# Patient Record
Sex: FEMALE | Race: WHITE | NOT HISPANIC OR LATINO | Employment: FULL TIME | URBAN - METROPOLITAN AREA
[De-identification: names, ages, dates, MRNs, and addresses within clinical notes are randomized per-mention and may not be internally consistent; named-entity substitution may affect disease eponyms.]

---

## 2017-01-28 ENCOUNTER — HOSPITAL ENCOUNTER (EMERGENCY)
Facility: MEDICAL CENTER | Age: 27
End: 2017-01-28
Attending: EMERGENCY MEDICINE
Payer: COMMERCIAL

## 2017-01-28 ENCOUNTER — APPOINTMENT (OUTPATIENT)
Dept: RADIOLOGY | Facility: MEDICAL CENTER | Age: 27
End: 2017-01-28
Attending: EMERGENCY MEDICINE
Payer: COMMERCIAL

## 2017-01-28 VITALS
HEIGHT: 58 IN | SYSTOLIC BLOOD PRESSURE: 112 MMHG | BODY MASS INDEX: 29.99 KG/M2 | WEIGHT: 142.86 LBS | TEMPERATURE: 97.4 F | OXYGEN SATURATION: 95 % | RESPIRATION RATE: 18 BRPM | DIASTOLIC BLOOD PRESSURE: 60 MMHG | HEART RATE: 70 BPM

## 2017-01-28 DIAGNOSIS — M25.551 ACUTE HIP PAIN, RIGHT: ICD-10-CM

## 2017-01-28 DIAGNOSIS — R45.2 UNHAPPINESS: ICD-10-CM

## 2017-01-28 LAB
ALBUMIN SERPL BCP-MCNC: 4.3 G/DL (ref 3.2–4.9)
ALBUMIN/GLOB SERPL: 2.2 G/DL
ALP SERPL-CCNC: 49 U/L (ref 30–99)
ALT SERPL-CCNC: 27 U/L (ref 2–50)
ANION GAP SERPL CALC-SCNC: 4 MMOL/L (ref 0–11.9)
AST SERPL-CCNC: 19 U/L (ref 12–45)
BASOPHILS # BLD AUTO: 0.5 % (ref 0–1.8)
BASOPHILS # BLD: 0.04 K/UL (ref 0–0.12)
BILIRUB SERPL-MCNC: 0.4 MG/DL (ref 0.1–1.5)
BUN SERPL-MCNC: 11 MG/DL (ref 8–22)
CALCIUM SERPL-MCNC: 8.6 MG/DL (ref 8.5–10.5)
CHLORIDE SERPL-SCNC: 108 MMOL/L (ref 96–112)
CO2 SERPL-SCNC: 24 MMOL/L (ref 20–33)
CREAT SERPL-MCNC: 0.66 MG/DL (ref 0.5–1.4)
CRP SERPL HS-MCNC: 0.3 MG/L (ref 0–7.5)
EOSINOPHIL # BLD AUTO: 0.25 K/UL (ref 0–0.51)
EOSINOPHIL NFR BLD: 3.2 % (ref 0–6.9)
ERYTHROCYTE [DISTWIDTH] IN BLOOD BY AUTOMATED COUNT: 39.7 FL (ref 35.9–50)
ERYTHROCYTE [SEDIMENTATION RATE] IN BLOOD BY WESTERGREN METHOD: 6 MM/HOUR (ref 0–20)
GFR SERPL CREATININE-BSD FRML MDRD: >60 ML/MIN/1.73 M 2
GLOBULIN SER CALC-MCNC: 2 G/DL (ref 1.9–3.5)
GLUCOSE SERPL-MCNC: 92 MG/DL (ref 65–99)
HCG SERPL QL: NEGATIVE
HCT VFR BLD AUTO: 43.1 % (ref 37–47)
HGB BLD-MCNC: 14.7 G/DL (ref 12–16)
IMM GRANULOCYTES # BLD AUTO: 0.02 K/UL (ref 0–0.11)
IMM GRANULOCYTES NFR BLD AUTO: 0.3 % (ref 0–0.9)
LYMPHOCYTES # BLD AUTO: 2.17 K/UL (ref 1–4.8)
LYMPHOCYTES NFR BLD: 28.1 % (ref 22–41)
MCH RBC QN AUTO: 30.8 PG (ref 27–33)
MCHC RBC AUTO-ENTMCNC: 34.1 G/DL (ref 33.6–35)
MCV RBC AUTO: 90.4 FL (ref 81.4–97.8)
MONOCYTES # BLD AUTO: 0.46 K/UL (ref 0–0.85)
MONOCYTES NFR BLD AUTO: 6 % (ref 0–13.4)
NEUTROPHILS # BLD AUTO: 4.79 K/UL (ref 2–7.15)
NEUTROPHILS NFR BLD: 61.9 % (ref 44–72)
NRBC # BLD AUTO: 0 K/UL
NRBC BLD AUTO-RTO: 0 /100 WBC
PLATELET # BLD AUTO: 185 K/UL (ref 164–446)
PMV BLD AUTO: 9.7 FL (ref 9–12.9)
POTASSIUM SERPL-SCNC: 3.9 MMOL/L (ref 3.6–5.5)
PROT SERPL-MCNC: 6.3 G/DL (ref 6–8.2)
RBC # BLD AUTO: 4.77 M/UL (ref 4.2–5.4)
SODIUM SERPL-SCNC: 136 MMOL/L (ref 135–145)
WBC # BLD AUTO: 7.7 K/UL (ref 4.8–10.8)

## 2017-01-28 PROCEDURE — 700102 HCHG RX REV CODE 250 W/ 637 OVERRIDE(OP): Performed by: EMERGENCY MEDICINE

## 2017-01-28 PROCEDURE — A9270 NON-COVERED ITEM OR SERVICE: HCPCS | Performed by: EMERGENCY MEDICINE

## 2017-01-28 PROCEDURE — 99284 EMERGENCY DEPT VISIT MOD MDM: CPT

## 2017-01-28 PROCEDURE — 73700 CT LOWER EXTREMITY W/O DYE: CPT | Mod: RT

## 2017-01-28 PROCEDURE — 80053 COMPREHEN METABOLIC PANEL: CPT

## 2017-01-28 PROCEDURE — 85652 RBC SED RATE AUTOMATED: CPT

## 2017-01-28 PROCEDURE — 86141 C-REACTIVE PROTEIN HS: CPT

## 2017-01-28 PROCEDURE — 85025 COMPLETE CBC W/AUTO DIFF WBC: CPT

## 2017-01-28 PROCEDURE — 84703 CHORIONIC GONADOTROPIN ASSAY: CPT

## 2017-01-28 RX ORDER — HYDROCODONE BITARTRATE AND ACETAMINOPHEN 10; 325 MG/1; MG/1
1 TABLET ORAL ONCE
Status: COMPLETED | OUTPATIENT
Start: 2017-01-28 | End: 2017-01-28

## 2017-01-28 RX ORDER — HYDROCODONE BITARTRATE AND ACETAMINOPHEN 5; 325 MG/1; MG/1
1-2 TABLET ORAL EVERY 4 HOURS PRN
Qty: 14 TAB | Refills: 0 | Status: SHIPPED | OUTPATIENT
Start: 2017-01-28 | End: 2018-05-30

## 2017-01-28 RX ADMIN — HYDROCODONE BITARTRATE AND ACETAMINOPHEN 1 TABLET: 10; 325 TABLET ORAL at 20:09

## 2017-01-28 ASSESSMENT — PAIN SCALES - GENERAL: PAINLEVEL_OUTOF10: 8

## 2017-01-28 NOTE — ED AVS SNAPSHOT
Motilo Access Code: [email or web address]  Expires: 2/26/2017 10:33 AM    Motilo  A secure, online tool to manage your health information     [facility]’s Motilo® is a secure, online tool that connects you to your personalized health information from the privacy of your home -- day or night - making it very easy for you to manage your healthcare. Once the activation process is completed, you can even access your medical information using the Motilo niles, which is available for free in the Apple Niles store or Google Play store.     Motilo provides the following levels of access (as shown below):   My Chart Features   [facility] Primary Care Doctor [facility]  Specialists [facility]  Urgent  Care Non-[facility]  Primary Care  Doctor   Email your healthcare team securely and privately 24/7 X X X X   Manage appointments: schedule your next appointment; view details of past/upcoming appointments X      Request prescription refills. X      View recent personal medical records, including lab and immunizations X X X X   View health record, including health history, allergies, medications X X X X   Read reports about your outpatient visits, procedures, consult and ER notes X X X X   See your discharge summary, which is a recap of your hospital and/or ER visit that includes your diagnosis, lab results, and care plan. X X       How to register for Motilo:  1. Go to  [email or web address].  2. Click on the Sign Up Now box, which takes you to the New Member Sign Up page. You will need to provide the following information:  a. Enter your Motilo Access Code exactly as it appears at the top of this page. (You will not need to use this code after you’ve completed the sign-up process. If you do not sign up before the expiration date, you must request a new code.)   b. Enter your date of birth.   c. Enter your home email address.   d. Click Submit, and follow the next screen’s instructions.  3. Create a Motilo ID. This will be your Motilo  login ID and cannot be changed, so think of one that is secure and easy to remember.  4. Create a [employer] password. You can change your password at any time.  5. Enter your Password Reset Question and Answer. This can be used at a later time if you forget your password.   6. Enter your e-mail address. This allows you to receive e-mail notifications when new information is available in [employer]ix.  7. Click Sign Up. You can now view your health information.    For assistance activating your [employer] account, call [phone]

## 2017-01-28 NOTE — ED AVS SNAPSHOT
After Visit Summary                                                                                                                Veronica Kirby   MRN: [id number]    Department:  [facility], Emergency Dept   Date of Visit:  1/28/2017            [facility], Emergency Dept    [address]    [place] [address]    Phone:  [phone]      You were seen by     Clayton Negron M.D.      Your Diagnosis Was     Acute hip pain, right     M25.551       These are the medications you received during your hospitalization from 01/28/2017 1803 to 01/28/2017 2245     Date/Time Order Dose Route Action    01/28/2017 2009 hydrocodone/acetaminophen (NORCO)  MG per tablet 1 Tab 1 Tab Oral Given      Follow-up Information     1. Follow up with [facility].    Contact information    [address] [place] [address] [phone]      Medication Information     Review all of your home medications and newly ordered medications with your primary doctor and/or pharmacist as soon as possible. Follow medication instructions as directed by your doctor and/or pharmacist.     Please keep your complete medication list with you and share with your physician. Update the information when medications are discontinued, doses are changed, or new medications (including over-the-counter products) are added; and carry medication information at all times in the event of emergency situations.               Medication List      START taking these medications        Instructions    hydrocodone-acetaminophen 5-325 MG Tabs per tablet   Commonly known as:  NORCO    Take 1-2 Tabs by mouth every four hours as needed.   Dose:  1-2 Tab         ASK your doctor about these medications        Instructions    IRON PO    Take 2 Tabs by mouth every day.   Dose:  2 Tab       naproxen 500 MG Tabs   Commonly known as:  NAPROSYN    Take 500 mg by mouth as needed. Indications: Mild to Moderate Pain   Dose:  500 mg       therapeutic multivitamin-minerals Tabs    Take 1 Tab by mouth every day.   Dose:  1 Tab               Procedures and tests performed during your visit     BETA-HCG QUALITATIVE SERUM    CBC WITH DIFFERENTIAL    CMP    CRP HIGH SENSITIVE (CARDIAC)    CT-HIP W/O PLUS RECONS RIGHT    ESTIMATED GFR    WESTERGREN SED RATE        Discharge Instructions       Contusion  A contusion is a deep bruise. Contusions are the result of an injury that caused bleeding under the skin. The contusion may turn blue, purple, or yellow. Minor injuries will give you a painless contusion, but more severe contusions may stay painful and swollen for a few weeks.   CAUSES   A contusion is usually caused by a blow, trauma, or direct force to an area of the body.  SYMPTOMS   · Swelling and redness of the injured area.  · Bruising of the injured area.  · Tenderness and soreness of the injured area.  · Pain.  DIAGNOSIS   The diagnosis can be made by taking a history and physical exam. An X-ray, CT scan, or MRI may be needed to determine if there were any associated injuries, such as fractures.  TREATMENT   Specific treatment will depend on what area of the body was injured. In general, the best treatment for a contusion is resting, icing, elevating, and applying cold compresses to the injured area. Over-the-counter medicines may also be recommended for pain control. Ask your caregiver what the best treatment is for your contusion.  HOME CARE INSTRUCTIONS   · Put ice on the injured area.  ¨ Put ice in a plastic bag.  ¨ Place a towel between your skin and the bag.  ¨ Leave the ice on for 15-20 minutes, 3-4 times a day, or as directed by your health care provider.  · Only take over-the-counter or prescription medicines for pain, discomfort, or fever as directed by your caregiver. Your caregiver may recommend avoiding anti-inflammatory medicines (aspirin, ibuprofen, and naproxen) for 48 hours because these medicines may increase bruising.  · Rest  the injured area.  · If possible, elevate the injured area to reduce swelling.  SEEK IMMEDIATE MEDICAL CARE IF:   · You have increased bruising or swelling.  · You have pain that is getting worse.  · Your swelling or pain is not relieved with medicines.  MAKE SURE YOU:   · Understand these instructions.  · Will watch your condition.  · Will get help right away if you are not doing well or get worse.     This information is not intended to replace advice given to you by your health care provider. Make sure you discuss any questions you have with your health care provider.     Document Released: 09/27/2006 Document Revised: 12/23/2014 Document Reviewed: 10/22/2012  [employer]now [employer] Interactive Patient Education ©2016 Elsevier [employer].            Patient Information     Patient Information    Following emergency treatment: all patient requiring follow-up care must return either to a private physician or a clinic if your condition worsens before you are able to obtain further medical attention, please return to the emergency room.     Billing Information    At [facility], we work to make the billing process streamlined for our patients.  Our Representatives are here to answer any questions you may have regarding your hospital bill.  If you have insurance coverage and have supplied your insurance information to us, we will submit a claim to your insurer on your behalf.  Should you have any questions regarding your bill, we can be reached online or by phone as follows:  Online: You are able pay your bills online or live chat with our representatives about any billing questions you may have. We are here to help Monday - Friday from 8:00am to 7:30pm and 9:00am - 12:00pm on Saturdays.  Please visit [email or web address]  for more information.   Phone:  [phone] or [phone]    Please note that your emergency physician, surgeon, pathologist, radiologist, anesthesiologist, and other specialists  are not employed by [facility] and will therefore bill separately for their services.  Please contact them directly for any questions concerning their bills at the numbers below:     Emergency Physician Services:  [phone]  [facility]:  [phone]  [facility]:  [phone]  [facility]:  [phone]    1. Your final bill may vary from the amount quoted upon discharge if all procedures are not complete at that time, or if your doctor has additional procedures of which we are not aware. You will receive an additional bill if you return to the Emergency Department at [facility] for suture removal regardless of the facility of which the sutures were placed.     2. Please arrange for settlement of this account at the emergency registration.    3. All self-pay accounts are due in full at the time of treatment.  If you are unable to meet this obligation then payment is expected within 4-5 days.     4. If you have had radiology studies (CT, X-ray, Ultrasound, MRI), you have received a preliminary result during your emergency department visit. Please contact the radiology department [phone] to receive a copy of your final result. Please discuss the Final result with your primary physician or with the follow up physician provided.     Crisis Hotline:  [place] Crisis Hotline:  2-153-VRVSNIQ or 1-654.769.8439  [place] Crisis Hotline:    1-115.103.2174 or 984-917-7177         ED Discharge Follow Up Questions    1. In order to provide you with very good care, we would like to follow up with a phone call in the next few days.  May we have your permission to contact you?     YES /  NO    2. What is the best phone number to call you? (       )_____-__________    3. What is the best time to call you?      Morning  /  Afternoon  /  Evening                   Patient Signature:  ____________________________________________________________    Date:   ____________________________________________________________

## 2017-01-28 NOTE — ED AVS SNAPSHOT
1/28/2017          Veronica Kirby  [address]  [address]  [place] [address]    Dear Veronica:    [facility] wants to ensure your discharge home is safe and you or your loved ones have had all your questions answered regarding your care after you leave the hospital.    You may receive a telephone call within two days of your discharge.  This call is to make certain you understand your discharge instructions as well as ensure we provided you with the best care possible during your stay with us.     The call will only last approximately 3-5 minutes and will be done by a nurse.    Once again, we want to ensure your discharge home is safe and that you have a clear understanding of any next steps in your care.  If you have any questions or concerns, please do not hesitate to contact us, we are here for you.  Thank you for choosing [facility] for your healthcare needs.    Sincerely,    Clayton Castillo    [facility]

## 2017-01-29 NOTE — ED NOTES
Pt c/o R hip pain s/p slipping on ice several times on 3 different occasions. Pt was able to ambulate from lobby to room, gait steady. A/o x4, speaking in full sentences.

## 2017-01-29 NOTE — ED PROVIDER NOTES
"ED Provider Note    Scribed for Clayton Negron M.D. by Angelica aWde. 1/28/2017, 7:51 PM.    Primary care provider: Pcp Pt States None  Means of arrival: Walk-In  History obtained from: Patient  History limited by: None    CHIEF COMPLAINT  Chief Complaint   Patient presents with   • Hip Injury     right hip.    • T-5000 GLF     fell on ice 3 seperate occasions      HPI  Veronica Kirby is a 27 y.o. female who presents to the Emergency Department with persistent worsening non-radiating right-sided hip pain without associated symptoms onset four days ago.  Though the patient denies trauma to her hip, she \"slipped on the ice\" three times within the last week.  Per patient, she \"caught herself\" each time and did not fall or land on her hip.  Four days ago the patient woke up to moderate pain that quickly increased in severity.  The patient has since suffered from constant pain, exacerbated with movement.  She reports finding no relief from over the counter pain medications such as Advil and Aleve.  The patient has also seen little to no relief from ice and heat application.  The patient denies her pain radiating to her lower back.  She does not have associated numbness, tingling, or weakness.  The patient denies suffering from hip pain in the past but notes recurrent knee pain and swelling, for which she does not meet with a specialist.  The patient denies history of gout.  She denies chance of pregnancy.  The patient smokes but denies recreational drug use.  She denies chance of pregnancy.  The patient denies additional symptoms or further pertinent medical history.     REVIEW OF SYSTEMS  See HPI,  Negative for lower back pain, numbness, tingling, weakness   Remainder of ROS negative.     PAST MEDICAL HISTORY   The patient denies pertinent past medical history.     SURGICAL HISTORY  patient denies any surgical history    SOCIAL HISTORY  Social History   Substance Use Topics   • Smoking status: Current Every Day Smoker " "-- 0.50 packs/day   • Smokeless tobacco: None   • Alcohol Use: Yes      Comment: social      History   Drug Use No     FAMILY HISTORY  History reviewed. No pertinent family history.    CURRENT MEDICATIONS  Reviewed.  See Encounter Summary.     ALLERGIES  Allergies   Allergen Reactions   • Pcn [Penicillins] Anaphylaxis   • Sulfa Drugs Anaphylaxis   • Codeine Unspecified     Pt states \"I was a childhood allergie\".     PHYSICAL EXAM  VITAL SIGNS: /68 mmHg  Pulse 102  Temp(Src) 36 °C (96.8 °F)  Resp 18  Ht 1.473 m (4' 10\")  Wt 64.8 kg (142 lb 13.7 oz)  BMI 29.87 kg/m2  SpO2 100%  Constitutional: Awake, alert in no apparent distress.  HENT: Normocephalic, Bilateral external ears normal. Nose normal.   Eyes: Conjunctiva normal, non-icteric, EOMI.    Thorax & Lungs: Easy unlabored respirations, clear to auscultation bilaterally.  Cardiovascular:  Mild tachycardia, brisk capillary refill  Abdomen: Nondistended , soft, nontender  Musculoskeletal:  Tenderness over the lateral rght hip decreased ROm secondary to pain, however the patient is able to range approximately 30° in every direction. There is no obvious sign of trauma over the hip.  Skin: Visualized skin is  Dry, No erythema, No rash.   Extremities:   No cyanosis, clubbing or edema  Neurologic: Alert, Grossly non-focal.   Psychiatric: Rather hostile affect    DIAGNOSTIC STUDIES / PROCEDURES    RADIOLOGY  CT-HIP W/O PLUS RECONS RIGHT   Final Result      1.  No RIGHT hip or pelvic fracture.   2.  Minimal degenerative change of both hips.   3.  Small amount of pelvic fluid, nonspecific.        The radiologist's interpretation of all radiological studies have been reviewed by me.    COURSE & MEDICAL DECISION MAKING  Nursing notes and vital signs were reviewed. Pertinent Labs & Imaging studies reviewed. (See chart for details)    7:51 PM - Patient seen and examined at bedside. Patient will be treated with 5 mg of Norco via tablet. Ordered CT-Hip, CBC, CMP, " Westergren SED Rate, CRP High Sensitive, and Beta-HCG Qualitative Serum to evaluate her symptoms.     10:16 PM- At this time the patient's lab results are available.  The only result still pending is her CT-Hip.  The patient will be rechecked once radiology results are complete, if not sooner.     10:34 PM- The patient's CT-Hip results are now available, see above.  She does not present with a hip or pelvic fracture but does have degenerative changes bilaterally.  Patient and her family will be updated shortly.     10:44 PM - Re-examined; The patient is resting with spouse at bedside. I discussed her above findings and plans for discharge with a prescription for Norco. Patient understands but voices some frustration with the inability to obtain a more significant diagnosis.  The patient was informed that it is likely she incurred a bone bruise that will resolve in time.  The patient was encouraged to continue applying heat and ice.  Rest was also suggested.  She will follow up with her primary care physician and was instructed to return to the ED if her symptoms worsen. The patient will receive further information  to take home.  All questions and concerns were addressed.  Patient understands and agrees.    I reviewed prescription monitoring program for patient's narcotic use before prescribing a scheduled drug.The patient will not drink alcohol nor drive with prescribed medications. The patient was informed of their systolic blood pressure exceeding 120 and I have asked them to follow up with their primary care physician to discuss further monitoring and possible treatment.     Decision Making:  This is a 27 y.o. year old female who presents with reportedly severe right hip pain aftersignificant trauma. She denies falling on the right hip. She states that she is unable to bear weight secondary to pain. For this reason a full workup was conducted to include a CT and basic blood work. There is no evidence of a septic  joint either by laboratory evaluation or clinical examination. CT is also unremarkable, there is no occult hip fracture, there is no significant joint effusion. I suspect this is a hip contusion or arthritis.  I recommend she follow-up with primary care physician for additional management of her hip pain. She is discharged with crutches and a small amount of pain medications.I have looked the patient up in the prescription monitoring database prior to prescribing the scheduled medication. I have also counseled the patient not to drive or operate heavy machinery while taking any medication that may cause sedation.  The patient's blood pressure is elevated today. >120/80. I have referred them to primary care for follow up.       DISPOSITION:  Patient will be discharged home in good condition.    Discharge Medications:  New Prescriptions    HYDROCODONE-ACETAMINOPHEN (NORCO) 5-325 MG TAB PER TABLET    Take 1-2 Tabs by mouth every four hours as needed.     The patient was discharged home (see d/c instructions) and told to return immediately for any signs or symptoms listed, or any worsening at all.  The patient verbally agreed to the discharge precautions and follow-up plan which is documented in EPIC.    FINAL IMPRESSION  1. Acute hip pain, right        Angelica ALTAMIRANO (Scribe), am scribing for, and in the presence of, Clayton Negron M.D..    Electronically signed by: Angelica Wade ([employer]), 1/28/2017    Clayton ALTAMIRANO M.D. personally performed the services described in this documentation, as scribed by Angelica Wade in my presence, and it is both accurate and complete.    The note accurately reflects work and decisions made by me.  Clayton Negron  1/29/2017  12:57 AM

## 2017-01-29 NOTE — ED NOTES
.  Chief Complaint   Patient presents with   • Hip Injury     right hip.    • T-5000 GLF     fell on ice 3 seperate occasions      Ambulated to triage with above c/c. Pt has tried ice, heat and tens unit without relief.

## 2017-03-21 ENCOUNTER — OCCUPATIONAL MEDICINE (OUTPATIENT)
Dept: URGENT CARE | Facility: PHYSICIAN GROUP | Age: 27
End: 2017-03-21
Payer: MEDICARE

## 2017-03-21 ENCOUNTER — APPOINTMENT (OUTPATIENT)
Dept: RADIOLOGY | Facility: IMAGING CENTER | Age: 27
End: 2017-03-21
Attending: NURSE PRACTITIONER
Payer: MEDICARE

## 2017-03-21 VITALS
HEART RATE: 82 BPM | DIASTOLIC BLOOD PRESSURE: 82 MMHG | TEMPERATURE: 98.4 F | BODY MASS INDEX: 28.97 KG/M2 | WEIGHT: 138 LBS | RESPIRATION RATE: 14 BRPM | OXYGEN SATURATION: 99 % | HEIGHT: 58 IN | SYSTOLIC BLOOD PRESSURE: 106 MMHG

## 2017-03-21 DIAGNOSIS — S89.91XA RIGHT KNEE INJURY, INITIAL ENCOUNTER: ICD-10-CM

## 2017-03-21 DIAGNOSIS — S80.01XA CONTUSION OF RIGHT KNEE, INITIAL ENCOUNTER: ICD-10-CM

## 2017-03-21 DIAGNOSIS — Z02.1 PRE-EMPLOYMENT DRUG SCREENING: ICD-10-CM
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
## 2017-03-21 LAB
AMP AMPHETAMINE: NORMAL
BREATH ALCOHOL COMMENT: NORMAL
COC COCAINE: NORMAL
INT CON NEG: NEGATIVE
INT CON POS: POSITIVE
MET METHAMPHETAMINES: NORMAL
OPI OPIATES: NORMAL
PCP PHENCYCLIDINE: NORMAL
POC BREATHALIZER: 0 PERCENT (ref 0–0.01)
POC DRUG COMMENT 753798-OCCUPATIONAL HEALTH: NORMAL
THC: NORMAL

## 2017-03-21 PROCEDURE — 82075 ASSAY OF BREATH ETHANOL: CPT | Mod: 29 | Performed by: NURSE PRACTITIONER

## 2017-03-21 PROCEDURE — 99204 OFFICE O/P NEW MOD 45 MIN: CPT | Mod: 29 | Performed by: NURSE PRACTITIONER

## 2017-03-21 PROCEDURE — L1830 KO IMMOB CANVAS LONG PRE OTS: HCPCS | Mod: 29 | Performed by: NURSE PRACTITIONER

## 2017-03-21 PROCEDURE — 73562 X-RAY EXAM OF KNEE 3: CPT | Mod: 26,RT,29 | Performed by: NURSE PRACTITIONER

## 2017-03-21 PROCEDURE — 80300 POCT 6 PANEL URINE DRUG SCREEN - INSTANT: CPT | Mod: 29 | Performed by: NURSE PRACTITIONER

## 2017-03-21 RX ORDER — KETOROLAC TROMETHAMINE 30 MG/ML
60 INJECTION, SOLUTION INTRAMUSCULAR; INTRAVENOUS ONCE
Status: COMPLETED | OUTPATIENT
Start: 2017-03-21 | End: 2017-03-21

## 2017-03-21 RX ADMIN — KETOROLAC TROMETHAMINE 60 MG: 30 INJECTION, SOLUTION INTRAMUSCULAR; INTRAVENOUS at 11:18

## 2017-03-21 ASSESSMENT — ENCOUNTER SYMPTOMS
NUMBNESS: 0
JOINT SWELLING: 1
ARTHRALGIAS: 1
FALLS: 1
BACK PAIN: 0
NECK PAIN: 0

## 2017-03-21 NOTE — MR AVS SNAPSHOT
"        Veronica Kirby   3/21/2017 10:10 AM   Occupational Medicine   MRN: [id number]    Department:  [facility]   Dept Phone:  [phone]    Description:  Female : 1990   Provider:  LEONARD Baer           Reason for Visit     Knee Injury R. knee Pt. 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       Allergies as of 3/21/2017     Allergen Noted Reactions    Pcn [Penicillins] 2011   Anaphylaxis    Sulfa Drugs 2008   Anaphylaxis    Codeine 2008   Unspecified    Pt states \"I was a childhood allergie\".      You were diagnosed with     Right knee injury, initial encounter   [334502]       Contusion of right knee, initial encounter   [120079]       Work related injury   [898153]       Pre-employment drug screening   [597633]         Vital Signs     Blood Pressure Pulse Temperature Respirations Height Weight    106/82 mmHg 82 36.9 °C (98.4 °F) 14 1.473 m (4' 9.99\") 62.596 kg (138 lb)    Body Mass Index Oxygen Saturation Smoking Status             28.85 kg/m2 99% Current Every Day Smoker         Basic Information     Date Of Birth Sex Race Ethnicity Preferred Language    1990 Female White Non- English      Your appointments     Mar 24, 2017  1:00 PM   Workers Compensation with [facility])    [address] [place] [address]. [address]  [facility] [address]   [phone]              Health Maintenance     Patient has no pending health maintenance at this time      Results     POCT Breath Alcohol Test      Component Value Standard Range & Units    POC Breathalizer 0.000 0.00 - 0.01 Percent    Breath Alcohol Comment                  POCT 6 Panel Urine Drug Screen      Component    AMPHETAMINE    Neg    POC THC    Inconclusive    Comment:     IN clinic testing resulted inconclusive for THC    COCAINE    Neg    OPIATES    Neg    PHENCYCLIDINE    Neg    METHAMPHETAMINES    Neg    POC Urine Drug Screen " Comment    N/A    Internal Control Positive    Positive    Internal Control Negative    Negative                        Current Immunizations     Tdap Vaccine 12/21/2016  8:42 PM    Tuberculin Skin Test 5/6/2014 11:47 AM, 4/29/2014 11:30 AM      Below and/or attached are the medications your provider expects you to take. Review all of your home medications and newly ordered medications with your provider and/or pharmacist. Follow medication instructions as directed by your provider and/or pharmacist. Please keep your medication list with you and share with your provider. Update the information when medications are discontinued, doses are changed, or new medications (including over-the-counter products) are added; and carry medication information at all times in the event of emergency situations     Allergies:  PCN - Anaphylaxis     SULFA DRUGS - Anaphylaxis     CODEINE - Unspecified               Medications  Valid as of: March 21, 2017 - 11:32 AM    Generic Name Brand Name Tablet Size Instructions for use    Hydrocodone-Acetaminophen (Tab) NORCO 5-325 MG Take 1-2 Tabs by mouth every four hours as needed.        Iron   Take 2 Tabs by mouth every day.        Melatonin   Take  by mouth.        Multiple Vitamins-Minerals (Tab) THERAGRAN-M  Take 1 Tab by mouth every day.        Naproxen (Tab) NAPROSYN 500 MG Take 500 mg by mouth as needed. Indications: Mild to Moderate Pain        .                 Medicines prescribed today were sent to:     [id number] - [place] - [address] [place] [address] AT [facility] [address] [address]    Phone: [phone] Fax: [phone]    Open 24 Hours?: No      Medication refill instructions:       If your prescription bottle indicates you have medication refills left, it is not necessary to call your provider’s office. Please contact your pharmacy and they will refill your medication.    If your prescription bottle indicates you do not have any refills left, you  may request refills at any time through one of the following ways: The online Higher One system (except Urgent Care), by calling your provider’s office, or by asking your pharmacy to contact your provider’s office with a refill request. Medication refills are processed only during regular business hours and may not be available until the next business day. Your provider may request additional information or to have a follow-up visit with you prior to refilling your medication.   *Please Note: Medication refills are assigned a new Rx number when refilled electronically. Your pharmacy may indicate that no refills were authorized even though a new prescription for the same medication is available at the pharmacy. Please request the medicine by name with the pharmacy before contacting your provider for a refill.        Your To Do List     Future Labs/Procedures Complete By Expires    DX-KNEE 3 VIEWS RIGHT  As directed 3/21/2018         Higher One Access Code: [email or web address]  Expires: 3/27/2017 11:13 AM    Higher One  A secure, online tool to manage your health information     [employer]’s Higher One® is a secure, online tool that connects you to your personalized health information from the privacy of your home -- day or night - making it very easy for you to manage your healthcare. Once the activation process is completed, you can even access your medical information using the Higher One niles, which is available for free in the Apple Niles store or Google Play store.     Higher One provides the following levels of access (as shown below):   My Chart Features   [facility] Doctor [facility]  Specialists [facility]  Urgent  Care Non-[facility]  Primary Care  Doctor   Email your healthcare team securely and privately 24/7 X X X    Manage appointments: schedule your next appointment; view details of past/upcoming appointments X      Request prescription refills. X      View recent personal medical records, including lab and immunizations X X  X X   View health record, including health history, allergies, medications X X X X   Read reports about your outpatient visits, procedures, consult and ER notes X X X X   See your discharge summary, which is a recap of your hospital and/or ER visit that includes your diagnosis, lab results, and care plan. X X       How to register for [employer]:  1. Go to  [email or web address].  2. Click on the Sign Up Now box, which takes you to the New Member Sign Up page. You will need to provide the following information:  a. Enter your [employer] Access Code exactly as it appears at the top of this page. (You will not need to use this code after you’ve completed the sign-up process. If you do not sign up before the expiration date, you must request a new code.)   b. Enter your date of birth.   c. Enter your home email address.   d. Click Submit, and follow the next screen’s instructions.  3. Create a [employer] ID. This will be your [employer] login ID and cannot be changed, so think of one that is secure and easy to remember.  4. Create a [employer] password. You can change your password at any time.  5. Enter your Password Reset Question and Answer. This can be used at a later time if you forget your password.   6. Enter your e-mail address. This allows you to receive e-mail notifications when new information is available in [employer].  7. Click Sign Up. You can now view your health information.    For assistance activating your [employer] account, call [phone]        Quit Tobacco Information     Do you want to quit using tobacco?    Quitting tobacco decreases risks of cancer, heart and lung disease, increases life expectancy, improves sense of taste and smell, and increases spending money, among other benefits.    If you are thinking about quitting, we can help.  • [facility] Quit Tobacco Program: [phone]  o Program occurs weekly for four weeks and includes pharmacist consultation on products to support quitting smoking or  chewing tobacco. A provider referral is needed for pharmacist consultation.  • Tobacco Users Help Hotline: 8-800QUIT-NOW (603-3252) or https://[place].quitlogix.org/  o Free, confidential telephone and online coaching for [place] residents. Sessions are designed on a schedule that is convenient for you. Eligible clients receive free nicotine replacement therapy.  • Nationally: www.smokefree.gov  o Information and professional assistance to support both immediate and long-term needs as you become, and remain, a non-smoker. Smokefree.gov allows you to choose the help that best fits your needs.

## 2017-03-21 NOTE — PROGRESS NOTES
Subjective:      Veronica Kirby is a 27 y.o. female who presents with Knee Injury      DOI: 3/19/17- She was in the kitchen at work, slipped and fell directly onto her bended knee. She had immediate pain and some swelling. Painful to bear weight when walking. Is wearing a knee brace, has iced her knee and taken Naproxen over the weekend hoping it would improve but it has not.      Knee Injury  This is a new problem. Episode onset: 3/19/17. The problem occurs constantly. The problem has been gradually worsening. Associated symptoms include arthralgias and joint swelling. Pertinent negatives include no neck pain or numbness. Associated symptoms comments: Reports there was a bruise to the front of her knee but it has now resolved. Of note, she reports she fell skiing 14 years ago and has had pain on and off in the right knee ever since. States that she has been seen several times in the ER for this pain, referred to Orthopedics but never went. . The symptoms are aggravated by walking (flexing her knee). She has tried NSAIDs, ice, rest and immobilization for the symptoms. The treatment provided no relief.       Review of Systems   Musculoskeletal: Positive for joint pain, falls, joint swelling and arthralgias. Negative for back pain and neck pain.        Right knee   Neurological: Negative for numbness.   All other systems reviewed and are negative.    PMH:  has no past medical history on file.  MEDS:   Current outpatient prescriptions:   •  MELATONIN PO, Take  by mouth., Disp: , Rfl:   •  therapeutic multivitamin-minerals (THERAGRAN-M) Tab, Take 1 Tab by mouth every day., Disp: , Rfl:   •  IRON PO, Take 2 Tabs by mouth every day., Disp: , Rfl:   •  naproxen (NAPROSYN) 500 MG Tab, Take 500 mg by mouth as needed. Indications: Mild to Moderate Pain, Disp: , Rfl:   •  hydrocodone-acetaminophen (NORCO) 5-325 MG Tab per tablet, Take 1-2 Tabs by mouth every four hours as needed., Disp: 14 Tab, Rfl: 0  ALLERGIES:   Allergies  "  Allergen Reactions   • Pcn [Penicillins] Anaphylaxis   • Sulfa Drugs Anaphylaxis   • Codeine Unspecified     Pt states \"I was a childhood allergie\".     SURGHX: No past surgical history on file.  SOCHX:  reports that she has been smoking.  She does not have any smokeless tobacco history on file. She reports that she drinks alcohol. She reports that she does not use illicit drugs.  FH: In accordance with [employer] Act of 2008, family history is not collected for Occupational Health visits.         Objective:     /82 mmHg  Pulse 82  Temp(Src) 36.9 °C (98.4 °F)  Resp 14  Ht 1.473 m (4' 9.99\")  Wt 62.596 kg (138 lb)  BMI 28.85 kg/m2  SpO2 99%     Physical Exam   Constitutional: She is oriented to person, place, and time. Vital signs are normal. She appears well-developed and well-nourished.   HENT:   Head: Normocephalic.   Eyes: EOM are normal. Pupils are equal, round, and reactive to light.   Neck: Normal range of motion.   Cardiovascular: Normal rate and regular rhythm.    Pulmonary/Chest: Effort normal.   Musculoskeletal:        Right knee: She exhibits decreased range of motion. She exhibits no swelling, no ecchymosis, no deformity, no erythema, no LCL laxity, normal patellar mobility and no MCL laxity. Tenderness found.   On exam, she is severely TTP over all aspects of the knee  Lachman negative  Varus and Valgus stress test negative  No locking or crepitus appreciated  Pain with flexion and weight bearing   Neurological: She is alert and oriented to person, place, and time.   Skin: Skin is warm and dry.   Psychiatric: She has a normal mood and affect. Her speech is normal and behavior is normal. Thought content normal.   Vitals reviewed.         Xray: no fracture or dislocation by my read. Radiology review pending.'  3/21/2017 10:53 AM    HISTORY/REASON FOR EXAM:  Pain/Deformity Following Trauma  Anterior pain    TECHNIQUE/EXAM DESCRIPTION AND NUMBER OF VIEWS:  3 views of the RIGHT knee.    COMPARISON: " None    FINDINGS:  No fracture or dislocation is seen. Alignment is maintained. No joint effusion is identified. Mineralization is maintained.         Impression        No evidence of acute fracture or dislocation.          Assessment/Plan:     1. Right knee injury, initial encounter  - DX-KNEE 3 VIEWS RIGHT; Future  - ketorolac (TORADOL) injection 60 mg; 2 mL by Intramuscular route Once.    2. Contusion of right knee, initial encounter  - DX-KNEE 3 VIEWS RIGHT; Future  - ketorolac (TORADOL) injection 60 mg; 2 mL by Intramuscular route Once.    3. Work related injury    4. Pre-employment drug screening  - POCT Breath Alcohol Test  - POCT 6 Panel Urine Drug Screen      As the provider, it appears the pain in her knee is out of proportion to the mechanism of injury.    Work restrictions  RICE and knee immobilizer  Tylenol and Ibuprofen PRN pain  Follow up in 3 days

## 2017-03-21 NOTE — Clinical Note
"EMPLOYEE’S CLAIM FOR COMPENSATION/ REPORT OF INITIAL TREATMENT  FORM C-4    EMPLOYEE’S CLAIM - PROVIDE ALL INFORMATION REQUESTED   First Name  Veronica Last Name   Birthdate                    1990                Sex  female Claim Number   Home Address  [address]  [address] Age  27 y.o. 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 (if applicable)  walking    How did this injury or occupational disease occur? (Be specific an answer in detail. Use additional sheet if necessary)  Walking with prime rib turned and fell on knee.   If you believe that you have an occupational disease, when did you first have knowledge of the disability and it relationship to your employment?  n/a Witnesses to the Accident  Alex Badillo      Nature of Injury or Occupational Disease  Contusion  Part(s) of Body Injured or Affected  Knee (R), ,     I certify that the above is true and correct to the best of my knowledge and that I have provided this information in order to obtain the benefits of [place]’s " [place]  I hereby authorize any physician, chiropractor, surgeon, practitioner, or other person, any hospital, including [facility] or [facility], any medical service organization, any insurance company, or other institution or organization to release to each other, any medical or other information, including benefits paid or payable, pertinent to this injury or disease, except information relative to diagnosis, treatment and/or counseling for AIDS, psychological conditions, alcohol or controlled substances, for which I must give specific authorization.  A Photostat of this authorization shall be as valid as the original.     Date   Place   Employee’s Signature   THIS REPORT MUST BE COMPLETED AND MAILED WITHIN 3 WORKING DAYS OF TREATMENT   Place  [facility]  Name of Facility  [place]   Date  3/21/2017 Diagnosis  (S89.91XA) Right knee injury, initial encounter  (S80.01XA) Contusion of right knee, initial encounter  (Y99.0) Work related injury  (Z02.1) Pre-employment drug screening Is there evidence the injured employee was under the influence of alcohol and/or another controlled substance at the time of accident?   Hour  10:09 AM Description of Injury or Disease  Diagnoses of Right knee injury, initial encounter, Contusion of right knee, initial encounter, Work related injury, and Pre-employment drug screening were pertinent to this visit. No   Treatment  Work restrictions  RICE and knee immobilizer  Tylenol and Ibuprofen PRN pain  Follow up in 3 days  Have you advised the patient to remain off work five days or more? No   X-Ray Findings  Negative  Comments:No evidence of acute fracture or dislocation.   If Yes   From Date  To Date      From information given by the employee, together with medical evidence, can you directly connect this injury or occupational disease as job  "incurred?  Yes If No Full Duty  No Modified Duty  Yes   Is additional medical care by a physician indicated?  Yes If Modified Duty, Specify any Limitations / Restrictions  No kneeling or squatting     Do you know of any previous injury or disease contributing to this condition or occupational disease?                            Yes  Comments:She reports she fell skiing 14 years ago and has had pain on and off in the right knee ever since. She also states that she has been seen several times in the ER for this pain, referred to Orthopedics but never went   Date  3/21/2017 Print Doctor’s Name LEONARD Baer I certify the employer’s copy of  this form was mailed on:   Address  [address] [place] [address]. [address] Insurer’s Use Only     [facility] Zip  [address]    Provider’s Tax ID Number  [id number]  Telephone  Dept: [phone]        e-SignWHJENA MURPHY   e-Signature: Dr. Rojelio Whiting, Medical Director Degree  APRN        ORIGINAL-TREATING PHYSICIAN OR CHIROPRACTOR    PAGE 2-INSURER/TPA    PAGE 3-EMPLOYER    PAGE 4-EMPLOYEE             Form C-4 (rev10/07)              BRIEF DESCRIPTION OF RIGHTS AND BENEFITS  (Pursuant to [place]RS 616C.050)    Notice of Injury or Occupational Disease (Incident Report Form C-1): If an injury or occupational disease (OD) arises out of and in the  course of employment, you must provide written notice to your employer as soon as practicable, but no later than 7 days after the accident or  OD. Your employer shall maintain a sufficient supply of the required forms.    Claim for Compensation (Form C-4): If medical treatment is sought, the form C-4 is available at the place of initial treatment. A completed  \"Claim for Compensation\" (Form C-4) must be filed within 90 days after an accident or OD. The treating physician or chiropractor must,  within 3 working days after treatment, complete and mail to the employer, the employer's insurer and third-party " , the Claim for  Compensation.    Medical Treatment: If you require medical treatment for your on-the-job injury or OD, you may be required to select a physician or  chiropractor from a list provided by your workers’ compensation insurer, if it has contracted with an Organization for Managed Care (MCO) or  Preferred Provider Organization (PPO) or providers of health care. If your employer has not entered into a contract with an MCO or PPO, you  may select a physician or chiropractor from the Panel of Physicians and Chiropractors. Any medical costs related to your industrial injury or  OD will be paid by your insurer.    Temporary Total Disability (TTD): If your doctor has certified that you are unable to work for a period of at least 5 consecutive days, or 5  cumulative days in a 20-day period, or places restrictions on you that your employer does not accommodate, you may be entitled to TTD  compensation.    Temporary Partial Disability (TPD): If the wage you receive upon reemployment is less than the compensation for TTD to which you are  entitled, the insurer may be required to pay you TPD compensation to make up the difference. TPD can only be paid for a maximum of 24  months.    Permanent Partial Disability (PPD): When your medical condition is stable and there is an indication of a PPD as a result of your injury or  OD, within 30 days, your insurer must arrange for an evaluation by a rating physician or chiropractor to determine the degree of your PPD. The  amount of your PPD award depends on the date of injury, the results of the PPD evaluation and your age and wage.    Permanent Total Disability (PTD): If you are medically certified by a treating physician or chiropractor as permanently and totally disabled  and have been granted a PTD status by your insurer, you are entitled to receive monthly benefits not to exceed 66 2/3% of your average  monthly wage. The amount of your PTD payments is  subject to reduction if you previously received a PPD award.    Vocational Rehabilitation Services: You may be eligible for vocational rehabilitation services if you are unable to return to the job due to a  permanent physical impairment or permanent restrictions as a result of your injury or occupational disease.    Transportation and Per Marni Reimbursement: You may be eligible for travel expenses and per marni associated with medical treatment.    Reopening: You may be able to reopen your claim if your condition worsens after claim closure.    Appeal Process: If you disagree with a written determination issued by the insurer or the insurer does not respond to your request, you may  appeal to the Department of Administration, , by following the instructions contained in your determination letter. You must  appeal the determination within 70 days from the date of the determination letter at [address], [address], [place], [place]  [address], or [address], [address], [place] [address]. If you disagree with the  decision, you may appeal to the  Department of Administration, . You must file your appeal within 30 days from the date of the  decision  letter at [address], [address], [place], [place] [address], or [address], [address], [place], [place] [address]. If you  disagree with a decision of an , you may file a petition for judicial review with the District Court. You must do so within 30  days of the Appeal Officer’s decision. You may be represented by an  at your own expense or you may contact the [facility] for possible  representation.    [place]  for Injured Workers (NAIW): If you disagree with a  decision, you may request that NAIW represent you  without charge at an  Hearing. For information regarding denial of benefits, you may contact the [facility] at:  [address], [address], [place] [address], [phone], or [address], [address], [place] [address], [phone]    To File a Complaint with the Division: If you wish to file a complaint with the  of the Division of Industrial Relations (DIR),  please contact the Workers’ Compensation Section, [address], [address], [place], [place] [address], telephone [phone]) [phone], or  [address], [address], [place], [place] [address], telephone [phone]) [phone].    For assistance with Workers’ Compensation Issues: you may contact the Office of the Governor Consumer Health Assistance, [address] [place] [address], [address], [place], [place] [address], Toll Free 1-775.280.1620, Web site: http://govcha.[facility].nv., E-mail  [email or web address]                                                                                                                                                                                                                                   __________________________________________________________________                                                                   _________________                Employee Name / Signature                                                                                                                                                       Date                                                                                                                                                                                                     D-2 (rev. 10/07)

## 2017-03-21 NOTE — Clinical Note
[facility]   [address] [place] [address]. [address] - [place] [address]  Phone: [phone] - Fax: [phone]        [facility] Progress Report and Disability Certification  Date of Service: 3/21/2017   No Show:  No  Date / Time of Next Visit: 3/24/2017 @1:00PM   Claim Information   Patient Name: Veronica Kirby  Claim Number:     Employer: [employer]  Date of Injury: 3/19/2017     Insurer / TPA: [employer] Workers [employer]  ID / SSN:     Occupation: Deli Assistant  Diagnosis: Diagnoses of Right knee injury, initial encounter, Contusion of right knee, initial encounter, Work related injury, and Pre-employment drug screening were pertinent to this visit.    Medical Information   Related to Industrial Injury? Yes    Subjective Complaints:  DOI: 3/19/17- She was in the kitchen at work, slipped and fell directly onto her bended knee. She had immediate pain and some swelling. Painful to bear weight when walking. Is wearing a knee brace, has iced her knee and taken Naproxen over the weekend hoping it would improve but it has not.    Objective Findings: Right knee: She exhibits decreased range of motion. She exhibits no swelling, no ecchymosis, no deformity, no erythema, no LCL laxity, normal patellar mobility and no MCL laxity. Tenderness found. Medial joint line and lateral joint line tenderness noted.   On exam, she is severely TTP over all aspects of the knee  Lachman negative  Varus and Valgus stress test negative  No locking or crepitus appreciated  Pain with flexion and weight bearing   CMS and neuro intact   Pre-Existing Condition(s): She reports she fell skiing 14 years ago and has had pain on and off in the right knee ever since. She also states that she has been seen several times in the ER for this pain, referred to Orthopedics but never went.   Assessment:   Initial Visit    Status: Additional Care Required  Permanent Disability:No    Plan:      Diagnostics:  X-ray  Comments:No evidence of acute fracture or dislocation.    Comments:  Work restrictions  RICE and knee immobilizer  Tylenol and Ibuprofen PRN pain  Follow up in 3 days    Disability Information   Status: Released to Restricted Duty    From:  3/21/2017  Through: 3/24/2017 Restrictions are: Temporary   Physical Restrictions   Sitting:    Standing:  < or = to 2 hrs/day Stooping:    Bending:      Squattin hrs/day Walking:  < or = to 1 hr/day Climbing:    Pushing:      Pulling:    Other:    Reaching Above Shoulder (L):   Reaching Above Shoulder (R):       Reaching Below Shoulder (L):    Reaching Below Shoulder (R):      Not to exceed Weight Limits   Carrying(hrs):   Weight Limit(lb):   Lifting(hrs):   Weight  Limit(lb):     Comments: Advised her that she should only stand for approximately 2 hours at a time    Repetitive Actions   Hands: i.e. Fine Manipulations from Grasping:     Feet: i.e. Operating Foot Controls:     Driving / Operate Machinery:     Physician Name: LEONARD Baer Physician Signature: JENA Sr e-Signature: Dr. Rojelio Whiting, Medical Director   Clinic Name / Location: [address]  [place] [address] Clinic Phone Number: Dept: [phone]   Appointment Time: 10:10 Am Visit Start Time: 10:09 AM   Check-In Time:  10:09 Am Visit Discharge Time:  11:31am   Original-Treating Physician or Chiropractor    Page 2-Insurer/TPA    Page 3-Employer    Page 4-Employee

## 2017-03-24 ENCOUNTER — OCCUPATIONAL MEDICINE (OUTPATIENT)
Dept: URGENT CARE | Facility: PHYSICIAN GROUP | Age: 27
End: 2017-03-24
Payer: MEDICARE

## 2017-03-24 VITALS
RESPIRATION RATE: 14 BRPM | TEMPERATURE: 98.4 F | WEIGHT: 138 LBS | OXYGEN SATURATION: 97 % | HEIGHT: 57 IN | BODY MASS INDEX: 29.77 KG/M2 | HEART RATE: 90 BPM | DIASTOLIC BLOOD PRESSURE: 74 MMHG | SYSTOLIC BLOOD PRESSURE: 112 MMHG

## 2017-03-24 DIAGNOSIS — S83.91XD SPRAIN OF RIGHT KNEE, UNSPECIFIED LIGAMENT, SUBSEQUENT ENCOUNTER: Primary | ICD-10-CM

## 2017-03-24 DIAGNOSIS — S80.01XD CONTUSION OF RIGHT KNEE, SUBSEQUENT ENCOUNTER: ICD-10-CM

## 2017-03-24 PROCEDURE — 99214 OFFICE O/P EST MOD 30 MIN: CPT | Mod: 29 | Performed by: PHYSICIAN ASSISTANT

## 2017-03-24 RX ORDER — KETOROLAC TROMETHAMINE 30 MG/ML
30 INJECTION, SOLUTION INTRAMUSCULAR; INTRAVENOUS ONCE
Status: COMPLETED | OUTPATIENT
Start: 2017-03-24 | End: 2017-03-24

## 2017-03-24 RX ADMIN — KETOROLAC TROMETHAMINE 30 MG: 30 INJECTION, SOLUTION INTRAMUSCULAR; INTRAVENOUS at 13:52

## 2017-03-24 ASSESSMENT — ENCOUNTER SYMPTOMS
TINGLING: 0
CARDIOVASCULAR NEGATIVE: 1
CONSTITUTIONAL NEGATIVE: 1
EYES NEGATIVE: 1
FALLS: 1
RESPIRATORY NEGATIVE: 1
PSYCHIATRIC NEGATIVE: 1
GASTROINTESTINAL NEGATIVE: 1

## 2017-03-24 NOTE — MR AVS SNAPSHOT
"        Veronica Kirby   3/24/2017 12:30 PM   Occupational Medicine   MRN: [id number]    Department:  [facility]   Dept Phone:  [phone]    Description:  Female : 1990   Provider:  Gianni Turcios PA-C           Reason for Visit     Follow-Up  follow up R. knee injury DOI 228576. Pt. states that the knee is starting to get better but walking a lot can increase the pain still.       Allergies as of 3/24/2017     Allergen Noted Reactions    Pcn [Penicillins] 2011   Anaphylaxis    Sulfa Drugs 2008   Anaphylaxis    Codeine 2008   Unspecified    Pt states \"I was a childhood allergie\".      You were diagnosed with     Sprain of right knee, unspecified ligament, subsequent encounter   [1008677]  -  Primary     Contusion of right knee, subsequent encounter   [200656]         Vital Signs     Blood Pressure Pulse Temperature Respirations Height Weight    112/74 mmHg 90 36.9 °C (98.4 °F) 14 1.448 m (4' 9.01\") 62.596 kg (138 lb)    Body Mass Index Oxygen Saturation Smoking Status             29.85 kg/m2 97% Current Every Day Smoker         Basic Information     Date Of Birth Sex Race Ethnicity Preferred Language    1990 Female White Non- English      Your appointments     Mar 29, 2017 11:00 AM   Workers Compensation with [facility])    [address] [place] [address]. [address]  [facility] [id number] [phone]              Health Maintenance        Date Due Completion Dates    PAP SMEAR 2010 ---    IMM INFLUENZA (1) 2016 ---    IMM DTaP/Tdap/Td Vaccine (2 - Td) 2026            Current Immunizations     Tdap Vaccine 2016  8:42 PM    Tuberculin Skin Test 2014 11:47 AM, 2014 11:30 AM      Below and/or attached are the medications your provider expects you to take. Review all of your home medications and newly ordered medications with your provider and/or pharmacist. Follow medication " instructions as directed by your provider and/or pharmacist. Please keep your medication list with you and share with your provider. Update the information when medications are discontinued, doses are changed, or new medications (including over-the-counter products) are added; and carry medication information at all times in the event of emergency situations     Allergies:  PCN - Anaphylaxis     SULFA DRUGS - Anaphylaxis     CODEINE - Unspecified               Medications  Valid as of: March 24, 2017 -  2:17 PM    Generic Name Brand Name Tablet Size Instructions for use    Hydrocodone-Acetaminophen (Tab) NORCO 5-325 MG Take 1-2 Tabs by mouth every four hours as needed.        Iron   Take 2 Tabs by mouth every day.        Melatonin   Take  by mouth.        Multiple Vitamins-Minerals (Tab) THERAGRAN-M  Take 1 Tab by mouth every day.        Naproxen (Tab) NAPROSYN 500 MG Take 500 mg by mouth as needed. Indications: Mild to Moderate Pain        .                 Medicines prescribed today were sent to:     [facility] #[id number] - [place], [place] - [address] [place] [address] AT [address] [address]    Phone: [phone] Fax: [phone]    Open 24 Hours?: No      Medication refill instructions:       If your prescription bottle indicates you have medication refills left, it is not necessary to call your provider’s office. Please contact your pharmacy and they will refill your medication.    If your prescription bottle indicates you do not have any refills left, you may request refills at any time through one of the following ways: The online Eventials system (except Urgent Care), by calling your provider’s office, or by asking your pharmacy to contact your provider’s office with a refill request. Medication refills are processed only during regular business hours and may not be available until the next business day. Your provider may request additional information or to have a follow-up visit with you  prior to refilling your medication.   *Please Note: Medication refills are assigned a new Rx number when refilled electronically. Your pharmacy may indicate that no refills were authorized even though a new prescription for the same medication is available at the pharmacy. Please request the medicine by name with the pharmacy before contacting your provider for a refill.        Instructions    Continue with RICE protocol.    Toradol again today.    Recommend continuing with work restrictions          Fieldbook Access Code: [email or web address]  Expires: 3/27/2017 11:13 AM    Fieldbook  A secure, online tool to manage your health information     [employer]’s Fieldbook® is a secure, online tool that connects you to your personalized health information from the privacy of your home -- day or night - making it very easy for you to manage your healthcare. Once the activation process is completed, you can even access your medical information using the Fieldbook niles, which is available for free in the Apple Niles store or Google Play store.     Fieldbook provides the following levels of access (as shown below):   My Chart Features   [facility] Doctor [facility]  Specialists [facility]  Urgent  Care Non-[facility]  Primary Care  Doctor   Email your healthcare team securely and privately 24/7 X X X    Manage appointments: schedule your next appointment; view details of past/upcoming appointments X      Request prescription refills. X      View recent personal medical records, including lab and immunizations X X X X   View health record, including health history, allergies, medications X X X X   Read reports about your outpatient visits, procedures, consult and ER notes X X X X   See your discharge summary, which is a recap of your hospital and/or ER visit that includes your diagnosis, lab results, and care plan. X X       How to register for Fieldbook:  1. Go to  [email or web address].  2. Click on the Sign Up Now box, which takes you  to the New Member Sign Up page. You will need to provide the following information:  a. Enter your VideoPros Access Code exactly as it appears at the top of this page. (You will not need to use this code after you’ve completed the sign-up process. If you do not sign up before the expiration date, you must request a new code.)   b. Enter your date of birth.   c. Enter your home email address.   d. Click Submit, and follow the next screen’s instructions.  3. Create a VideoPros ID. This will be your VideoPros login ID and cannot be changed, so think of one that is secure and easy to remember.  4. Create a VideoPros password. You can change your password at any time.  5. Enter your Password Reset Question and Answer. This can be used at a later time if you forget your password.   6. Enter your e-mail address. This allows you to receive e-mail notifications when new information is available in VideoPros.  7. Click Sign Up. You can now view your health information.    For assistance activating your VideoPros account, call [phone]        Quit Tobacco Information     Do you want to quit using tobacco?    Quitting tobacco decreases risks of cancer, heart and lung disease, increases life expectancy, improves sense of taste and smell, and increases spending money, among other benefits.    If you are thinking about quitting, we can help.  • [facility] Quit Tobacco Program: 784.475.7771  o Program occurs weekly for four weeks and includes pharmacist consultation on products to support quitting smoking or chewing tobacco. A provider referral is needed for pharmacist consultation.  • Tobacco Users Help Hotline: 3-800-QUIT-NOW (964-5954) or https://[place].quitlogix.org/  o Free, confidential telephone and online coaching for [place] residents. Sessions are designed on a schedule that is convenient for you. Eligible clients receive free nicotine replacement therapy.  • Nationally: www.smokefree.gov  o Information and professional assistance  to support both immediate and long-term needs as you become, and remain, a non-smoker. Smokefree.gov allows you to choose the help that best fits your needs.

## 2017-03-24 NOTE — PATIENT INSTRUCTIONS
Continue with RICE protocol.    Toradol again today.    Recommend continuing with work restrictions

## 2017-03-24 NOTE — Clinical Note
[facility]   [address] [place] [address]. [address] - [place] [address]  Phone: [phone] - Fax: [phone]        [facility] Progress Report and Disability Certification  Date of Service: 3/24/2017   No Show:  No  Date / Time of Next Visit: 3/29/2017   Claim Information   Patient Name: Veronica Kirby  Claim Number:     Employer: [employer]  Date of Injury: 3/19/2017     Insurer / TPA: [employer] Insurance  ID / SSN:     Occupation: Deli Assistant  Diagnosis: There were no encounter diagnoses.    Medical Information   Related to Industrial Injury? Yes    Subjective Complaints:  DOI: 3/19/17- She was in the kitchen at work, slipped and fell directly onto her bended right knee. She had immediate pain and some swelling. Painful to bear weight when walking. Is wearing a knee brace, has iced her knee and taken Naproxen over the weekend hoping it would improve but it has not. Has been wearing a knee brace.    Toradol injection in clinic at last visit with mild improvement.  Pain was 8/10.  5/10.  Has been doing ice/aleve.  Work restrictions in place: sitting on a stool.      No second job.   Objective Findings: Right Knee:  Mild prepatellar swelling.  Ttp to the MCL and medial patella border.  Knee with laxity to varus and valgus stress.      ROM: Extension 0, Flexion 90.   Patella tracking without crepitus.   No calf tenderness or clinical evidence of a DVT.      Motor Examination: Quad/hamstring 5/5 without atrophy.     Special Tests: Negative straight leg raise.  Negative McMurrays.      Sensation equal bilaterally to light touch for L4, L5, and S1.      NVS intact, DP 2+.  Capillary refill <2 seconds.      Gait and station antalgic, favoring right knee.   Pre-Existing Condition(s): none   Assessment:   Condition Improved    Status: Additional Care Required  Permanent Disability:No    Plan:   Comments:Continue with RICE protocol.  Toradol again today.   Recommend continuing with work restrictions.    Diagnostics:      Comments:       Disability Information   Status: Released to Restricted Duty    From:  3/24/2017  Through: 3/29/2017 Restrictions are: Temporary   Physical Restrictions   Sitting:    Standing:  < or = to 1 hr/day Stoopin hrs/day [place] hrs/day   Squattin hrs/day Walking:  < or = to 1 hr/day Climbin hrs/day Pushing:      Pulling:    Other:    Reaching Above Shoulder (L):   Reaching Above Shoulder (R):       Reaching Below Shoulder (L):    Reaching Below Shoulder (R):      Not to exceed Weight Limits   Carrying(hrs):   Weight Limit(lb):   Lifting(hrs):   Weight  Limit(lb):     Comments: Recommend no squatting or kneeling.  Recommend sitting and elevating knee.    Repetitive Actions   Hands: i.e. Fine Manipulations from Grasping:     Feet: i.e. Operating Foot Controls:     Driving / Operate Machinery:     Physician Name: iGanni Turcios PA-C Physician Signature: GIANNI Gallegos PA-C e-Signature: Dr. Rojelio Whiting, Medical Director   Clinic Name / Location: [address]  [place] [address] Clinic Phone Number: Dept: [phone]   Appointment Time: 12:30 Pm Visit Start Time: 12:56 PM   Check-In Time:  12:35 Pm Visit Discharge Time:  2:16pm   Original-Treating Physician or Chiropractor    Page 2-Insurer/TPA    Page 3-Employer    Page 4-Employee

## 2017-03-25 NOTE — PROGRESS NOTES
"Subjective:      Veronica Kirby is a 27 y.o. female who presents with Follow-Up      DOI: 3/19/17- She was in the kitchen at work, slipped and fell directly onto her bended right knee. She had immediate pain and some swelling. Painful to bear weight when walking. Is wearing a knee brace, has iced her knee and taken Naproxen over the weekend hoping it would improve but it has not. Has been wearing a knee brace.    Toradol injection in clinic at last visit with mild improvement.  Pain was 8/10.  5/10.  Has been doing ice/aleve.  Work restrictions in place: sitting on a stool.      No second job.     HPI    Review of Systems   Constitutional: Negative.    HENT: Negative.    Eyes: Negative.    Respiratory: Negative.    Cardiovascular: Negative.    Gastrointestinal: Negative.    Genitourinary: Negative.    Musculoskeletal: Positive for joint pain and falls.   Skin: Negative.    Neurological: Negative for tingling.   Endo/Heme/Allergies: Negative.    Psychiatric/Behavioral: Negative.           Objective:     /74 mmHg  Pulse 90  Temp(Src) 36.9 °C (98.4 °F)  Resp 14  Ht 1.448 m (4' 9.01\")  Wt 62.596 kg (138 lb)  BMI 29.85 kg/m2  SpO2 97%     Physical Exam    Constitutional:  Appropriately groomed, pleasant affect, well nourished, and in no acute distress.    HEENT:  Head: Atraumatic, normocephalic.    Ears:  Hearing grossly intact to voice.    Eyes:  Conjunctivae clear, sclera white, and medial canthus without exudate bilaterally.      Lungs:  Lungs with normal respiratory excursion and effort.      Right Knee:  Mild prepatellar swelling.  Ttp to the MCL and medial patella border.  Knee with laxity to varus and valgus stress.      ROM: Extension 0, Flexion 90.   Patella tracking without crepitus.   No calf tenderness or clinical evidence of a DVT.      Motor Examination: Quad/hamstring 5/5 without atrophy.     Special Tests: Negative straight leg raise.  Negative McMurrays.      Sensation equal bilaterally to light " touch for L4, L5, and S1.      NVS intact, DP 2+.  Capillary refill <2 seconds.      Gait and station antalgic, favoring right knee.     Derm:  No rashes or lesions with good turgor pressure.     Psychiatric:  Normal judgement, mood and affect.    Assessment/Plan:     1. Sprain of right knee, unspecified ligament, subsequent encounter    - ketorolac (TORADOL) injection 30 mg; 1 mL by Intramuscular route Once.    2. Contusion of right knee, subsequent encounter    PAtient presents with right knee pain improving since last appt. Continue with Rice and NSAIDs and job duty modifications.  Suspect discharge next apt.      Patient was in agreement with this treatment plan and seemed to understand without barriers. All questions were encouraged and answered.  Reviewed signs and symptoms of when to seek emergency medical care.     Please note that this dictation was created using voice recognition software.  I have made every reasonable attempt to correct obvious errors, but I expect there are errors of david and possibly content that I did not discover before finalizing the note.

## 2017-03-29 ENCOUNTER — OCCUPATIONAL MEDICINE (OUTPATIENT)
Dept: URGENT CARE | Facility: PHYSICIAN GROUP | Age: 27
End: 2017-03-29
Payer: MEDICARE

## 2017-03-29 VITALS
SYSTOLIC BLOOD PRESSURE: 126 MMHG | WEIGHT: 138 LBS | HEART RATE: 91 BPM | BODY MASS INDEX: 29.77 KG/M2 | TEMPERATURE: 98.4 F | DIASTOLIC BLOOD PRESSURE: 72 MMHG | OXYGEN SATURATION: 99 % | RESPIRATION RATE: 14 BRPM | HEIGHT: 57 IN

## 2017-03-29 DIAGNOSIS — S80.01XD CONTUSION OF RIGHT KNEE, SUBSEQUENT ENCOUNTER: ICD-10-CM

## 2017-03-29 PROCEDURE — 99214 OFFICE O/P EST MOD 30 MIN: CPT | Performed by: FAMILY MEDICINE

## 2017-03-29 NOTE — Clinical Note
[facility]   [address] [place] [address]. [address] - [place] [address]  Phone: [phone] - Fax: [phone]        [facility] Progress Report and Disability Certification  Date of Service: 3/29/2017   No Show:  No  Date / Time of Next Visit: 3/31/2017@1:00PM   Claim Information   Patient Name: Veronica Kirby  Claim Number:     Employer: [employer]  Date of Injury: 3/19/2017     Insurer / TPA: [employer]  ID / SSN:     Occupation: Deli Assistant  Diagnosis: The encounter diagnosis was Contusion of right knee, subsequent encounter.    Medical Information   Related to Industrial Injury? Yes    Subjective Complaints:  DOI: 3/19/17 patient comes to follow up on her right knee contusion injury. She was in the kitchen at work, slipped and fell directly onto her bended right knee. She had immediate pain and some swelling. She had been doing better with ice and Naprosyn and brace use this is her third visit to the urgent care. Sunday she was standing for 8 hours continuously due to being short staffed although her limitations recommended standing for less than or equal to one hour throughout her shift. She states by the end of the evening her knee was very painful there was more swelling present. Today it feels worse overall. She has a 8 hour shift coming up this evening. No fevers or chills. No numbness tingling or weakness distally.   Objective Findings: Physical Exam   Constitutional: She is oriented to person, place, and time. She appears well-developed and well-nourished. No distress.   HENT:   Mouth/Throat: Oropharynx is clear and moist.   Cardiovascular: Normal rate.    Pulmonary/Chest: Effort normal.   Musculoskeletal: Normal range of motion. She exhibits edema and tenderness.        Right knee: She exhibits swelling. She exhibits normal range of motion, no erythema, no LCL laxity and no MCL laxity. Tenderness found. Medial joint line tenderness  noted.   Right knee stable to exam, tender along medial aspect   Neurological: She is alert and oriented to person, place, and time. Coordination normal.   Skin: Skin is warm and dry. She is not diaphoretic. No erythema.   Psychiatric: She has a normal mood and affect. Her behavior is normal.      Pre-Existing Condition(s):     Assessment:   Condition Worsened    Status: Additional Care Required  Permanent Disability:No    Plan:   Comments:otc naproxen    Diagnostics:      Comments:       Disability Information   Status: Released to Restricted Duty    From:  3/29/2017  Through: 3/31/2017 Restrictions are: Temporary   Physical Restrictions   Sitting:    Standing:  < or = to 2 hrs/day Stooping:    Bending:  < or = to 2 hrs/day   Squattin hrs/day Walking:    Climbing:    Pushing:      Pulling:    Other:    Reaching Above Shoulder (L):   Reaching Above Shoulder (R):       Reaching Below Shoulder (L):    Reaching Below Shoulder (R):      Not to exceed Weight Limits   Carrying(hrs):   Weight Limit(lb): < or = to 25 pounds Lifting(hrs):   Weight  Limit(lb): < or = to 25 pounds   Comments: Patient should work half of her normal shift hours for the next two days    Repetitive Actions   Hands: i.e. Fine Manipulations from Grasping:     Feet: i.e. Operating Foot Controls:     Driving / Operate Machinery:     Physician Name: Cody Gomez D.O. Physician Signature: CODY Yan D.O. e-Signature: Dr. Rojelio Whiting, Medical Director   Clinic Name / Location: [address]  [place] [address] Clinic Phone Number: Dept: [phone]   Appointment Time: 11:00 Am Visit Start Time: 10:43 AM   Check-In Time:  10:38 Am Visit Discharge Time:  11:25AM   Original-Treating Physician or Chiropractor    Page 2-Insurer/TPA    Page 3-Employer    Page 4-Employee

## 2017-03-29 NOTE — MR AVS SNAPSHOT
"        Veronica Kirby   3/29/2017 11:00 AM   Occupational Medicine   MRN: [id number]    Department:  [facility]   Dept Phone:  [phone]    Description:  Female : 1990   Provider:  Caro Gomez D.O.           Reason for Visit     Follow-Up WC R. knee injury Pt. states that she is still having pain in her knee. Pt. also states that being standing for work all day  made it worse.       Allergies as of 3/29/2017     Allergen Noted Reactions    Pcn [Penicillins] 2011   Anaphylaxis    Sulfa Drugs 2008   Anaphylaxis    Codeine 2008   Unspecified    Pt states \"I was a childhood allergie\".      You were diagnosed with     Contusion of right knee, subsequent encounter   [014536]         Vital Signs     Blood Pressure Pulse Temperature Respirations Height Weight    126/72 mmHg 91 36.9 °C (98.4 °F) 14 1.448 m (4' 9\") 62.596 kg (138 lb)    Body Mass Index Oxygen Saturation Smoking Status             29.85 kg/m2 99% Current Every Day Smoker         Basic Information     Date Of Birth Sex Race Ethnicity Preferred Language    1990 Female White Non- English      Your appointments     Mar 31, 2017  1:00 PM   Workers Compensation with [facility] [facility]    [address] [place] [address]. [address]  [facility] [address] [phone]              Health Maintenance        Date Due Completion Dates    PAP SMEAR 2010 ---    IMM INFLUENZA (1) 2016 ---    IMM DTaP/Tdap/Td Vaccine (2 - Td) 2026            Current Immunizations     Tdap Vaccine 2016  8:42 PM    Tuberculin Skin Test 2014 11:47 AM, 2014 11:30 AM      Below and/or attached are the medications your provider expects you to take. Review all of your home medications and newly ordered medications with your provider and/or pharmacist. Follow medication instructions as directed by your provider and/or pharmacist. Please keep your medication list with " you and share with your provider. Update the information when medications are discontinued, doses are changed, or new medications (including over-the-counter products) are added; and carry medication information at all times in the event of emergency situations     Allergies:  PCN - Anaphylaxis     SULFA DRUGS - Anaphylaxis     CODEINE - Unspecified               Medications  Valid as of: March 29, 2017 - 11:26 AM    Generic Name Brand Name Tablet Size Instructions for use    Hydrocodone-Acetaminophen (Tab) NORCO 5-325 MG Take 1-2 Tabs by mouth every four hours as needed.        Iron   Take 2 Tabs by mouth every day.        Melatonin   Take  by mouth.        Multiple Vitamins-Minerals (Tab) THERAGRAN-M  Take 1 Tab by mouth every day.        Naproxen (Tab) NAPROSYN 500 MG Take 500 mg by mouth as needed. Indications: Mild to Moderate Pain        .                 Medicines prescribed today were sent to:     [facility][id number] - [place], [place] - [address] [address] [facility] [address]    Phone: [phone] Fax: [phone]    Open 24 Hours?: No      Medication refill instructions:       If your prescription bottle indicates you have medication refills left, it is not necessary to call your provider’s office. Please contact your pharmacy and they will refill your medication.    If your prescription bottle indicates you do not have any refills left, you may request refills at any time through one of the following ways: The online Koalify system (except Urgent Care), by calling your provider’s office, or by asking your pharmacy to contact your provider’s office with a refill request. Medication refills are processed only during regular business hours and may not be available until the next business day. Your provider may request additional information or to have a follow-up visit with you prior to refilling your medication.   *Please Note: Medication refills are assigned a new Rx number  when refilled electronically. Your pharmacy may indicate that no refills were authorized even though a new prescription for the same medication is available at the pharmacy. Please request the medicine by name with the pharmacy before contacting your provider for a refill.           Affinity Circles Access Code: [email or web address]  Expires: 4/25/2017 11:05 AM    Affinity Circles  A secure, online tool to manage your health information     [employer]’s Affinity Circles® is a secure, online tool that connects you to your personalized health information from the privacy of your home -- day or night - making it very easy for you to manage your healthcare. Once the activation process is completed, you can even access your medical information using the Affinity Circles niles, which is available for free in the Apple Niles store or Google Play store.     Affinity Circles provides the following levels of access (as shown below):   My Chart Features   [facility] Doctor [facility]  Specialists [facility]  Urgent  Care [facility]  Primary Care  Doctor   Email your healthcare team securely and privately 24/7 X X X    Manage appointments: schedule your next appointment; view details of past/upcoming appointments X      Request prescription refills. X      View recent personal medical records, including lab and immunizations X X X X   View health record, including health history, allergies, medications X X X X   Read reports about your outpatient visits, procedures, consult and ER notes X X X X   See your discharge summary, which is a recap of your hospital and/or ER visit that includes your diagnosis, lab results, and care plan. X X       How to register for Affinity Circles:  1. Go to  [email or web address].  2. Click on the Sign Up Now box, which takes you to the New Member Sign Up page. You will need to provide the following information:  a. Enter your Affinity Circles Access Code exactly as it appears at the top of this page. (You will not need to use this code after you’ve  completed the sign-up process. If you do not sign up before the expiration date, you must request a new code.)   b. Enter your date of birth.   c. Enter your home email address.   d. Click Submit, and follow the next screen’s instructions.  3. Create a Whiphandt ID. This will be your PostalGuard login ID and cannot be changed, so think of one that is secure and easy to remember.  4. Create a W[employer]handt password. You can change your password at any time.  5. Enter your Password Reset Question and Answer. This can be used at a later time if you forget your password.   6. Enter your e-mail address. This allows you to receive e-mail notifications when new information is available in PostalGuard.  7. Click Sign Up. You can now view your health information.    For assistance activating your PostalGuard account, call [phone]        Quit Tobacco Information     Do you want to quit using tobacco?    Quitting tobacco decreases risks of cancer, heart and lung disease, increases life expectancy, improves sense of taste and smell, and increases spending money, among other benefits.    If you are thinking about quitting, we can help.  • [facility] Quit [facility] Program: 640.911.6939  o Program occurs weekly for four weeks and includes pharmacist consultation on products to support quitting smoking or chewing tobacco. A provider referral is needed for pharmacist consultation.  • Tobacco Users Help Hotline: 8-800QUIT-NOW (039-7644) or https://[place].quitlogix.org/  o Free, confidential telephone and online coaching for [place] residents. Sessions are designed on a schedule that is convenient for you. Eligible clients receive free nicotine replacement therapy.  • Nationally: www.smokefree.gov  o Information and professional assistance to support both immediate and long-term needs as you become, and remain, a non-smoker. Smokefree.gov allows you to choose the help that best fits your needs.

## 2017-03-29 NOTE — PROGRESS NOTES
"Subjective:      Veronica Kirby is a 27 y.o. female who presents with Follow-Up      DOI: 3/19/17 patient comes to follow up on her right knee contusion injury. She was in the kitchen at work, slipped and fell directly onto her bended right knee. She had immediate pain and some swelling. She had been doing better with ice and Naprosyn and brace use this is her third visit to the urgent care. Sunday she was standing for 8 hours continuously due to being short staffed although her limitations recommended standing for less than or equal to one hour throughout her shift. She states by the end of the evening her knee was very painful there was more swelling present. Today it feels worse overall. She has a 8 hour shift coming up this evening. No fevers or chills. No numbness tingling or weakness distally.     HPI  ROS  PMH:  has no past medical history on file.  MEDS:   Current outpatient prescriptions:   •  MELATONIN PO, Take  by mouth., Disp: , Rfl:   •  therapeutic multivitamin-minerals (THERAGRAN-M) Tab, Take 1 Tab by mouth every day., Disp: , Rfl:   •  IRON PO, Take 2 Tabs by mouth every day., Disp: , Rfl:   •  naproxen (NAPROSYN) 500 MG Tab, Take 500 mg by mouth as needed. Indications: Mild to Moderate Pain, Disp: , Rfl:   •  hydrocodone-acetaminophen (NORCO) 5-325 MG Tab per tablet, Take 1-2 Tabs by mouth every four hours as needed., Disp: 14 Tab, Rfl: 0  ALLERGIES:   Allergies   Allergen Reactions   • Pcn [Penicillins] Anaphylaxis   • Sulfa Drugs Anaphylaxis   • Codeine Unspecified     Pt states \"I was a childhood allergie\".   SURGHX: No past surgical history on file.  SOCHX:  reports that she has been smoking.  She does not have any smokeless tobacco history on file. She reports that she drinks alcohol. She reports that she does not use illicit drugs.  FH: Family history was reviewed, no pertinent findings to report     Objective:     /72 mmHg  Pulse 91  Temp(Src) 36.9 °C (98.4 °F)  Resp 14  Ht 1.448 m (4' " "9\")  Wt 62.596 kg (138 lb)  BMI 29.85 kg/m2  SpO2 99%     Physical Exam   Constitutional: She is oriented to person, place, and time. She appears well-developed and well-nourished. No distress.   HENT:   Mouth/Throat: Oropharynx is clear and moist.   Cardiovascular: Normal rate.    Pulmonary/Chest: Effort normal.   Musculoskeletal: Normal range of motion. She exhibits edema and tenderness.        Right knee: She exhibits swelling. She exhibits normal range of motion, no erythema, no LCL laxity and no MCL laxity. Tenderness found. Medial joint line tenderness noted.   Right knee stable to exam, tender along medial aspect   Neurological: She is alert and oriented to person, place, and time. Coordination normal.   Skin: Skin is warm and dry. She is not diaphoretic. No erythema.   Psychiatric: She has a normal mood and affect. Her behavior is normal.            Assessment/Plan:     1. Contusion of right knee, subsequent encounter    Modified restrictions as setback in healing likely due to standing continuously for 8 hrs at work on Sunday  re-eval on Friday if require further intervention refer to [facility]  con't to ice and use naproxen and brace        "

## 2017-03-31 ENCOUNTER — OCCUPATIONAL MEDICINE (OUTPATIENT)
Dept: URGENT CARE | Facility: PHYSICIAN GROUP | Age: 27
End: 2017-03-31
Payer: MEDICARE

## 2017-03-31 VITALS
BODY MASS INDEX: 29.77 KG/M2 | RESPIRATION RATE: 16 BRPM | WEIGHT: 138 LBS | HEIGHT: 57 IN | HEART RATE: 96 BPM | OXYGEN SATURATION: 100 % | SYSTOLIC BLOOD PRESSURE: 116 MMHG | DIASTOLIC BLOOD PRESSURE: 74 MMHG | TEMPERATURE: 97.9 F

## 2017-03-31 DIAGNOSIS — S80.01XD CONTUSION OF RIGHT KNEE, SUBSEQUENT ENCOUNTER: ICD-10-CM

## 2017-03-31 PROCEDURE — 99213 OFFICE O/P EST LOW 20 MIN: CPT | Performed by: EMERGENCY MEDICINE

## 2017-03-31 RX ORDER — ASCORBIC ACID 500 MG
500 TABLET ORAL DAILY
COMMUNITY

## 2017-03-31 ASSESSMENT — PAIN SCALES - GENERAL: PAINLEVEL: 2=MINIMAL-SLIGHT

## 2017-03-31 ASSESSMENT — ENCOUNTER SYMPTOMS
MYALGIAS: 0
ABDOMINAL PAIN: 0
NERVOUS/ANXIOUS: 0
FOCAL WEAKNESS: 0
FALLS: 1
SPEECH CHANGE: 0
NAUSEA: 0
CHILLS: 0
FEVER: 0
VOMITING: 0
JOINT SWELLING: 1
COUGH: 0

## 2017-03-31 NOTE — Clinical Note
[facility]   [address] - [place] [address]  Phone: [phone] - Fax: [phone]        [facility] Progress Report and Disability Certification  Date of Service: 3/31/2017   No Show:  No  Date / Time of Next Visit: 4/4/2017@10:00AM   Claim Information   Patient Name: Veronica Kirby  Claim Number:     Employer: [employer]  Date of Injury: 3/19/2017     Insurer / TPA: [employer]  ID / SSN:     Occupation: Deli Assistant Diagnosis: There were no encounter diagnoses.    Medical Information   Related to Industrial Injury? Yes   Subjective Complaints:  Date of injury 3/19/17. Patient returns for reevaluation following right knee contusion with negative x-rays. Patient initially was working at [employer] when she slipped landing on her bending right knee which has improved from a 6/10 2 days ago to a 2/10 today she says she has minimal pain but still has a clicking in her knee that she cannot reproduce at this visit. As the only person on duty at the [employer] when she is working.   Objective Findings: Blood pressure is 116/74, temperature is 36.6, heart rate is 96 and regular, respiratory rate 16 with a pulse oximetry of 100% on room air. Patient has tenderness on the medial aspect of her right knee there is no swelling redness or warmth. Her quadriceps mechanism is intact. She has joint line tenderness on the right she also has medial tenderness with abduction but no tenderness with abduction on either side. Her anterior drawer sign is intact. Ambulating she cannot reproduce a click that she says occurs approximately every 15th step area   Pre-Existing Condition(s):     Assessment:   Condition Improved    Status: Additional Care Required  Permanent Disability:No    Plan:   Comments:patient will continue over-the-counter NSAIDs for pain relief. Will advance her work duties.    Diagnostics:   Comments:previous x-rays  negative.    Comments:       Disability Information   Status: Released to Restricted Duty    From:  3/31/2017  Through: 2017 Restrictions are: Temporary   Physical Restrictions   Sitting:    Standing:  < or = to 4 hrs/day Stooping:    Bending:  < or = to 2 hrs/day   Squattin hrs/day Walking:    Climbing:    Pushing:      Pulling:    Other:    Reaching Above Shoulder (L):   Reaching Above Shoulder (R):       Reaching Below Shoulder (L):    Reaching Below Shoulder (R):      Not to exceed Weight Limits   Carrying(hrs):   Weight Limit(lb): < or = to 25 pounds Lifting(hrs):   Weight  Limit(lb): < or = to 25 pounds   Comments: Patient worked full shifts., Please allow her to sit between 4 hour of standing    Repetitive Actions   Hands: i.e. Fine Manipulations from Grasping:     Feet: i.e. Operating Foot Controls:     Driving / Operate Machinery:     Physician Name: BUDDY Rick M.D. Physician Signature: BUDDY Joyner M.D. e-Signature: Dr. Rojelio Whiting, Medical Director   Clinic Name / Location: [address]  [place] [address] Clinic Phone Number: Dept: [phone]   Appointment Time: 1:00 Pm Visit Start Time: 12:40 PM   Check-In Time:  12:30 Pm Visit Discharge Time: 1:25 PM   Original-Treating Physician or Chiropractor    Page 2-Insurer/TPA    Page 3-Employer    Page 4-Employee

## 2017-03-31 NOTE — PROGRESS NOTES
"Subjective:      Veronica Kirby is a 27 y.o. female who presents with Knee Injury      Date of injury 3/19/17. Patient returns for reevaluation following right knee contusion with negative x-rays. Patient initially was working at [employer] when she slipped landing on her bending right knee which has improved from a 6/10 2 days ago to a 2/10 today she says she has minimal pain but still has a clicking in her knee that she cannot reproduce at this visit. As the only person on duty at the [employer] when she is working.     Knee Injury  This is a new (this is the fourth visit for a very pleasant 27-year-old female injured her right knee at work on 3/19/17.) problem. The problem occurs constantly. The problem has been gradually improving. Associated symptoms include joint swelling. Pertinent negatives include no abdominal pain, chest pain, chills, congestion, coughing, fever, myalgias, nausea, rash or vomiting. Treatments tried: resting her right knee as well as using NSAIDs./Acetaminophen.     Allergies   Allergen Reactions   • Pcn [Penicillins] Anaphylaxis   • Sulfa Drugs Anaphylaxis   • Codeine Unspecified     Pt states \"I was a childhood allergie\".     Social History     Social History   • Marital Status: Single     Spouse Name: N/A   • Number of Children: N/A   • Years of Education: N/A     Occupational History   • Not on file.     Social History Main Topics   • Smoking status: Current Every Day Smoker -- 0.50 packs/day   • Smokeless tobacco: Not on file   • Alcohol Use: 0.0 oz/week     0 Standard drinks or equivalent per week      Comment: social   • Drug Use: No   • Sexual Activity: Not on file     Other Topics Concern   • Not on file     Social History Narrative   No past medical history on file.   Current Outpatient Prescriptions on File Prior to Visit   Medication Sig Dispense Refill   • MELATONIN PO Take  by mouth.     • hydrocodone-acetaminophen (NORCO) 5-325 MG Tab per tablet Take 1-2 Tabs by mouth every " "four hours as needed. 14 Tab 0   • therapeutic multivitamin-minerals (THERAGRAN-M) Tab Take 1 Tab by mouth every day.     • IRON PO Take 2 Tabs by mouth every day.     • naproxen (NAPROSYN) 500 MG Tab Take 500 mg by mouth as needed. Indications: Mild to Moderate Pain       No current facility-administered medications on file prior to visit.   No family history on file.    Review of Systems   Constitutional: Negative for fever and chills.   HENT: Negative for congestion.    Respiratory: Negative for cough.    Cardiovascular: Negative for chest pain.   Gastrointestinal: Negative for nausea, vomiting and abdominal pain.   Musculoskeletal: Positive for joint pain, falls and joint swelling. Negative for myalgias.   Skin: Negative for rash.        No redness or evidence of cellulitis over the patient's right knee.   Neurological: Negative for speech change and focal weakness.   Psychiatric/Behavioral: The patient is not nervous/anxious.           Objective:     /74 mmHg  Pulse 96  Temp(Src) 36.6 °C (97.9 °F)  Resp 16  Ht 1.448 m (4' 9\")  Wt 62.596 kg (138 lb)  BMI 29.85 kg/m2  SpO2 100%  Breastfeeding? No     Physical Exam   Constitutional: She appears well-developed and well-nourished. No distress.   HENT:   Head: Normocephalic and atraumatic.   Eyes: Conjunctivae are normal. Right eye exhibits no discharge. Left eye exhibits no discharge.   Neck: Normal range of motion.   Cardiovascular: Normal rate.    Pulmonary/Chest: Effort normal.   Musculoskeletal: Normal range of motion. She exhibits tenderness. She exhibits no edema.   Medial joint line with some abduction pain on the medial aspects. She has no abduction pain on the joint line however. It is tender to the outpatient. She has a negative porcine. Quadriceps mechanism is intact.    I ambulated the patient and she cannot reproduce the clicking sound she says it only happens occasionally.   Neurological: She is alert.   Skin: Skin is warm. No rash noted. " She is not diaphoretic. No erythema.   Psychiatric: She has a normal mood and affect. Her behavior is normal.   Vitals reviewed.              Assessment/Plan:     Diagnosis acute contusion to right knee/improved.    Please refer to the D-39 form patient will return for reevaluation.

## 2017-03-31 NOTE — MR AVS SNAPSHOT
"        Veronica Kirby   3/31/2017 1:00 PM   Occupational Medicine   MRN: [id number]    Department:  [facility]   Dept Phone:  [phone]    Description:  Female : 1990   Provider:  BUDDY Rick M.D.           Reason for Visit     Knee Injury WC FU 17 Rt Knee Injury. Pt states that knee is doing much better but some pain still lingers when walking.      Allergies as of 3/31/2017     Allergen Noted Reactions    Pcn [Penicillins] 2011   Anaphylaxis    Sulfa Drugs 2008   Anaphylaxis    Codeine 2008   Unspecified    Pt states \"I was a childhood allergie\".      Vital Signs     Blood Pressure Pulse Temperature Respirations Height Weight    116/74 mmHg 96 36.6 °C (97.9 °F) 16 1.448 m (4' 9\") 62.596 kg (138 lb)    Body Mass Index Oxygen Saturation Breastfeeding? Smoking Status          29.85 kg/m2 100% No Current Every Day Smoker        Basic Information     Date Of Birth Sex Race Ethnicity Preferred Language    1990 Female White Non- English      Health Maintenance        Date Due Completion Dates    PAP SMEAR 2010 ---    IMM INFLUENZA (1) 2016 ---    IMM DTaP/Tdap/Td Vaccine (2 - Td) 2026            Current Immunizations     Tdap Vaccine 2016  8:42 PM    Tuberculin Skin Test 2014 11:47 AM, 2014 11:30 AM      Below and/or attached are the medications your provider expects you to take. Review all of your home medications and newly ordered medications with your provider and/or pharmacist. Follow medication instructions as directed by your provider and/or pharmacist. Please keep your medication list with you and share with your provider. Update the information when medications are discontinued, doses are changed, or new medications (including over-the-counter products) are added; and carry medication information at all times in the event of emergency situations     Allergies:  PCN - Anaphylaxis     SULFA DRUGS - Anaphylaxis     " CODEINE - Unspecified               Medications  Valid as of: March 31, 2017 -  1:25 PM    Generic Name Brand Name Tablet Size Instructions for use    Ascorbic Acid (Tab) ascorbic acid 500 MG Take 500 mg by mouth every day.        Hydrocodone-Acetaminophen (Tab) NORCO 5-325 MG Take 1-2 Tabs by mouth every four hours as needed.        Iron   Take 2 Tabs by mouth every day.        Melatonin   Take  by mouth.        Multiple Vitamins-Minerals (Tab) THERAGRAN-M  Take 1 Tab by mouth every day.        Naproxen (Tab) NAPROSYN 500 MG Take 500 mg by mouth as needed. Indications: Mild to Moderate Pain        .                 Medicines prescribed today were sent to:     [facility]Y #[id number] - [place], [place] - [address]    [address] [place] [address]    Phone: [phone] Fax: [phone]    Open 24 Hours?: No      Medication refill instructions:       If your prescription bottle indicates you have medication refills left, it is not necessary to call your provider’s office. Please contact your pharmacy and they will refill your medication.    If your prescription bottle indicates you do not have any refills left, you may request refills at any time through one of the following ways: The online [employer] system (except Urgent Care), by calling your provider’s office, or by asking your pharmacy to contact your provider’s office with a refill request. Medication refills are processed only during regular business hours and may not be available until the next business day. Your provider may request additional information or to have a follow-up visit with you prior to refilling your medication.   *Please Note: Medication refills are assigned a new Rx number when refilled electronically. Your pharmacy may indicate that no refills were authorized even though a new prescription for the same medication is available at the pharmacy. Please request the medicine by name with the pharmacy before contacting your  provider for a refill.           [employer] Access Code: [email or web address]  Expires: 4/25/2017 11:05 AM    [employer]  A secure, online tool to manage your health information     [employer]’s [employer]® is a secure, online tool that connects you to your personalized health information from the privacy of your home -- day or night - making it very easy for you to manage your healthcare. Once the activation process is completed, you can even access your medical information using the [employer] niles, which is available for free in the Apple Niles store or Google Play store.     [employer] provides the following levels of access (as shown below):   My Chart Features   [facility] Primary Care Doctor [facility]  Specialists [facility]  Urgent  Care Non-[facility]  Primary Care  Doctor   Email your healthcare team securely and privately 24/7 X X X    Manage appointments: schedule your next appointment; view details of past/upcoming appointments X      Request prescription refills. X      View recent personal medical records, including lab and immunizations X X X X   View health record, including health history, allergies, medications X X X X   Read reports about your outpatient visits, procedures, consult and ER notes X X X X   See your discharge summary, which is a recap of your hospital and/or ER visit that includes your diagnosis, lab results, and care plan. X X       How to register for [employer]:  1. Go to  [email or web address].  2. Click on the Sign Up Now box, which takes you to the New Member Sign Up page. You will need to provide the following information:  a. Enter your [employer] Access Code exactly as it appears at the top of this page. (You will not need to use this code after you’ve completed the sign-up process. If you do not sign up before the expiration date, you must request a new code.)   b. Enter your date of birth.   c. Enter your home email address.   d. Click Submit, and follow the next screen’s instructions.  3. Create a  [employer] ID. This will be your [employer] login ID and cannot be changed, so think of one that is secure and easy to remember.  4. Create a [employer] password. You can change your password at any time.  5. Enter your Password Reset Question and Answer. This can be used at a later time if you forget your password.   6. Enter your e-mail address. This allows you to receive e-mail notifications when new information is available in [employer].  7. Click Sign Up. You can now view your health information.    For assistance activating your [employer] account, call [phone]        Quit Tobacco Information     Do you want to quit using tobacco?    Quitting tobacco decreases risks of cancer, heart and lung disease, increases life expectancy, improves sense of taste and smell, and increases spending money, among other benefits.    If you are thinking about quitting, we can help.  • [facility] Quit Tobacco Program: [phone]  o Program occurs weekly for four weeks and includes pharmacist consultation on products to support quitting smoking or chewing tobacco. A provider referral is needed for pharmacist consultation.  • Tobacco Users Help Hotline: 0-279-QUIT-NOW (115-3720) or https://[place].quitlogix.org/  o Free, confidential telephone and online coaching for [place] residents. Sessions are designed on a schedule that is convenient for you. Eligible clients receive free nicotine replacement therapy.  • Nationally: www.smokefree.gov  o Information and professional assistance to support both immediate and long-term needs as you become, and remain, a non-smoker. Smokefree.gov allows you to choose the help that best fits your needs.

## 2017-04-04 ENCOUNTER — OCCUPATIONAL MEDICINE (OUTPATIENT)
Dept: URGENT CARE | Facility: PHYSICIAN GROUP | Age: 27
End: 2017-04-04
Payer: MEDICARE

## 2017-04-04 VITALS
OXYGEN SATURATION: 100 % | WEIGHT: 142 LBS | DIASTOLIC BLOOD PRESSURE: 68 MMHG | TEMPERATURE: 97.9 F | HEIGHT: 57 IN | BODY MASS INDEX: 30.63 KG/M2 | SYSTOLIC BLOOD PRESSURE: 124 MMHG | RESPIRATION RATE: 12 BRPM | HEART RATE: 95 BPM

## 2017-04-04 DIAGNOSIS — S86.911D KNEE STRAIN, RIGHT, SUBSEQUENT ENCOUNTER: ICD-10-CM

## 2017-04-04 DIAGNOSIS — S80.01XD CONTUSION OF RIGHT KNEE, SUBSEQUENT ENCOUNTER: ICD-10-CM

## 2017-04-04 PROCEDURE — 99213 OFFICE O/P EST LOW 20 MIN: CPT | Mod: 29 | Performed by: NURSE PRACTITIONER

## 2017-04-04 ASSESSMENT — ENCOUNTER SYMPTOMS
CHILLS: 0
FEVER: 0
BRUISES/BLEEDS EASILY: 0
FALLS: 1
SENSORY CHANGE: 0
MYALGIAS: 1
WEAKNESS: 0
TINGLING: 0

## 2017-04-04 NOTE — PROGRESS NOTES
"Subjective:      Veronica Kirby is a 27 y.o. female who presents with Follow-Up      DOI 3/19/17. 5th encounter. Slipped and fell on right bent knee. Negative knee xray on initial visit. States pain is still constant at 2-3 pain level with walking, states no improvement. Taking Ibuprofen. Ice application when she gets home. No topical analgesic. States still \"hears clicking noise\" when walking. States pain increases after \"3 hours\" when walking. States using the RICE method but no ace wrap at work used, states \"might make worse not moving it\".     HPI  See above    PMH:  has no past medical history on file.  MEDS:   Current outpatient prescriptions:   •  ascorbic acid (ASCORBIC ACID) 500 MG Tab, Take 500 mg by mouth every day., Disp: , Rfl:   •  MELATONIN PO, Take  by mouth., Disp: , Rfl:   •  hydrocodone-acetaminophen (NORCO) 5-325 MG Tab per tablet, Take 1-2 Tabs by mouth every four hours as needed., Disp: 14 Tab, Rfl: 0  •  therapeutic multivitamin-minerals (THERAGRAN-M) Tab, Take 1 Tab by mouth every day., Disp: , Rfl:   •  IRON PO, Take 2 Tabs by mouth every day., Disp: , Rfl:   •  naproxen (NAPROSYN) 500 MG Tab, Take 500 mg by mouth as needed. Indications: Mild to Moderate Pain, Disp: , Rfl:   ALLERGIES:   Allergies   Allergen Reactions   • Pcn [Penicillins] Anaphylaxis   • Sulfa Drugs Anaphylaxis   • Codeine Unspecified     Pt states \"I was a childhood allergie\".     SURGHX: No past surgical history on file.  SOCHX:  reports that she has been smoking.  She does not have any smokeless tobacco history on file. She reports that she drinks alcohol. She reports that she does not use illicit drugs.  FH: Family history was reviewed, no pertinent findings to report    Review of Systems   Constitutional: Negative for fever, chills and malaise/fatigue.   Musculoskeletal: Positive for myalgias, joint pain and falls.   Neurological: Negative for tingling, sensory change and weakness.   Endo/Heme/Allergies: Does not " "bruise/bleed easily.   All other systems reviewed and are negative.         Objective:     /68 mmHg  Pulse 95  Temp(Src) 36.6 °C (97.9 °F)  Resp 12  Ht 1.448 m (4' 9.01\")  Wt 64.411 kg (142 lb)  BMI 30.72 kg/m2  SpO2 100%     Physical Exam   Constitutional: She is oriented to person, place, and time. She appears well-developed and well-nourished. No distress.   HENT:   Head: Normocephalic.   Eyes: Conjunctivae and EOM are normal. Pupils are equal, round, and reactive to light.   Neck: Normal range of motion. Neck supple.   Cardiovascular: Normal rate.    Pulmonary/Chest: Effort normal.   Musculoskeletal:        Right knee: She exhibits decreased range of motion and swelling. She exhibits no effusion, no ecchymosis, no deformity, no laceration, no erythema, normal alignment, no LCL laxity, normal patellar mobility, no bony tenderness, normal meniscus and no MCL laxity. Tenderness found. Medial joint line tenderness noted.        Legs:  Neurological: She is alert and oriented to person, place, and time.   Skin: Skin is warm and dry. She is not diaphoretic.   Vitals reviewed.      A/O x 4. TTP to medial aspect and anterior quadricep muscle. No problems with flexion of right knee joint. Full extension of right knee causes \"pulling feeling\"/discomfort. Minimal swelling of right knee joint. No deformity or crepitus felt. No clicking heard with ambulation. Will favor right leg with walking.       Assessment/Plan:     1. Contusion of right knee, subsequent encounter    - REFERRAL TO OCCUPATIONAL MEDICINE    2. Knee strain, right, subsequent encounter    - REFERRAL TO OCCUPATIONAL MEDICINE    May use cool compresses for swelling prn  May utilize RICE method prn  Avoid excessive weight bearing, use ace wrap/knee brace with ambulation  May apply topical analgesics prn  Perform proper body mechanics with lifting, twisting, bending and reaching. Ask for assistance with heavy objects > 25#  Monitor for deformity, " numbness/tingling in toes, decreased ROM- need re-evaluation  MMI/transfer of care- Follow up with [facility] on 4/7/17

## 2017-04-04 NOTE — Clinical Note
"   [facility]   [address] [place] [address]. [address] - [place] [address]  Phone: [phone] - Fax: [phone]        [facility] Progress Report and Disability Certification  Date of Service: 4/4/2017   No Show:  No  Date / Time of Next Visit: 4/7/2017 Transfer to [facility] @ 1:20 PM   Claim Information   Patient Name: Veronica Kirby  Claim Number:     Employer: [employer]  Date of Injury: 3/19/2017     Insurer / TPA: [employer]  ID / SSN:     Occupation: Deli Assistant  Diagnosis: Diagnoses of Contusion of right knee, subsequent encounter and Knee strain, right, subsequent encounter were pertinent to this visit.    Medical Information   Related to Industrial Injury? Yes    Subjective Complaints:  DOI 3/19/17. 5th encounter. Slipped and fell on right bent knee. Negative knee xray on initial visit. States pain is still constant at 2-3 pain level with walking, states no improvement. Taking Ibuprofen. Ice application when she gets home. No topical analgesic. States still \"hears clicking noise\" when walking. States pain increases after \"3 hours\" when walking. States using the RICE method but no ace wrap at work used, states \"might make worse not moving it\".   Objective Findings: A/O x 4. TTP to medial aspect and anterior quadricep muscle. No problems with flexion of right knee joint. Full extension of right knee causes \"pulling feeling\"/discomfort. Minimal swelling of right knee joint. No deformity or crepitus felt. No clicking heard with ambulation. Will favor right leg with walking.   Pre-Existing Condition(s):     Assessment:   Condition Same    Status: Discharged / Care Transfer  Permanent Disability:No    Plan:   Comments:Ibuprofen as needed for pain    Diagnostics:      Comments:       Disability Information   Status: Released to Restricted Duty    From:  4/4/2017  Through: 4/7/2017 Restrictions are: Temporary   Physical Restrictions "   Sitting:    Standing:    Stooping:    Bending:  < or = to 4 hrs/day   Squatting:  < or = to 4 hrs/day Walking:  < or = to 4 hrs/day Climbing:    Pushing:      Pulling:    Other:    Reaching Above Shoulder (L):   Reaching Above Shoulder (R):       Reaching Below Shoulder (L):    Reaching Below Shoulder (R):      Not to exceed Weight Limits   Carrying(hrs):   Weight Limit(lb): < or = to 25 pounds Lifting(hrs):   Weight  Limit(lb): < or = to 25 pounds   Comments: May continue Ibuprofen as needed for pain, may use RICE method, follow up with [facility] due to no improvement at this time    Repetitive Actions   Hands: i.e. Fine Manipulations from Grasping:     Feet: i.e. Operating Foot Controls:     Driving / Operate Machinery:     Physician Name: CARYN Grissom Physician Signature: CASI Pearl e-Signature: Dr. Rojelio Whiting, Medical Director   Clinic Name / Location: [address]  [place] [address] Clinic Phone Number: Dept: [phone]   Appointment Time: 10:00 Am Visit Start Time: 9:41 AM   Check-In Time:  9:35 Am Visit Discharge Time: 10:26 AM   Original-Treating Physician or Chiropractor    Page 2-Insurer/TPA    Page 3-Employer    Page 4-Employee

## 2017-04-04 NOTE — MR AVS SNAPSHOT
"        Veronica Kirby   2017 10:00 AM   Occupational Medicine   MRN: [id number]    Department:  [facility]   Dept Phone:  [phone]    Description:  Female : 1990   Provider:  CARYN Grissom           Reason for Visit     Follow-Up Rt knee injury that occured 2 wks ago from a fall       Allergies as of 2017     Allergen Noted Reactions    Pcn [Penicillins] 2011   Anaphylaxis    Sulfa Drugs 2008   Anaphylaxis    Codeine 2008   Unspecified    Pt states \"I was a childhood allergie\".      You were diagnosed with     Contusion of right knee, subsequent encounter   [496276]       Knee strain, right, subsequent encounter   [[id number]]         Vital Signs     Blood Pressure Pulse Temperature Respirations Height Weight    124/68 mmHg 95 36.6 °C (97.9 °F) 12 1.448 m (4' 9.01\") 64.411 kg (142 lb)    Body Mass Index Oxygen Saturation Smoking Status             30.72 kg/m2 100% Current Every Day Smoker         Basic Information     Date Of Birth Sex Race Ethnicity Preferred Language    1990 Female White Non- English      Your appointments     2017  1:20 PM   Workers Compensation (Long) with Mik Mendez D.O.   [address] [address] [phone]              Health Maintenance        Date Due Completion Dates    PAP SMEAR 2010 ---    IMM DTaP/Tdap/Td Vaccine (2 - Td) 2026            Current Immunizations     Tdap Vaccine 2016  8:42 PM    Tuberculin Skin Test 2014 11:47 AM, 2014 11:30 AM      Below and/or attached are the medications your provider expects you to take. Review all of your home medications and newly ordered medications with your provider and/or pharmacist. Follow medication instructions as directed by your provider and/or pharmacist. Please keep your medication list with you and share with your provider. Update the information when " medications are discontinued, doses are changed, or new medications (including over-the-counter products) are added; and carry medication information at all times in the event of emergency situations     Allergies:  PCN - Anaphylaxis     SULFA DRUGS - Anaphylaxis     CODEINE - Unspecified               Medications  Valid as of: April 04, 2017 - 10:41 AM    Generic Name Brand Name Tablet Size Instructions for use    Ascorbic Acid (Tab) ascorbic acid 500 MG Take 500 mg by mouth every day.        Hydrocodone-Acetaminophen (Tab) NORCO 5-325 MG Take 1-2 Tabs by mouth every four hours as needed.        Iron   Take 2 Tabs by mouth every day.        Melatonin   Take  by mouth.        Multiple Vitamins-Minerals (Tab) THERAGRAN-M  Take 1 Tab by mouth every day.        Naproxen (Tab) NAPROSYN 500 MG Take 500 mg by mouth as needed. Indications: Mild to Moderate Pain        .                 Medicines prescribed today were sent to:     [facility]/PHARMACY #[id number] - [place], [place] - [address] [place] [address] [address]    [address] [place] [address]    Phone: [phone] Fax: [phone]    Open 24 Hours?: No      Medication refill instructions:       If your prescription bottle indicates you have medication refills left, it is not necessary to call your provider’s office. Please contact your pharmacy and they will refill your medication.    If your prescription bottle indicates you do not have any refills left, you may request refills at any time through one of the following ways: The online DocSpera system (except Urgent Care), by calling your provider’s office, or by asking your pharmacy to contact your provider’s office with a refill request. Medication refills are processed only during regular business hours and may not be available until the next business day. Your provider may request additional information or to have a follow-up visit with you prior to refilling your medication.   *Please Note: Medication refills are  assigned a new Rx number when refilled electronically. Your pharmacy may indicate that no refills were authorized even though a new prescription for the same medication is available at the pharmacy. Please request the medicine by name with the pharmacy before contacting your provider for a refill.           Redtree People Access Code: [email or web address]  Expires: 4/25/2017 11:05 AM    Redtree People  A secure, online tool to manage your health information     [employer]’s Redtree People® is a secure, online tool that connects you to your personalized health information from the privacy of your home -- day or night - making it very easy for you to manage your healthcare. Once the activation process is completed, you can even access your medical information using the Redtree People niles, which is available for free in the Apple Niles store or Google Play store.     Redtree People provides the following levels of access (as shown below):   My Chart Features   [facility] Primary Care Doctor [facility]  Specialists [facility]  Urgent  Care [facility]  Primary Care  Doctor   Email your healthcare team securely and privately 24/7 X X X    Manage appointments: schedule your next appointment; view details of past/upcoming appointments X      Request prescription refills. X      View recent personal medical records, including lab and immunizations X X X X   View health record, including health history, allergies, medications X X X X   Read reports about your outpatient visits, procedures, consult and ER notes X X X X   See your discharge summary, which is a recap of your hospital and/or ER visit that includes your diagnosis, lab results, and care plan. X X       How to register for Redtree People:  1. Go to  [email or web address].  2. Click on the Sign Up Now box, which takes you to the New Member Sign Up page. You will need to provide the following information:  a. Enter your Redtree People Access Code exactly as it appears at the top of this page. (You will not need to use this  code after you’ve completed the sign-up process. If you do not sign up before the expiration date, you must request a new code.)   b. Enter your date of birth.   c. Enter your home email address.   d. Click Submit, and follow the next screen’s instructions.  3. Create a Crowdsourced Testing co.t ID. This will be your Crowdsourced Testing co.t login ID and cannot be changed, so think of one that is secure and easy to remember.  4. Create a Crowdsourced Testing co.t password. You can change your password at any time.  5. Enter your Password Reset Question and Answer. This can be used at a later time if you forget your password.   6. Enter your e-mail address. This allows you to receive e-mail notifications when new information is available in SellanApp.  7. Click Sign Up. You can now view your health information.    For assistance activating your SellanApp account, call [phone]        Quit Tobacco Information     Do you want to quit using tobacco?    Quitting tobacco decreases risks of cancer, heart and lung disease, increases life expectancy, improves sense of taste and smell, and increases spending money, among other benefits.    If you are thinking about quitting, we can help.  • [facility] Quit Tobacco Program: [phone]  o Program occurs weekly for four weeks and includes pharmacist consultation on products to support quitting smoking or chewing tobacco. A provider referral is needed for pharmacist consultation.  • Tobacco Users Help Hotline: 1-554-QUIT-NOW (257-2248) or https://[place].quitlogix.org/  o Free, confidential telephone and online coaching for [place] residents. Sessions are designed on a schedule that is convenient for you. Eligible clients receive free nicotine replacement therapy.  • Nationally: www.smokefree.gov  o Information and professional assistance to support both immediate and long-term needs as you become, and remain, a non-smoker. Smokefree.gov allows you to choose the help that best fits your needs.

## 2017-04-07 ENCOUNTER — OCCUPATIONAL MEDICINE (OUTPATIENT)
Dept: OCCUPATIONAL MEDICINE | Facility: CLINIC | Age: 27
End: 2017-04-07
Payer: MEDICARE

## 2017-04-07 VITALS
SYSTOLIC BLOOD PRESSURE: 118 MMHG | HEIGHT: 57 IN | DIASTOLIC BLOOD PRESSURE: 62 MMHG | WEIGHT: 142 LBS | BODY MASS INDEX: 30.63 KG/M2 | HEART RATE: 76 BPM | OXYGEN SATURATION: 98 % | RESPIRATION RATE: 16 BRPM | TEMPERATURE: 98.1 F

## 2017-04-07 DIAGNOSIS — S80.01XD CONTUSION OF RIGHT KNEE, SUBSEQUENT ENCOUNTER: ICD-10-CM

## 2017-04-07 PROCEDURE — 99202 OFFICE O/P NEW SF 15 MIN: CPT | Performed by: PREVENTIVE MEDICINE

## 2017-04-07 ASSESSMENT — PAIN SCALES - GENERAL: PAINLEVEL: 7=MODERATE-SEVERE PAIN

## 2017-04-07 ASSESSMENT — ENCOUNTER SYMPTOMS
TINGLING: 0
FOCAL WEAKNESS: 0
SENSORY CHANGE: 0

## 2017-04-07 NOTE — MR AVS SNAPSHOT
"        Veronica Kirby   2017 1:20 PM   Occupational Medicine   MRN: [id number]    Department:  [facility]   Dept Phone:  [phone]    Description:  Female : 1990   Provider:  Mik Mendez D.O.           Reason for Visit     Work-Related Injury DOI 3/19/17 R knee worse rm 1      Allergies as of 2017     Allergen Noted Reactions    Pcn [Penicillins] 2011   Anaphylaxis    Sulfa Drugs 2008   Anaphylaxis    Codeine 2008   Unspecified    Pt states \"I was a childhood allergie\".      You were diagnosed with     Contusion of right knee, subsequent encounter   [[id number]]         Vital Signs     Blood Pressure Pulse Temperature Respirations Height Weight    118/62 mmHg 76 36.7 °C (98.1 °F) 16 1.448 m (4' 9\") 64.411 kg (142 lb)    Body Mass Index Oxygen Saturation Smoking Status             30.72 kg/m2 98% Current Every Day Smoker         Basic Information     Date Of Birth Sex Race Ethnicity Preferred Language    1990 Female White Non- English      Your appointments     2017  8:40 AM   Workers Compensation with Mik Mendez D.O.   [address] [address] [phone]              Health Maintenance        Date Due Completion Dates    PAP SMEAR 2010 ---    IMM DTaP/Tdap/Td Vaccine (2 - Td) 2026            Current Immunizations     Tdap Vaccine 2016  8:42 PM    Tuberculin Skin Test 2014 11:47 AM, 2014 11:30 AM      Below and/or attached are the medications your provider expects you to take. Review all of your home medications and newly ordered medications with your provider and/or pharmacist. Follow medication instructions as directed by your provider and/or pharmacist. Please keep your medication list with you and share with your provider. Update the information when medications are discontinued, doses are changed, or new medications (including over-the-counter " products) are added; and carry medication information at all times in the event of emergency situations     Allergies:  PCN - Anaphylaxis     SULFA DRUGS - Anaphylaxis     CODEINE - Unspecified               Medications  Valid as of: April 07, 2017 -  4:44 PM    Generic Name Brand Name Tablet Size Instructions for use    Ascorbic Acid (Tab) ascorbic acid 500 MG Take 500 mg by mouth every day.        Hydrocodone-Acetaminophen (Tab) NORCO 5-325 MG Take 1-2 Tabs by mouth every four hours as needed.        Iron   Take 2 Tabs by mouth every day.        Melatonin   Take  by mouth.        Multiple Vitamins-Minerals (Tab) THERAGRAN-M  Take 1 Tab by mouth every day.        Naproxen (Tab) NAPROSYN 500 MG Take 500 mg by mouth as needed. Indications: Mild to Moderate Pain        .                 Medicines prescribed today were sent to:     [facility]/PHARMACY #[id number] - [place], [place] - [address] [place] [address] [address]    [address] [place] [address]    Phone: [phone] Fax: [phone]    Open 24 Hours?: No      Medication refill instructions:       If your prescription bottle indicates you have medication refills left, it is not necessary to call your provider’s office. Please contact your pharmacy and they will refill your medication.    If your prescription bottle indicates you do not have any refills left, you may request refills at any time through one of the following ways: The online BubbleGab system (except Urgent Care), by calling your provider’s office, or by asking your pharmacy to contact your provider’s office with a refill request. Medication refills are processed only during regular business hours and may not be available until the next business day. Your provider may request additional information or to have a follow-up visit with you prior to refilling your medication.   *Please Note: Medication refills are assigned a new Rx number when refilled electronically. Your pharmacy may indicate that no refills were  authorized even though a new prescription for the same medication is available at the pharmacy. Please request the medicine by name with the pharmacy before contacting your provider for a refill.        Referral     A referral request has been sent to our patient care coordination department. Please allow 3-5 business days for us to process this request and contact you either by phone or mail. If you do not hear from us by the 5th business day, please call us at [phone].           iMICROQ Access Code: [email or web address]  Expires: 4/25/2017 11:05 AM    iMICROQ  A secure, online tool to manage your health information     [employer]’s iMICROQ® is a secure, online tool that connects you to your personalized health information from the privacy of your home -- day or night - making it very easy for you to manage your healthcare. Once the activation process is completed, you can even access your medical information using the iMICROQ niles, which is available for free in the Apple Niles store or Google Play store.     iMICROQ provides the following levels of access (as shown below):   My Chart Features   [facility] Primary Care Doctor [facility]  Specialists [facility]  Urgent  Care Non-[facility]  Primary Care  Doctor   Email your healthcare team securely and privately 24/7 X X X    Manage appointments: schedule your next appointment; view details of past/upcoming appointments X      Request prescription refills. X      View recent personal medical records, including lab and immunizations X X X X   View health record, including health history, allergies, medications X X X X   Read reports about your outpatient visits, procedures, consult and ER notes X X X X   See your discharge summary, which is a recap of your hospital and/or ER visit that includes your diagnosis, lab results, and care plan. X X       How to register for iMICROQ:  1. Go to  [email or web address].  2. Click on the Sign Up Now box, which takes you to the New  Member Sign Up page. You will need to provide the following information:  a. Enter your Surround App Access Code exactly as it appears at the top of this page. (You will not need to use this code after you’ve completed the sign-up process. If you do not sign up before the expiration date, you must request a new code.)   b. Enter your date of birth.   c. Enter your home email address.   d. Click Submit, and follow the next screen’s instructions.  3. Create a Biexdiao.comt ID. This will be your Surround App login ID and cannot be changed, so think of one that is secure and easy to remember.  4. Create a Surround App password. You can change your password at any time.  5. Enter your Password Reset Question and Answer. This can be used at a later time if you forget your password.   6. Enter your e-mail address. This allows you to receive e-mail notifications when new information is available in Surround App.  7. Click Sign Up. You can now view your health information.    For assistance activating your Surround App account, call [phone]        Quit Tobacco Information     Do you want to quit using tobacco?    Quitting tobacco decreases risks of cancer, heart and lung disease, increases life expectancy, improves sense of taste and smell, and increases spending money, among other benefits.    If you are thinking about quitting, we can help.  • [facility] Quit Tobacco Program: [phone]  o Program occurs weekly for four weeks and includes pharmacist consultation on products to support quitting smoking or chewing tobacco. A provider referral is needed for pharmacist consultation.  • Tobacco Users Help Hotline: 9-366-QUIT-NOW (231-5021) or https://[place].quitlogix.org/  o Free, confidential telephone and online coaching for [place] residents. Sessions are designed on a schedule that is convenient for you. Eligible clients receive free nicotine replacement therapy.  • Nationally: www.smokefree.gov  o Information and professional assistance to support  both immediate and long-term needs as you become, and remain, a non-smoker. Smokefree.gov allows you to choose the help that best fits your needs.

## 2017-04-07 NOTE — Clinical Note
[address],   [address] [place] [address]  Phone: [phone] - Fax: [phone]        [facility] Progress Report and Disability Certification  Date of Service: 4/7/2017   No Show:  No  Date / Time of Next Visit: 4/17/2017@8:40AM    Claim Information   Patient Name: Veronica Kirby  Claim Number:     Employer: [employer]  Date of Injury: 3/19/2017     Insurer / TPA: [employer] Workers [employer]  ID / SSN:     Occupation: Deli Assistant  Diagnosis: The encounter diagnosis was Contusion of right knee, subsequent encounter.    Medical Information   Related to Industrial Injury? Yes    Subjective Complaints:  DOI 3/19/2017: Slipped and fell on right bent knee. Negative knee xray on initial visit. Patient states that initially after about a week the pain improved. And was back to work last visit however after the physical exam last visit she noted increased pain. She states that the pain is more on the medial aspect and anterior aspect. Pain is moderate and worse with extended standing or walking. She takes Aleve once a day for relief. She's been using Ace wrap and knee brace. Denies any numbness or tingling.Occasional clicking or colon sensation   Objective Findings: Right Knee: Mild swelling prepatellar area. Tenderness to palpation prepatellar region, medial joint line and patella. Slightly decreased flexion. Anterior/posterior drawer test negative. The laxative first about stress. Ford's indeterminate.   Pre-Existing Condition(s):     Assessment:   Condition Same    Status: Additional Care Required  Permanent Disability:No    Plan:      Diagnostics:      Comments:  Continue Aleve, up to 2 tabs twice daily  Continue knee brace and Ace wrap, may use ice as needed  Referral physical therapy  Continue restricted duty  Follow-up 10 days    Disability Information   Status: Released to Restricted Duty    From:  4/7/2017  Through:  2017 Restrictions are:     Physical Restrictions   Sitting:    Standing:  < or = to 4 hrs/day Stooping:    Bending:      Squattin hrs/day Walking:  < or = to 4 hrs/day Climbin hrs/day Pushing:      Pulling:    Other:    Reaching Above Shoulder (L):   Reaching Above Shoulder (R):       Reaching Below Shoulder (L):    Reaching Below Shoulder (R):      Not to exceed Weight Limits   Carrying(hrs):   Weight Limit(lb):   Lifting(hrs):   Weight  Limit(lb):     Comments: Seated work 50% of time.    Repetitive Actions   Hands: i.e. Fine Manipulations from Grasping:     Feet: i.e. Operating Foot Controls:     Driving / Operate Machinery:     Physician Name: Mik Mendez D.O. Physician Signature: MIK Penaloza D.O. e-Signature: Dr. Rojelio Whiting, Medical Director   Clinic Name / Location: [address],   [address] [place] [address] Clinic Phone Number: Dept: [phone]   Appointment Time: 1:20 Pm Visit Start Time: 1:13 PM   Check-In Time:  1:06 Pm Visit Discharge Time:  @2:03PM    Original-Treating Physician or Chiropractor    Page 2-Insurer/TPA    Page 3-Employer    Page 4-Employee

## 2017-04-07 NOTE — PROGRESS NOTES
"Subjective:      Veronica Kirby is a 27 y.o. female who presents with Work-Related Injury      DOI 3/19/2017: Slipped and fell on right bent knee. Negative knee xray on initial visit. Patient states that initially after about a week the pain improved. And was back to work last visit however after the physical exam last visit she noted increased pain. She states that the pain is more on the medial aspect and anterior aspect. Pain is moderate and worse with extended standing or walking. She takes Aleve once a day for relief. She's been using Ace wrap and knee brace. Denies any numbness or tingling.Occasional clicking or colon sensation     HPI    Review of Systems   Skin: Negative for itching and rash.   Neurological: Negative for tingling, sensory change and focal weakness.     PMH:  has no past medical history on file.  MEDS:   Current outpatient prescriptions:   •  ascorbic acid (ASCORBIC ACID) 500 MG Tab, Take 500 mg by mouth every day., Disp: , Rfl:   •  MELATONIN PO, Take  by mouth., Disp: , Rfl:   •  hydrocodone-acetaminophen (NORCO) 5-325 MG Tab per tablet, Take 1-2 Tabs by mouth every four hours as needed., Disp: 14 Tab, Rfl: 0  •  therapeutic multivitamin-minerals (THERAGRAN-M) Tab, Take 1 Tab by mouth every day., Disp: , Rfl:   •  IRON PO, Take 2 Tabs by mouth every day., Disp: , Rfl:   •  naproxen (NAPROSYN) 500 MG Tab, Take 500 mg by mouth as needed. Indications: Mild to Moderate Pain, Disp: , Rfl:   ALLERGIES:   Allergies   Allergen Reactions   • Pcn [Penicillins] Anaphylaxis   • Sulfa Drugs Anaphylaxis   • Codeine Unspecified     Pt states \"I was a childhood allergie\".     SURGHX: No past surgical history on file.  SOCHX:  reports that she has been smoking.  She does not have any smokeless tobacco history on file. She reports that she drinks alcohol. She reports that she does not use illicit drugs.  .         Objective:     /62 mmHg  Pulse 76  Temp(Src) 36.7 °C (98.1 °F)  Resp 16  Ht 1.448 m (4' " "9\")  Wt 64.411 kg (142 lb)  BMI 30.72 kg/m2  SpO2 98%     Physical Exam   Constitutional: She is oriented to person, place, and time. She appears well-developed and well-nourished.   Cardiovascular: Normal rate.    Pulmonary/Chest: Effort normal. No respiratory distress.   Neurological: She is alert and oriented to person, place, and time.   Skin: Skin is warm and dry.   Psychiatric: She has a normal mood and affect. Her behavior is normal.   Vitals reviewed.      Right Knee: Mild swelling prepatellar area. Tenderness to palpation prepatellar region, medial joint line and patella. Slightly decreased flexion. Anterior/posterior drawer test negative. The laxative first about stress. Ford's indeterminate.       Assessment/Plan:     1. Contusion of right knee, subsequent encounter  - REFERRAL TO PHYSICAL THERAPY Reason for Therapy: Eval/Treat/Report    Continue Aleve, up to 2 tabs twice daily  Continue knee brace and Ace wrap, may use ice as needed  Referral physical therapy  Continue restricted duty  Follow-up 10 days    "

## 2017-04-14 ENCOUNTER — OCCUPATIONAL MEDICINE (OUTPATIENT)
Dept: OCCUPATIONAL MEDICINE | Facility: CLINIC | Age: 27
End: 2017-04-14
Payer: MEDICARE

## 2017-04-14 VITALS
WEIGHT: 142 LBS | HEART RATE: 82 BPM | DIASTOLIC BLOOD PRESSURE: 60 MMHG | TEMPERATURE: 97.3 F | SYSTOLIC BLOOD PRESSURE: 98 MMHG | RESPIRATION RATE: 12 BRPM | OXYGEN SATURATION: 100 % | BODY MASS INDEX: 30.63 KG/M2 | HEIGHT: 57 IN

## 2017-04-14 DIAGNOSIS — S80.01XD CONTUSION OF RIGHT KNEE, SUBSEQUENT ENCOUNTER: ICD-10-CM

## 2017-04-14 PROCEDURE — 99213 OFFICE O/P EST LOW 20 MIN: CPT | Performed by: PREVENTIVE MEDICINE

## 2017-04-14 ASSESSMENT — ENCOUNTER SYMPTOMS
TINGLING: 0
SENSORY CHANGE: 0
FOCAL WEAKNESS: 0

## 2017-04-14 ASSESSMENT — PAIN SCALES - GENERAL: PAINLEVEL: 4=SLIGHT-MODERATE PAIN

## 2017-04-14 NOTE — MR AVS SNAPSHOT
"        Veronica Kirby   2017 8:40 AM   Occupational Medicine   MRN: [id number]    Department:  [facility]   Dept Phone:  [phone]    Description:  Female : 1990   Provider:  Mik Mendez D.O.           Reason for Visit     Follow-Up DOI 3/19/17- WC [facility] R Knee- slowly getting better      Allergies as of 2017     Allergen Noted Reactions    Pcn [Penicillins] 2011   Anaphylaxis    Sulfa Drugs 2008   Anaphylaxis    Codeine 2008   Unspecified    Pt states \"I was a childhood allergie\".      You were diagnosed with     Contusion of right knee, subsequent encounter   [007126]         Vital Signs     Blood Pressure Pulse Temperature Respirations Height Weight    98/60 mmHg 82 36.3 °C (97.3 °F) 12 1.448 m (4' 9.01\") 64.411 kg (142 lb)    Body Mass Index Oxygen Saturation Breastfeeding? Smoking Status          30.72 kg/m2 100% No Current Every Day Smoker        Basic Information     Date Of Birth Sex Race Ethnicity Preferred Language    1990 Female White Non- English      Your appointments     2017  3:20 PM   Workers Compensation with Mik Mendez D.O.   [address] [id number] [phone]              Health Maintenance        Date Due Completion Dates    PAP SMEAR 2010 ---    IMM DTaP/Tdap/Td Vaccine (2 - Td) 2026            Current Immunizations     Tdap Vaccine 2016  8:42 PM    Tuberculin Skin Test 2014 11:47 AM, 2014 11:30 AM      Below and/or attached are the medications your provider expects you to take. Review all of your home medications and newly ordered medications with your provider and/or pharmacist. Follow medication instructions as directed by your provider and/or pharmacist. Please keep your medication list with you and share with your provider. Update the information when medications are discontinued, doses are changed, or new medications " (including over-the-counter products) are added; and carry medication information at all times in the event of emergency situations     Allergies:  PCN - Anaphylaxis     SULFA DRUGS - Anaphylaxis     CODEINE - Unspecified               Medications  Valid as of: April 14, 2017 - 11:37 AM    Generic Name Brand Name Tablet Size Instructions for use    Ascorbic Acid (Tab) ascorbic acid 500 MG Take 500 mg by mouth every day.        Hydrocodone-Acetaminophen (Tab) NORCO 5-325 MG Take 1-2 Tabs by mouth every four hours as needed.        Iron   Take 2 Tabs by mouth every day.        Melatonin   Take  by mouth.        Multiple Vitamins-Minerals (Tab) THERAGRAN-M  Take 1 Tab by mouth every day.        Naproxen (Tab) NAPROSYN 500 MG Take 500 mg by mouth as needed. Indications: Mild to Moderate Pain        .                 Medicines prescribed today were sent to:     [id number] - [place], [place] - [address] [address]    [address] [place] [address]    Phone: [phone] Fax: [phone]    Open 24 Hours?: No      Medication refill instructions:       If your prescription bottle indicates you have medication refills left, it is not necessary to call your provider’s office. Please contact your pharmacy and they will refill your medication.    If your prescription bottle indicates you do not have any refills left, you may request refills at any time through one of the following ways: The online [employer] system (except Urgent Care), by calling your provider’s office, or by asking your pharmacy to contact your provider’s office with a refill request. Medication refills are processed only during regular business hours and may not be available until the next business day. Your provider may request additional information or to have a follow-up visit with you prior to refilling your medication.   *Please Note: Medication refills are assigned a new Rx number when refilled electronically. Your pharmacy may  indicate that no refills were authorized even though a new prescription for the same medication is available at the pharmacy. Please request the medicine by name with the pharmacy before contacting your provider for a refill.           [email or web address].com Access Code: [email or web address]  Expires: 4/25/2017 11:05 AM    Traddr.com  A secure, online tool to manage your health information     [employer]’s Traddr.com® is a secure, online tool that connects you to your personalized health information from the privacy of your home -- day or night - making it very easy for you to manage your healthcare. Once the activation process is completed, you can even access your medical information using the Traddr.com niles, which is available for free in the Apple Niles store or Google Play store.     Traddr.com provides the following levels of access (as shown below):   My Chart Features   [facility]  Specialists [facility]  Urgent  Care [facility]  Primary Care  Doctor   Email your healthcare team securely and privately 24/7 X X X    Manage appointments: schedule your next appointment; view details of past/upcoming appointments X      Request prescription refills. X      View recent personal medical records, including lab and immunizations X X X X   View health record, including health history, allergies, medications X X X X   Read reports about your outpatient visits, procedures, consult and ER notes X X X X   See your discharge summary, which is a recap of your hospital and/or ER visit that includes your diagnosis, lab results, and care plan. X X       How to register for [email or web address].com:  1. Go to  [email or web address].  2. Click on the Sign Up Now box, which takes you to the New Member Sign Up page. You will need to provide the following information:  a. Enter your Traddr.com Access Code exactly as it appears at the top of this page. (You will not need to use this code after you’ve completed the sign-up process. If you do not sign up  before the expiration date, you must request a new code.)   b. Enter your date of birth.   c. Enter your home email address.   d. Click Submit, and follow the next screen’s instructions.  3. Create a RawFlow ID. This will be your RawFlow login ID and cannot be changed, so think of one that is secure and easy to remember.  4. Create a FusionOpst password. You can change your password at any time.  5. Enter your Password Reset Question and Answer. This can be used at a later time if you forget your password.   6. Enter your e-mail address. This allows you to receive e-mail notifications when new information is available in RawFlow.  7. Click Sign Up. You can now view your health information.    For assistance activating your RawFlow account, call [phone]        Quit Tobacco Information     Do you want to quit using tobacco?    Quitting tobacco decreases risks of cancer, heart and lung disease, increases life expectancy, improves sense of taste and smell, and increases spending money, among other benefits.    If you are thinking about quitting, we can help.  • [facility] Quit Tobacco Program: 810.962.1284  o Program occurs weekly for four weeks and includes pharmacist consultation on products to support quitting smoking or chewing tobacco. A provider referral is needed for pharmacist consultation.  • Tobacco Users Help Hotline: 1-136-QUIT-NOW (201-7606) or https://[place].quitlogix.org/  o Free, confidential telephone and online coaching for [place] residents. Sessions are designed on a schedule that is convenient for you. Eligible clients receive free nicotine replacement therapy.  • Nationally: www.smokefree.gov  o Information and professional assistance to support both immediate and long-term needs as you become, and remain, a non-smoker. Smokefree.gov allows you to choose the help that best fits your needs.

## 2017-04-14 NOTE — Clinical Note
[address],   [address] [address]  Phone: [phone] - Fax: [phone]        [facility] Progress Report and Disability Certification  Date of Service: 4/14/2017   No Show:  No  Date / Time of Next Visit: 4/27/2017 @ 3:20 PM   Claim Information   Patient Name: Veronica Kirby  Claim Number:     Employer: [employer]  Date of Injury: 3/19/2017     Insurer / TPA: [employer]  ID / SSN:     Occupation: Deli Assistant  Diagnosis: The encounter diagnosis was Contusion of right knee, subsequent encounter.    Medical Information   Related to Industrial Injury? Yes    Subjective Complaints:  DOI 3/19/2017: Slipped and fell on right bent knee. Negative knee xray on initial visit. Patient states that the right knee pain is not improved. She states the pain continues to be more on the medial and anterior aspect of the knee. Pain is sharp and throbbing nonradiating. Denies any numbness or tingling. Pain worse with prolonged standing and walking. She states that she went back to work for 4 hour shifts which is exacerbated her pain somewhat but has been tolerable. Pain relieved somewhat with naproxen. Physical therapy has yet to be approved.   Objective Findings: Right Knee: No swelling. Tenderness to palpation prepatellar region, medial joint line and patella. Slightly decreased flexion. Anterior/posterior drawer test negative. No laxity with varus or valgus stress. Ford's indeterminate. Antalgic gait   Pre-Existing Condition(s):     Assessment:   Condition Same    Status: Additional Care Required  Permanent Disability:No    Plan:      Diagnostics:      Comments:  Begin PT when approved  Continue Naproxen as needed. Continue knee brace as needed  Continue restricted duty  Follow up 2 weeks    Disability Information   Status: Released to Restricted Duty    From:  4/14/2017  Through: 4/27/2017 Restrictions are: Temporary    Physical Restrictions   Sitting:    Standing:  < or = to 4 hrs/day Stooping:    Bending:      Squattin hrs/day Walking:  < or = to 4 hrs/day Climbin hrs/day Pushing:      Pulling:    Other:    Reaching Above Shoulder (L):   Reaching Above Shoulder (R):       Reaching Below Shoulder (L):    Reaching Below Shoulder (R):      Not to exceed Weight Limits   Carrying(hrs):   Weight Limit(lb):   Lifting(hrs):   Weight  Limit(lb):     Comments:      Repetitive Actions   Hands: i.e. Fine Manipulations from Grasping:     Feet: i.e. Operating Foot Controls:     Driving / Operate Machinery:     Physician Name: Mik Mendez D.O. Physician Signature: MIK Penaloza D.O. e-Signature: Dr. Rojelio Whiting, Medical Director   Clinic Name / Location: [address],   [address] [place] [address] Clinic Phone Number: Dept: [phone]   Appointment Time: 8:40 Am Visit Start Time: 8:22 AM   Check-In Time:  8:17 Am Visit Discharge Time: 9:15 AM   Original-Treating Physician or Chiropractor    Page 2-Insurer/TPA    Page 3-Employer    Page 4-Employee

## 2017-04-14 NOTE — PROGRESS NOTES
"Subjective:      Veronica Kirby is a 27 y.o. female who presents with Follow-Up      DOI 3/19/2017: Slipped and fell on right bent knee. Negative knee xray on initial visit. Patient states that the right knee pain is not improved. She states the pain continues to be more on the medial and anterior aspect of the knee. Pain is sharp and throbbing nonradiating. Denies any numbness or tingling. Pain worse with prolonged standing and walking. She states that she went back to work for 4 hour shifts which is exacerbated her pain somewhat but has been tolerable. Pain relieved somewhat with naproxen. Physical therapy has yet to be approved.     HPI    Review of Systems   Musculoskeletal: Positive for joint pain.   Skin: Negative for rash.   Neurological: Negative for tingling, sensory change and focal weakness.     PMH:  has no past medical history on file.  MEDS:   Current outpatient prescriptions:   •  ascorbic acid (ASCORBIC ACID) 500 MG Tab, Take 500 mg by mouth every day., Disp: , Rfl:   •  MELATONIN PO, Take  by mouth., Disp: , Rfl:   •  hydrocodone-acetaminophen (NORCO) 5-325 MG Tab per tablet, Take 1-2 Tabs by mouth every four hours as needed., Disp: 14 Tab, Rfl: 0  •  therapeutic multivitamin-minerals (THERAGRAN-M) Tab, Take 1 Tab by mouth every day., Disp: , Rfl:   •  IRON PO, Take 2 Tabs by mouth every day., Disp: , Rfl:   •  naproxen (NAPROSYN) 500 MG Tab, Take 500 mg by mouth as needed. Indications: Mild to Moderate Pain, Disp: , Rfl:   ALLERGIES:   Allergies   Allergen Reactions   • Pcn [Penicillins] Anaphylaxis   • Sulfa Drugs Anaphylaxis   • Codeine Unspecified     Pt states \"I was a childhood allergie\".     SURGHX: No past surgical history on file.  SOCHX:  reports that she has been smoking.  She does not have any smokeless tobacco history on file. She reports that she drinks alcohol. She reports that she does not use illicit drugs.       Objective:     BP 98/60 mmHg  Pulse 82  Temp(Src) 36.3 °C (97.3 °F)  " "Resp 12  Ht 1.448 m (4' 9.01\")  Wt 64.411 kg (142 lb)  BMI 30.72 kg/m2  SpO2 100%  Breastfeeding? No     Physical Exam   Constitutional: She is oriented to person, place, and time. She appears well-developed and well-nourished.   Neurological: She is alert and oriented to person, place, and time.   Skin: Skin is warm and dry.       Right Knee: No swelling. Tenderness to palpation prepatellar region, medial joint line and patella. Slightly decreased flexion. Anterior/posterior drawer test negative. No laxity with varus or valgus stress. Ford's indeterminate. Antalgic gait       Assessment/Plan:     1. Contusion of right knee, subsequent encounter  Begin PT when approved   Continue Naproxen as needed. Continue knee brace as needed   Continue restricted duty   Follow up 2 weeks          "

## 2017-04-26 ENCOUNTER — TELEPHONE (OUTPATIENT)
Dept: OCCUPATIONAL MEDICINE | Facility: CLINIC | Age: 27
End: 2017-04-26

## 2017-04-26 NOTE — TELEPHONE ENCOUNTER
Patients WC referral has been available since 04-18-17. I contacted [address] [place] [address] PT to see if patient had scheduled and she has not, nor have they called to schedule her as they are working on referral from March. I contacted patient and provided her with the contact information so she can call and set up her PT, she took the number and should be getting that scheduled.

## 2017-04-27 ENCOUNTER — OCCUPATIONAL MEDICINE (OUTPATIENT)
Dept: OCCUPATIONAL MEDICINE | Facility: CLINIC | Age: 27
End: 2017-04-27
Payer: MEDICARE

## 2017-04-27 VITALS
DIASTOLIC BLOOD PRESSURE: 70 MMHG | HEIGHT: 58 IN | SYSTOLIC BLOOD PRESSURE: 126 MMHG | TEMPERATURE: 97.9 F | HEART RATE: 88 BPM | RESPIRATION RATE: 16 BRPM | WEIGHT: 143 LBS | BODY MASS INDEX: 30.02 KG/M2 | OXYGEN SATURATION: 99 %

## 2017-04-27 DIAGNOSIS — S80.01XD CONTUSION OF RIGHT KNEE, SUBSEQUENT ENCOUNTER: ICD-10-CM

## 2017-04-27 PROCEDURE — 99213 OFFICE O/P EST LOW 20 MIN: CPT | Performed by: PREVENTIVE MEDICINE

## 2017-04-27 ASSESSMENT — ENCOUNTER SYMPTOMS
SENSORY CHANGE: 0
TINGLING: 0

## 2017-04-27 NOTE — Clinical Note
[address],   [address] [address]  Phone: [phone] - Fax: [phone]        [facility] Progress Report and Disability Certification  Date of Service: 2017   No Show:  No  Date / Time of Next Visit: 2017   Claim Information   Patient Name: Veronica Kirby  Claim Number:     Employer: [employer]  Date of Injury: 3/19/2017     Insurer / TPA: [employer]  ID / SSN:     Occupation: Deli Assistant  Diagnosis: The encounter diagnosis was Contusion of right knee, subsequent encounter.    Medical Information   Related to Industrial Injury?   Comments:indeterminate    Subjective Complaints:  DOI 3/19/2017: Slipped and fell on right bent knee. Negative knee xray on initial visit. Patient states that the right knee pain is not improved. Pain with walking, standing, squatting. Pain is constant, throbbing and sharp. At times knee feels unstable. Takes naproxen occasionally for relief. Denies numbness, tingling.   Objective Findings: Right Knee: No swelling. Diffuse tenderness to palpation prepatellar region, medial joint line and patella. Anterior/posterior drawer test negative. No laxity with varus or valgus stress. Ford's indeterminate. Antalgic gait   Pre-Existing Condition(s):     Assessment:   Condition Same    Status: Additional Care Required  Permanent Disability:No    Plan:      Diagnostics:      Comments:  MRI right knee ordered  Begin PT  Naproxen as needed  Follow up 2.5 weeks    Disability Information   Status: Released to Restricted Duty    From:  2017  Through: 2017 Restrictions are: Temporary   Physical Restrictions   Sitting:    Standing:  < or = to 4 hrs/day Stooping:    Bending:      Squattin hrs/day Walking:  < or = to 4 hrs/day Climbin hrs/day Pushing:      Pulling:    Other:    Reaching Above Shoulder (L):   Reaching Above Shoulder (R):       Reaching Below Shoulder  (L):    Reaching Below Shoulder (R):      Not to exceed Weight Limits   Carrying(hrs):   Weight Limit(lb):   Lifting(hrs):   Weight  Limit(lb):     Comments:      Repetitive Actions   Hands: i.e. Fine Manipulations from Grasping:     Feet: i.e. Operating Foot Controls:     Driving / Operate Machinery:     Physician Name: Mik Mendez D.O. Physician Signature: MIK Penaloza D.O. e-Signature: Dr. Rojelio Whiting, Medical Director   Clinic Name / Location: [address],   [address] [place] [id number] Clinic Phone Number: Dept: [phone]   Appointment Time: 3:20 Pm Visit Start Time: 3:20 PM   Check-In Time:  3:14 Pm Visit Discharge Time:  3:37 PM   Original-Treating Physician or Chiropractor    Page 2-Insurer/TPA    Page 3-Employer    Page 4-Employee

## 2017-04-27 NOTE — MR AVS SNAPSHOT
"        Veronica Kirby   2017 3:20 PM   Occupational Medicine   MRN: [id number]    Department:  [facility]   Dept Phone:  [phone]    Description:  Female : 1990   Provider:  Mik Mendez D.O.           Reason for Visit     Other WC [facility] DOI 3/19/17 (R) knee, same, room 3      Allergies as of 2017     Allergen Noted Reactions    Pcn [Penicillins] 2011   Anaphylaxis    Sulfa Drugs 2008   Anaphylaxis    Codeine 2008   Unspecified    Pt states \"I was a childhood allergie\".      You were diagnosed with     Contusion of right knee, subsequent encounter   [292235]         Vital Signs     Blood Pressure Pulse Temperature Respirations Height Weight    126/70 mmHg 88 36.6 °C (97.9 °F) 16 1.473 m (4' 10\") 64.864 kg (143 lb)    Body Mass Index Oxygen Saturation Smoking Status             29.89 kg/m2 99% Current Every Day Smoker         Basic Information     Date Of Birth Sex Race Ethnicity Preferred Language    1990 Female White Non- English      Your appointments     2017 12:30 PM   PT New Evaluation 60 Minutes with MICKY Manzo Physical Therapy [facility] ([address])    [address] [place] [id number] [phone]           Please bring Photo ID, Insurance Cards, list of all Medication and copies of any legal documents (such as Living Will, Power of ) If speaking a language besides English please bring an adult . Please arrive 30 minutes prior for check in and registration.            May 02, 2017  1:15 PM   PT Follow Up 30 Minutes with MICKY Manzo Physical Therapy [facility] ([address])    [address] [id number] [phone]            May 05, 2017 11:45 AM   PT Follow Up 30 Minutes with MICKY Manzo Physical Therapy [facility] ([address] [place] [address])    [address] [address]   [phone]            May 09, 2017  " 1:15 PM   [facility] ([address])    [address] [facility]  [address]  [place] [address]   [phone]            May 12, 2017  1:15 PM   [facility] ([address] [place] [address])    [address] [facility]  [address]  [place] [address]   [phone]            May 16, 2017 10:40 AM   Workers Compensation with Mik Mendez D.O.   [facility])    [address] [place] [address]  [address]  [place] [address]   [phone]            May 16, 2017  1:15 PM   PT Follow Up [facility] ([address] [place] [address])    [address] [facility]  [address]  [place] [address]   [phone]              Health Maintenance        Date Due Completion Dates    PAP SMEAR 1/11/2010 ---    IMM DTaP/Tdap/Td Vaccine (2 - Td) 12/21/2026 12/21/2016            Current Immunizations     Tdap Vaccine 12/21/2016  8:42 PM    Tuberculin Skin Test 5/6/2014 11:47 AM, 4/29/2014 11:30 AM      Below and/or attached are the medications your provider expects you to take. Review all of your home medications and newly ordered medications with your provider and/or pharmacist. Follow medication instructions as directed by your provider and/or pharmacist. Please keep your medication list with you and share with your provider. Update the information when medications are discontinued, doses are changed, or new medications (including over-the-counter products) are added; and carry medication information at all times in the event of emergency situations     Allergies:  PCN - Anaphylaxis     SULFA DRUGS - Anaphylaxis     CODEINE - Unspecified               Medications  Valid as of: April 27, 2017 -  4:35 PM    Generic Name Brand Name Tablet Size Instructions for use    Ascorbic Acid (Tab) ascorbic acid 500 MG Take 500 mg by mouth every day.         Hydrocodone-Acetaminophen (Tab) NORCO 5-325 MG Take 1-2 Tabs by mouth every four hours as needed.        Iron   Take 2 Tabs by mouth every day.        Melatonin   Take  by mouth.        Multiple Vitamins-Minerals (Tab) THERAGRAN-M  Take 1 Tab by mouth every day.        Naproxen (Tab) NAPROSYN 500 MG Take 500 mg by mouth as needed. Indications: Mild to Moderate Pain        .                 Medicines prescribed today were sent to:     [facility]/PHARMACY #[id number] - [place], [place] - [address] [facility] [address]    [address] [facility] [place] [address]    Phone: [phone] Fax: [phone]    Open 24 Hours?: No      Medication refill instructions:       If your prescription bottle indicates you have medication refills left, it is not necessary to call your provider’s office. Please contact your pharmacy and they will refill your medication.    If your prescription bottle indicates you do not have any refills left, you may request refills at any time through one of the following ways: The online [employer] system (except Urgent Care), by calling your provider’s office, or by asking your pharmacy to contact your provider’s office with a refill request. Medication refills are processed only during regular business hours and may not be available until the next business day. Your provider may request additional information or to have a follow-up visit with you prior to refilling your medication.   *Please Note: Medication refills are assigned a new Rx number when refilled electronically. Your pharmacy may indicate that no refills were authorized even though a new prescription for the same medication is available at the pharmacy. Please request the medicine by name with the pharmacy before contacting your provider for a refill.        Your To Do List     Future Labs/Procedures Complete By Expires    MR-KNEE-W/O RIGHT  As directed 4/27/2018      Referral     A referral request has been sent to our patient care coordination  department. Please allow 3-5 business days for us to process this request and contact you either by phone or mail. If you do not hear from us by the 5th business day, please call us at [phone].           Poppin Access Code: [email or web address]  Expires: 5/24/2017  1:30 PM    Poppin  A secure, online tool to manage your health information     [employer]’s Poppin® is a secure, online tool that connects you to your personalized health information from the privacy of your home -- day or night - making it very easy for you to manage your healthcare. Once the activation process is completed, you can even access your medical information using the Poppin niles, which is available for free in the Apple Niles store or Google Play store.     Poppin provides the following levels of access (as shown below):   My Chart Features   [facility] Doctor [facility] [place] [facility]  Specialists [facility] [place] [facility]  Urgent  Care [facility]  Primary Care  Doctor   Email your healthcare team securely and privately 24/7 X X X    Manage appointments: schedule your next appointment; view details of past/upcoming appointments X      Request prescription refills. X      View recent personal medical records, including lab and immunizations X X X X   View health record, including health history, allergies, medications X X X X   Read reports about your outpatient visits, procedures, consult and ER notes X X X X   See your discharge summary, which is a recap of your hospital and/or ER visit that includes your diagnosis, lab results, and care plan. X X       How to register for Poppin:  1. Go to  [email or web address].  2. Click on the Sign Up Now box, which takes you to the New Member Sign Up page. You will need to provide the following information:  a. Enter your Poppin Access Code exactly as it appears at the top of this page. (You will not need to use this code after you’ve completed the sign-up process. If you do not sign up before the expiration date,  you must request a new code.)   b. Enter your date of birth.   c. Enter your home email address.   d. Click Submit, and follow the next screen’s instructions.  3. Create a Cinario ID. This will be your Cinario login ID and cannot be changed, so think of one that is secure and easy to remember.  4. Create a Cars[employer] password. You can change your password at any time.  5. Enter your Password Reset Question and Answer. This can be used at a later time if you forget your password.   6. Enter your e-mail address. This allows you to receive e-mail notifications when new information is available in Cinario.  7. Click Sign Up. You can now view your health information.    For assistance activating your Cinario account, call [phone]        Quit Tobacco Information     Do you want to quit using tobacco?    Quitting tobacco decreases risks of cancer, heart and lung disease, increases life expectancy, improves sense of taste and smell, and increases spending money, among other benefits.    If you are thinking about quitting, we can help.  • [facility] Quit Tobacco Program: 412.578.6613  o Program occurs weekly for four weeks and includes pharmacist consultation on products to support quitting smoking or chewing tobacco. A provider referral is needed for pharmacist consultation.  • Tobacco Users Help Hotline: 4-087-QUITNOW (099-1719) or https://[place].quitlogix.org/  o Free, confidential telephone and online coaching for [place] residents. Sessions are designed on a schedule that is convenient for you. Eligible clients receive free nicotine replacement therapy.  • Nationally: www.smokefree.gov  o Information and professional assistance to support both immediate and long-term needs as you become, and remain, a non-smoker. Smokefree.gov allows you to choose the help that best fits your needs.

## 2017-04-27 NOTE — PROGRESS NOTES
"Subjective:      Veronica Kirby is a 27 y.o. female who presents with Other      DOI 3/19/2017: Slipped and fell on right bent knee. Negative knee xray on initial visit. Patient states that the right knee pain is not improved. Pain with walking, standing, squatting. Pain is constant, throbbing and sharp. At times knee feels unstable. Takes naproxen occasionally for relief. Denies numbness, tingling.     Other  Pertinent negatives include no rash.       Review of Systems   Skin: Negative for rash.   Neurological: Negative for tingling and sensory change.     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         Objective:     /70 mmHg  Pulse 88  Temp(Src) 36.6 °C (97.9 °F)  Resp 16  Ht 1.473 m (4' 10\")  Wt 64.864 kg (143 lb)  BMI 29.89 kg/m2  SpO2 99%     Physical Exam   Constitutional: She is oriented to person, place, and time. She appears well-developed and well-nourished.   Neck: Normal range of motion. Neck supple.   Cardiovascular: Normal rate.    Pulmonary/Chest: Effort normal. No respiratory distress.   Neurological: She is alert and oriented to person, place, and time.   Skin: Skin is warm and dry.   Psychiatric: She has a normal mood and affect. Judgment normal.   Vitals reviewed.      Right Knee: No swelling. Diffuse tenderness to palpation prepatellar region, medial joint line and patella. Anterior/posterior drawer test negative. No laxity with varus or valgus stress. Ford's indeterminate. Antalgic gait       Assessment/Plan:     1. Contusion of right knee, subsequent encounter  - REFERRAL TO RADIOLOGY  - MR-KNEE-W/O RIGHT; Future    MRI right knee ordered   Begin PT   Naproxen as needed   Follow up 2.5 weeks      "

## 2017-04-28 ENCOUNTER — HOSPITAL ENCOUNTER (OUTPATIENT)
Dept: PHYSICAL THERAPY | Facility: REHABILITATION | Age: 27
End: 2017-04-28
Attending: PREVENTIVE MEDICINE
Payer: MEDICARE

## 2017-04-28 PROCEDURE — 97161 PT EVAL LOW COMPLEX 20 MIN: CPT

## 2017-04-28 PROCEDURE — 97014 ELECTRIC STIMULATION THERAPY: CPT

## 2017-05-02 ENCOUNTER — HOSPITAL ENCOUNTER (OUTPATIENT)
Dept: PHYSICAL THERAPY | Facility: REHABILITATION | Age: 27
End: 2017-05-02
Attending: PREVENTIVE MEDICINE
Payer: MEDICARE

## 2017-05-02 PROCEDURE — 97014 ELECTRIC STIMULATION THERAPY: CPT

## 2017-05-02 PROCEDURE — 97110 THERAPEUTIC EXERCISES: CPT

## 2017-05-05 ENCOUNTER — HOSPITAL ENCOUNTER (OUTPATIENT)
Dept: PHYSICAL THERAPY | Facility: REHABILITATION | Age: 27
End: 2017-05-05
Attending: PREVENTIVE MEDICINE
Payer: MEDICARE

## 2017-05-05 PROCEDURE — 97110 THERAPEUTIC EXERCISES: CPT

## 2017-05-05 PROCEDURE — 97014 ELECTRIC STIMULATION THERAPY: CPT

## 2017-05-10 ENCOUNTER — HOSPITAL ENCOUNTER (OUTPATIENT)
Dept: PHYSICAL THERAPY | Facility: REHABILITATION | Age: 27
End: 2017-05-10
Attending: PREVENTIVE MEDICINE
Payer: MEDICARE

## 2017-05-10 PROCEDURE — 97014 ELECTRIC STIMULATION THERAPY: CPT

## 2017-05-10 PROCEDURE — 97110 THERAPEUTIC EXERCISES: CPT

## 2017-05-12 ENCOUNTER — HOSPITAL ENCOUNTER (OUTPATIENT)
Dept: PHYSICAL THERAPY | Facility: REHABILITATION | Age: 27
End: 2017-05-12
Attending: PREVENTIVE MEDICINE
Payer: MEDICARE

## 2017-05-12 PROCEDURE — 97014 ELECTRIC STIMULATION THERAPY: CPT

## 2017-05-12 PROCEDURE — 97110 THERAPEUTIC EXERCISES: CPT

## 2017-05-16 ENCOUNTER — HOSPITAL ENCOUNTER (OUTPATIENT)
Dept: PHYSICAL THERAPY | Facility: REHABILITATION | Age: 27
End: 2017-05-16
Attending: PREVENTIVE MEDICINE
Payer: MEDICARE

## 2017-05-16 PROCEDURE — 97110 THERAPEUTIC EXERCISES: CPT

## 2017-05-16 PROCEDURE — 97014 ELECTRIC STIMULATION THERAPY: CPT

## 2017-05-18 ENCOUNTER — HOSPITAL ENCOUNTER (OUTPATIENT)
Dept: RADIOLOGY | Facility: MEDICAL CENTER | Age: 27
End: 2017-05-18
Attending: PREVENTIVE MEDICINE
Payer: MEDICARE

## 2017-05-18 DIAGNOSIS — S80.01XD CONTUSION OF RIGHT KNEE, SUBSEQUENT ENCOUNTER: ICD-10-CM

## 2017-05-18 PROCEDURE — 73721 MRI JNT OF LWR EXTRE W/O DYE: CPT | Mod: RT

## 2017-05-19 ENCOUNTER — OCCUPATIONAL MEDICINE (OUTPATIENT)
Dept: OCCUPATIONAL MEDICINE | Facility: CLINIC | Age: 27
End: 2017-05-19
Payer: MEDICARE

## 2017-05-19 VITALS
TEMPERATURE: 98.7 F | HEIGHT: 58 IN | HEART RATE: 84 BPM | SYSTOLIC BLOOD PRESSURE: 108 MMHG | BODY MASS INDEX: 30.02 KG/M2 | DIASTOLIC BLOOD PRESSURE: 62 MMHG | RESPIRATION RATE: 14 BRPM | WEIGHT: 143 LBS | OXYGEN SATURATION: 96 %

## 2017-05-19 DIAGNOSIS — S80.01XD CONTUSION OF RIGHT KNEE, SUBSEQUENT ENCOUNTER: ICD-10-CM

## 2017-05-19 PROCEDURE — 99213 OFFICE O/P EST LOW 20 MIN: CPT | Performed by: PREVENTIVE MEDICINE

## 2017-05-19 RX ORDER — PREDNISONE 20 MG/1
40 TABLET ORAL DAILY
Qty: 14 TAB | Refills: 0 | Status: SHIPPED | OUTPATIENT
Start: 2017-05-19 | End: 2017-05-26

## 2017-05-19 ASSESSMENT — ENCOUNTER SYMPTOMS
FOCAL WEAKNESS: 0
TINGLING: 0
SENSORY CHANGE: 0

## 2017-05-19 ASSESSMENT — PAIN SCALES - GENERAL: PAINLEVEL: 2=MINIMAL-SLIGHT

## 2017-05-19 NOTE — MR AVS SNAPSHOT
"        Veronica Kirby   2017 1:20 PM   Occupational Medicine   MRN: [id number]    Department:  [facility]   Dept Phone:  [phone]    Description:  Female : 1990   Provider:  Mik Mendez D.O.           Reason for Visit     Follow-Up WC [facility]- DOI 17- Right Knee getting a little- MRI in chart      Allergies as of 2017     Allergen Noted Reactions    Pcn [Penicillins] 2011   Anaphylaxis    Sulfa Drugs 2008   Anaphylaxis    Codeine 2008   Unspecified    Pt states \"I was a childhood allergie\".      You were diagnosed with     Contusion of right knee, subsequent encounter   [910567]         Vital Signs     Blood Pressure Pulse Temperature Respirations Height Weight    108/62 mmHg 84 37.1 °C (98.7 °F) 14 1.473 m (4' 10\") 64.864 kg (143 lb)    Body Mass Index Oxygen Saturation Breastfeeding? Smoking Status          29.89 kg/m2 96% No Current Every Day Smoker        Basic Information     Date Of Birth Sex Race Ethnicity Preferred Language    1990 Female White Non- English      Your appointments     2017  8:30 AM   Workers Compensation with Mik Mendez D.O.   [address] [id number] [phone]              Health Maintenance        Date Due Completion Dates    PAP SMEAR 2010 ---    IMM DTaP/Tdap/Td Vaccine (2 - Td) 2026            Current Immunizations     Tdap Vaccine 2016  8:42 PM    Tuberculin Skin Test 2014 11:47 AM, 2014 11:30 AM      Below and/or attached are the medications your provider expects you to take. Review all of your home medications and newly ordered medications with your provider and/or pharmacist. Follow medication instructions as directed by your provider and/or pharmacist. Please keep your medication list with you and share with your provider. Update the information when medications are discontinued, doses are changed, or new " medications (including over-the-counter products) are added; and carry medication information at all times in the event of emergency situations     Allergies:  PCN - Anaphylaxis     SULFA DRUGS - Anaphylaxis     CODEINE - Unspecified               Medications  Valid as of: May 19, 2017 -  2:16 PM    Generic Name Brand Name Tablet Size Instructions for use    Ascorbic Acid (Tab) ascorbic acid 500 MG Take 500 mg by mouth every day.        Hydrocodone-Acetaminophen (Tab) NORCO 5-325 MG Take 1-2 Tabs by mouth every four hours as needed.        Iron   Take 2 Tabs by mouth every day.        Melatonin   Take  by mouth.        Multiple Vitamins-Minerals (Tab) THERAGRAN-M  Take 1 Tab by mouth every day.        Naproxen (Tab) NAPROSYN 500 MG Take 500 mg by mouth as needed. Indications: Mild to Moderate Pain        PredniSONE (Tab) DELTASONE 20 MG Take 2 Tabs by mouth every day for 7 days.        .                 Medicines prescribed today were sent to:     [facility]/PHARMACY #[id number] - [place], [place] - [address] [place] [address] [address] [address]    Phone: [phone] Fax: [phone]    Open 24 Hours?: No      Medication refill instructions:       If your prescription bottle indicates you have medication refills left, it is not necessary to call your provider’s office. Please contact your pharmacy and they will refill your medication.    If your prescription bottle indicates you do not have any refills left, you may request refills at any time through one of the following ways: The online [employer] system (except Urgent Care), by calling your provider’s office, or by asking your pharmacy to contact your provider’s office with a refill request. Medication refills are processed only during regular business hours and may not be available until the next business day. Your provider may request additional information or to have a follow-up visit with you prior to refilling your medication.   *Please Note:  Medication refills are assigned a new Rx number when refilled electronically. Your pharmacy may indicate that no refills were authorized even though a new prescription for the same medication is available at the pharmacy. Please request the medicine by name with the pharmacy before contacting your provider for a refill.           [facility] Access Code: [email or web address]  Expires: 5/24/2017  1:30 PM    [facility]  A secure, online tool to manage your health information     [employer]’s [facility] Radiology Network® is a secure, online tool that connects you to your personalized health information from the privacy of your home -- day or night - making it very easy for you to manage your healthcare. Once the activation process is completed, you can even access your medical information using the [facility] niles, which is available for free in the Apple Niles store or Google Play store.     [facility]ounds Radiology Network provides the following levels of access (as shown below):   My Chart Features   [facility] Primary Care Doctor [facility]  Specialists [facility]  Urgent  Care [facility]  Primary Care  Doctor   Email your healthcare team securely and privately 24/7 X X X    Manage appointments: schedule your next appointment; view details of past/upcoming appointments X      Request prescription refills. X      View recent personal medical records, including lab and immunizations X X X X   View health record, including health history, allergies, medications X X X X   Read reports about your outpatient visits, procedures, consult and ER notes X X X X   See your discharge summary, which is a recap of your hospital and/or ER visit that includes your diagnosis, lab results, and care plan. X X       How to register for [facility] Network:  1. Go to  [email or web address].  2. Click on the Sign Up Now box, which takes you to the New Member Sign Up page. You will need to provide the following information:  a. Enter your [facility]iology Network Access Code exactly as it appears at the top of this page. (You will  not need to use this code after you’ve completed the sign-up process. If you do not sign up before the expiration date, you must request a new code.)   b. Enter your date of birth.   c. Enter your home email address.   d. Click Submit, and follow the next screen’s instructions.  3. Create a [employer] ID. This will be your [employer] login ID and cannot be changed, so think of one that is secure and easy to remember.  4. Create a [employer] password. You can change your password at any time.  5. Enter your Password Reset Question and Answer. This can be used at a later time if you forget your password.   6. Enter your e-mail address. This allows you to receive e-mail notifications when new information is available in [employer].  7. Click Sign Up. You can now view your health information.    For assistance activating your [employer] account, call [phone]        Quit Tobacco Information     Do you want to quit using tobacco?    Quitting tobacco decreases risks of cancer, heart and lung disease, increases life expectancy, improves sense of taste and smell, and increases spending money, among other benefits.    If you are thinking about quitting, we can help.  • [facility] Quit Tobacco Program: 342.391.9026  o Program occurs weekly for four weeks and includes pharmacist consultation on products to support quitting smoking or chewing tobacco. A provider referral is needed for pharmacist consultation.  • Tobacco Users Help Hotline: 2-169-QUIT-NOW (505-6770) or https://[place].quitlogix.org/  o Free, confidential telephone and online coaching for [place] residents. Sessions are designed on a schedule that is convenient for you. Eligible clients receive free nicotine replacement therapy.  • Nationally: www.smokefree.gov  o Information and professional assistance to support both immediate and long-term needs as you become, and remain, a non-smoker. Smokefree.gov allows you to choose the help that best fits your needs.

## 2017-05-19 NOTE — PROGRESS NOTES
"Subjective:      Veronica Kirby is a 27 y.o. female who presents with Follow-Up      DOI 3/19/2017: Slipped and fell on right bent knee. Negative knee xray on initial visit. Patient states that knee is a little better but continues to have pain with prolonged walking/standing and squatting. Pain is achy and occasionally sharp. She notes that her knee will pop and have increased pain afterwards. Patient completed PT with only minimal results. Takes naproxen for pain. She has been working 4 hour shifts. MRI was completed.     HPI    Review of Systems   Skin: Negative for rash.   Neurological: Negative for tingling, sensory change and focal weakness.     SOCHX: Works in the [employer] at [employer]  FH: No pertinent family history to this problem.         Objective:     /62 mmHg  Pulse 84  Temp(Src) 37.1 °C (98.7 °F)  Resp 14  Ht 1.473 m (4' 10\")  Wt 64.864 kg (143 lb)  BMI 29.89 kg/m2  SpO2 96%  Breastfeeding? No     Physical Exam   Constitutional: She is oriented to person, place, and time. She appears well-developed and well-nourished.   Cardiovascular: Normal rate.    Pulmonary/Chest: Effort normal.   Neurological: She is alert and oriented to person, place, and time.   Skin: Skin is warm and dry.   Psychiatric: She has a normal mood and affect. Judgment normal.       Right Knee: No swelling. Diffuse tenderness to palpation prepatellar region, patella and medial knee. Anterior/posterior drawer test negative. No laxity with varus or valgus stress. Able to squat with mild pain and difficulty. Antalgic gait       Assessment/Plan:     1. Contusion of right knee, subsequent encounter  - predniSONE (DELTASONE) 20 MG Tab; Take 2 Tabs by mouth every day for 7 days.  Dispense: 14 Tab; Refill: 0    Prescribed prednisone 40mg x 7 days  Okay for trial of full duty  Follow up 2 weeks    "

## 2017-05-19 NOTE — Clinical Note
[address],   [address] [place] [address]  Phone: [phone] - Fax: [phone]        [facility] Progress Report and Disability Certification  Date of Service: 5/19/2017   No Show:  No  Date / Time of Next Visit: 6/2/2017 @ 8:30 AM   Claim Information   Patient Name: Veronica Kibry  Claim Number:     Employer: [employer]  Date of Injury: 3/19/2017     Insurer / TPA: [employer]  ID / SSN:     Occupation: Deli Assistant  Diagnosis: The encounter diagnosis was Contusion of right knee, subsequent encounter.    Medical Information   Related to Industrial Injury?     Subjective Complaints:  DOI 3/19/2017: Slipped and fell on right bent knee. Negative knee xray on initial visit. Patient states that knee is a little better but continues to have pain with prolonged walking/standing and squatting. Pain is achy and occasionally sharp. She notes that her knee will pop and have increased pain afterwards. Patient completed PT with only minimal results. Takes naproxen for pain. She has been working 4 hour shifts. MRI was completed.   Objective Findings: Right Knee: No swelling. Diffuse tenderness to palpation prepatellar region, patella and medial knee. Anterior/posterior drawer test negative. No laxity with varus or valgus stress. Able to squat with mild pain and difficulty. Antalgic gait   Pre-Existing Condition(s):     Assessment:   Condition Improved    Status: Additional Care Required  Permanent Disability:No    Plan:      Diagnostics:      Comments:  Prescribed prednisone 40mg x 7 days  Okay for trial of full duty  Follow up 2 weeks    Disability Information   Status: Released to Full Duty    From:  5/19/2017  Through: 6/2/2017 Restrictions are:     Physical Restrictions   Sitting:    Standing:   Stooping:    Bending:      Squatting:   Walking:   Climbing:   Pushing:      Pulling:    Other:    Reaching Above Shoulder (L):    Reaching Above Shoulder (R):       Reaching Below Shoulder (L):    Reaching Below Shoulder (R):      Not to exceed Weight Limits   Carrying(hrs):   Weight Limit(lb):   Lifting(hrs):   Weight  Limit(lb):     Comments:      Repetitive Actions   Hands: i.e. Fine Manipulations from Grasping:     Feet: i.e. Operating Foot Controls:     Driving / Operate Machinery:     Physician Name: Mik Mendez D.O. Physician Signature: carterSignTAMIK MEYER D.O. e-Signature: Dr. Rojelio Whiting, Medical Director   Clinic Name / Location: [address],   [address]  [place] [address] Clinic Phone Number: Dept: [phone]   Appointment Time: 1:20 Pm Visit Start Time: 1:17 PM   Check-In Time:  1:13 Pm Visit Discharge Time: 1:42PM   Original-Treating Physician or Chiropractor    Page 2-Insurer/TPA    Page 3-Employer    Page 4-Employee

## 2017-06-02 ENCOUNTER — OCCUPATIONAL MEDICINE (OUTPATIENT)
Dept: OCCUPATIONAL MEDICINE | Facility: CLINIC | Age: 27
End: 2017-06-02
Payer: MEDICARE

## 2017-06-02 VITALS
SYSTOLIC BLOOD PRESSURE: 110 MMHG | HEIGHT: 58 IN | HEART RATE: 95 BPM | WEIGHT: 143 LBS | RESPIRATION RATE: 16 BRPM | TEMPERATURE: 97.4 F | BODY MASS INDEX: 30.02 KG/M2 | OXYGEN SATURATION: 98 % | DIASTOLIC BLOOD PRESSURE: 72 MMHG

## 2017-06-02 DIAGNOSIS — S80.01XD CONTUSION OF RIGHT KNEE, SUBSEQUENT ENCOUNTER: ICD-10-CM

## 2017-06-02 PROCEDURE — 99212 OFFICE O/P EST SF 10 MIN: CPT | Performed by: PREVENTIVE MEDICINE

## 2017-06-02 NOTE — Clinical Note
[address],   [address] [place] [address]  Phone: [phone] - Fax: [phone]        [facility] Progress Report and Disability Certification  Date of Service: 6/2/2017   No Show:  No  Date / Time of Next Visit:  MMI   Claim Information   Patient Name: Veronica Kirby  Claim Number:     Employer: [employer]  Date of Injury: 3/19/2017     Insurer / TPA: [employer]  ID / SSN:     Occupation: Deli Assistant  Diagnosis: The encounter diagnosis was Contusion of right knee, subsequent encounter.    Medical Information   Related to Industrial Injury?   Comments:indeterminate    Subjective Complaints:  DOI 3/19/2017: Slipped and fell on right bent knee. Negative knee xray on initial visit. MRI normal. Patient states the pain overall feels somewhat better but continues to have significant pain especially by the end of her work shift. She states that after standing or walking for any significant amount of time her pain increases. She does state that she has been able to tolerate her full duty job. She states at this point should like to close her claim and continue full duty. She took prednisone with minimal benefit.   Objective Findings: Right Knee: No swelling. Diffuse tenderness to palpation prepatellar region, patella and medial knee. Anterior/posterior drawer test negative. No laxity with varus or valgus stress. Able to squat with mild pain and difficulty.   Pre-Existing Condition(s):     Assessment:   Condition Improved    Status: Discharged /  MMI  Permanent Disability:No    Plan:      Diagnostics:      Comments:  Recommend OTC ointments, ibuprofen or Tylenol as needed for pain  Given normal MRI lack of improvement with physical therapy OTC remedies most appropriate choice  Released from care, MMI no ratable injury  Full duty    Disability Information   Status: Released to Full Duty    From:  6/2/2017  Through:    Restrictions are:     Physical Restrictions   Sitting:    Standing:    Stooping:    Bending:      Squatting:    Walking:    Climbing:    Pushing:      Pulling:    Other:    Reaching Above Shoulder (L):   Reaching Above Shoulder (R):       Reaching Below Shoulder (L):    Reaching Below Shoulder (R):      Not to exceed Weight Limits   Carrying(hrs):   Weight Limit(lb):   Lifting(hrs):   Weight  Limit(lb):     Comments:      Repetitive Actions   Hands: i.e. Fine Manipulations from Grasping:     Feet: i.e. Operating Foot Controls:     Driving / Operate Machinery:     Physician Name: Mik Mendez D.O. Physician Signature: carterSignTAMIK MEYER D.O. e-Signature: Dr. Rojelio Whiting, Medical Director   Clinic Name / Location: [address],   [address] [address] Clinic Phone Number: Dept: [phone]   Appointment Time: 8:30 Am Visit Start Time: 8:23 AM   Check-In Time:  8:15 Am Visit Discharge Time: 8:37 AM   Original-Treating Physician or Chiropractor    Page 2-Insurer/TPA    Page 3-Employer    Page 4-Employee

## 2017-06-02 NOTE — PROGRESS NOTES
"Subjective:      Veronica Kirby is a 27 y.o. female who presents with Follow-Up      DOI 3/19/2017: Slipped and fell on right bent knee. Negative knee xray on initial visit. MRI normal. Patient states the pain overall feels somewhat better but continues to have significant pain especially by the end of her work shift. She states that after standing or walking for any significant amount of time her pain increases. She does state that she has been able to tolerate her full duty job. She states at this point should like to close her claim and continue full duty. She took prednisone with minimal benefit.     HPI    ROS       Objective:     /72 mmHg  Pulse 95  Temp(Src) 36.3 °C (97.4 °F)  Resp 16  Ht 1.473 m (4' 9.99\")  Wt 64.864 kg (143 lb)  BMI 29.89 kg/m2  SpO2 98%     Physical Exam    Right Knee: No swelling. Diffuse tenderness to palpation prepatellar region, patella and medial knee. Anterior/posterior drawer test negative. No laxity with varus or valgus stress. Able to squat with mild pain and difficulty.       Assessment/Plan:     1. Contusion of right knee, subsequent encounter  Recommend OTC ointments, ibuprofen or Tylenol as needed for pain  Given normal MRI lack of improvement with physical therapy OTC remedies most appropriate choice  Released from care  Full duty        "

## 2017-06-02 NOTE — MR AVS SNAPSHOT
"        Veronica Kirby   2017 8:30 AM   Occupational Medicine   MRN: [id number]    [place]  [facility]   Dept Phone:  [phone]    Description:  Female : 1990   Provider:  Mik Mendez D.O.           Reason for Visit     Follow-Up WC DOI 3/19/17 Rt knee feeling better      Allergies as of 2017     Allergen Noted Reactions    Pcn [Penicillins] 2011   Anaphylaxis    Sulfa Drugs 2008   Anaphylaxis    Codeine 2008   Unspecified    Pt states \"I was a childhood allergie\".      You were diagnosed with     Contusion of right knee, subsequent encounter   [083646]         Vital Signs     Blood Pressure Pulse Temperature Respirations Height Weight    110/72 mmHg 95 36.3 °C (97.4 °F) 16 1.473 m (4' 9.99\") 64.864 kg (143 lb)    Body Mass Index Oxygen Saturation Smoking Status             29.89 kg/m2 98% Current Every Day Smoker         Basic Information     Date Of Birth Sex Race Ethnicity Preferred Language    1990 Female White Non- English      Health Maintenance        Date Due Completion Dates    PAP SMEAR 2010 ---    IMM DTaP/Tdap/Td Vaccine (2 - Td) 2026            Current Immunizations     Tdap Vaccine 2016  8:42 PM    Tuberculin Skin Test 2014 11:47 AM, 2014 11:30 AM      Below and/or attached are the medications your provider expects you to take. Review all of your home medications and newly ordered medications with your provider and/or pharmacist. Follow medication instructions as directed by your provider and/or pharmacist. Please keep your medication list with you and share with your provider. Update the information when medications are discontinued, doses are changed, or new medications (including over-the-counter products) are added; and carry medication information at all times in the event of emergency situations     Allergies:  PCN - Anaphylaxis     SULFA DRUGS - Anaphylaxis     CODEINE - Unspecified               "   Medications  Valid as of: June 02, 2017 -  8:39 AM    Generic Name Brand Name Tablet Size Instructions for use    Ascorbic Acid (Tab) ascorbic acid 500 MG Take 500 mg by mouth every day.        Hydrocodone-Acetaminophen (Tab) NORCO 5-325 MG Take 1-2 Tabs by mouth every four hours as needed.        Iron   Take 2 Tabs by mouth every day.        Melatonin   Take  by mouth.        Multiple Vitamins-Minerals (Tab) THERAGRAN-M  Take 1 Tab by mouth every day.        Naproxen (Tab) NAPROSYN 500 MG Take 500 mg by mouth as needed. Indications: Mild to Moderate Pain        .                 Medicines prescribed today were sent to:     [facility][id number] - [place], [place] - [address]    [address] [place] [address]    Phone: [phone] Fax: [phone]    Open 24 Hours?: No      Medication refill instructions:       If your prescription bottle indicates you have medication refills left, it is not necessary to call your provider’s office. Please contact your pharmacy and they will refill your medication.    If your prescription bottle indicates you do not have any refills left, you may request refills at any time through one of the following ways: The online [employer] system (except Urgent Care), by calling your provider’s office, or by asking your pharmacy to contact your provider’s office with a refill request. Medication refills are processed only during regular business hours and may not be available until the next business day. Your provider may request additional information or to have a follow-up visit with you prior to refilling your medication.   *Please Note: Medication refills are assigned a new Rx number when refilled electronically. Your pharmacy may indicate that no refills were authorized even though a new prescription for the same medication is available at the pharmacy. Please request the medicine by name with the pharmacy before contacting your provider for a refill.            [email or web address] Access Code: [email or web address]  Expires: 6/22/2017  4:14 AM    [email or web address]  A secure, online tool to manage your health information     [employer]’s [email or web address]® is a secure, online tool that connects you to your personalized health information from the privacy of your home -- day or night - making it very easy for you to manage your healthcare. Once the activation process is completed, you can even access your medical information using the [email or web address] niles, which is available for free in the Apple Niles store or Google Play store.     [email or web address] provides the following levels of access (as shown below):   My Chart Features   [facility] Primary Care Doctor [facility]  Specialists [facility]  Urgent  Care Non-[facility]  Primary Care  Doctor   Email your healthcare team securely and privately 24/7 X X X    Manage appointments: schedule your next appointment; view details of past/upcoming appointments X      Request prescription refills. X      View recent personal medical records, including lab and immunizations X X X X   View health record, including health history, allergies, medications X X X X   Read reports about your outpatient visits, procedures, consult and ER notes X X X X   See your discharge summary, which is a recap of your hospital and/or ER visit that includes your diagnosis, lab results, and care plan. X X       How to register for [email or web address]:  1. Go to  [email or web address].  2. Click on the Sign Up Now box, which takes you to the New Member Sign Up page. You will need to provide the following information:  a. Enter your [email or web address] Access Code exactly as it appears at the top of this page. (You will not need to use this code after you’ve completed the sign-up process. If you do not sign up before the expiration date, you must request a new code.)   b. Enter your date of birth.   c. Enter your home email address.   d. Click Submit, and follow the next screen’s instructions.  3. Create a [email or web address] ID. This will be your [email or web address]  login ID and cannot be changed, so think of one that is secure and easy to remember.  4. Create a [employer] password. You can change your password at any time.  5. Enter your Password Reset Question and Answer. This can be used at a later time if you forget your password.   6. Enter your e-mail address. This allows you to receive e-mail notifications when new information is available in [employer].  7. Click Sign Up. You can now view your health information.    For assistance activating your [employer] account, call [phone]        Quit Tobacco Information     Do you want to quit using tobacco?    Quitting tobacco decreases risks of cancer, heart and lung disease, increases life expectancy, improves sense of taste and smell, and increases spending money, among other benefits.    If you are thinking about quitting, we can help.  • [facility] Quit Tobacco Program: 786.518.5916  o Program occurs weekly for four weeks and includes pharmacist consultation on products to support quitting smoking or chewing tobacco. A provider referral is needed for pharmacist consultation.  • Tobacco Users Help Hotline: 9-452-QUIT-NOW (188-1190) or https://[place].quitlogix.org/  o Free, confidential telephone and online coaching for [place] residents. Sessions are designed on a schedule that is convenient for you. Eligible clients receive free nicotine replacement therapy.  • Nationally: www.smokefree.gov  o Information and professional assistance to support both immediate and long-term needs as you become, and remain, a non-smoker. Smokefree.gov allows you to choose the help that best fits your needs.

## 2018-05-29 ENCOUNTER — APPOINTMENT (OUTPATIENT)
Dept: RADIOLOGY | Facility: MEDICAL CENTER | Age: 28
End: 2018-05-29
Attending: EMERGENCY MEDICINE
Payer: COMMERCIAL

## 2018-05-29 ENCOUNTER — NON-PROVIDER VISIT (OUTPATIENT)
Dept: OCCUPATIONAL MEDICINE | Facility: CLINIC | Age: 28
End: 2018-05-29
Payer: COMMERCIAL

## 2018-05-29 ENCOUNTER — HOSPITAL ENCOUNTER (EMERGENCY)
Facility: MEDICAL CENTER | Age: 28
End: 2018-05-30
Attending: EMERGENCY MEDICINE
Payer: COMMERCIAL

## 2018-05-29 DIAGNOSIS — S40.012A CONTUSION OF LEFT SHOULDER, INITIAL ENCOUNTER: ICD-10-CM

## 2018-05-29 DIAGNOSIS — Z02.83 ENCOUNTER FOR DRUG SCREENING: ICD-10-CM

## 2018-05-29 PROCEDURE — 82075 ASSAY OF BREATH ETHANOL: CPT | Performed by: INTERNAL MEDICINE

## 2018-05-29 PROCEDURE — 80305 DRUG TEST PRSMV DIR OPT OBS: CPT | Performed by: INTERNAL MEDICINE

## 2018-05-29 PROCEDURE — 8899 PR URINE 11 PANEL - AFTER HOURS: Performed by: INTERNAL MEDICINE

## 2018-05-29 PROCEDURE — 99284 EMERGENCY DEPT VISIT MOD MDM: CPT

## 2018-05-29 NOTE — LETTER
"  FORM C-4:  EMPLOYEE’S CLAIM FOR COMPENSATION/ REPORT OF INITIAL TREATMENT  EMPLOYEE’S CLAIM - PROVIDE ALL INFORMATION REQUESTED   First Name  Veronica Last Name   Birthdate             Age  1990 28 y.o. Sex  female Claim Number   Home Employee Address  [address]  [place]                                     Zip  [address] Height  1.473 m (4' 10\") Weight  65.3 kg (143 lb 15.4 oz) [facility]  [id number]   Mailing Employee Address                           [address]   [place]               Zip  [address] Telephone  [phone] (home)  Primary Language Spoken  ENGLISH   Insurer  [employer] Third Party   [employer] Employee's Occupation (Job Title) When Injury or Occupational Disease Occurred     Employer's Name  [employer] Telephone  [phone]    Employer Address  [address] [facility] [[address]] Zip  [address]   Date of Injury  5/29/2018       Hour of Injury  1:00 AM Date Employer Notified  5/29/2018 Last Day of Work after Injury or Occupational Disease  5/29/2018 Supervisor to Whom Injury Reported  Wagner Reno/ Beto Celestin   Address or Location of Accident (if applicable)  [[employer]]   What were you doing at the time of accident? (if applicable)  Bent down to do a splice turned hit benjamin    How did this injury or occupational disease occur? Be specific and answer in detail. Use additional sheet if necessary)  I hit my Left shoulder on the left side chucks standing up after doing a splice.   If you believe that you have an occupational disease, when did you first have knowledge of the disability and it relationship to your employment?  No Witnesses to the Accident  N/A     Nature of Injury or Occupational Disease  Workers' Compensation  Part(s) of Body Injured or Affected  Shoulder (L), N/A, N/A    I certify that the above is true and correct to " the best of my knowledge and that I have provided this information in order to obtain the benefits of [place]  I hereby authorize any physician, chiropractor, surgeon, practitioner, or other person, any hospital, including [facility] or [facility], any medical service organization, any insurance company, or other institution or organization to release to each other, any medical or other information, including benefits paid or payable, pertinent to this injury or disease, except information relative to diagnosis, treatment and/or counseling for AIDS, psychological conditions, alcohol or controlled substances, for which I must give specific authorization.  A Photostat of this authorization shall be as valid as the original.   Date  05/30/2018 [facility] Employee’s Signature   THIS REPORT MUST BE COMPLETED AND MAILED WITHIN 3 WORKING DAYS OF TREATMENT   Place  [facility], EMERGENCY DEPT  Name of Facility   [facility]   Date  5/29/2018 Diagnosis  (S40.012A) Contusion of left shoulder, initial encounter Is there evidence the injured employee was under the influence of alcohol and/or another controlled substance at the time of accident?   Hour  12:40 AM Description of Injury or Disease  Contusion of left shoulder, initial encounter No   Treatment     Have you advised the patient to remain off work five days or more?         No   X-Ray Findings  Negative   If Yes   From Date    To Date      From information given by the employee, together with medical evidence, can you directly connect this injury or occupational disease as job incurred?  Yes If No, is the employee capable of: Full Duty  No Modified Duty  Yes   Is additional medical care by a physician indicated?  Yes If Modified Duty, Specify any Limitations /  "Restrictions  Limited use of left shoulder until cleared by occupational health     Do you know of any previous injury or disease contributing to this condition or occupational disease?  No   Date  5/30/2018 Print Doctor’s Name  Chiki Kwok I certify the employer’s copy of this form was mailed on:   Address  [address] [place] [id number] [phone] Insurer’s Use Only   [facility]  [id number]    Provider’s Tax ID Number  [id number] Telephone  Dept: [phone]    Doctor’s Signature  e-CHIKI Griffith M.D. Degree   M.D.    Original - TREATING PHYSICIAN OR CHIROPRACTOR   Pg 2-Insurer/TPA   Pg 3-Employer   Pg 4-Employee                                                                                                  Form C-4 (rev01/03)     BRIEF DESCRIPTION OF RIGHTS AND BENEFITS  (Pursuant to [place] 616C.050)    Notice of Injury or Occupational Disease (Incident Report Form C-1): If an injury or occupational disease (OD) arises out of and in the course of employment, you must provide written notice to your employer as soon as practicable, but no later than 7 days after the accident or OD. Your employer shall maintain a sufficient supply of the required forms.    Claim for Compensation (Form C-4): If medical treatment is sought, the form C-4 is available at the place of initial treatment. A completed \"Claim for Compensation\" (Form C-4) must be filed within 90 days after an accident or OD. The treating physician or chiropractor must, within 3 working days after treatment, complete and mail to the employer, the employer's insurer and third-party , the Claim for Compensation.    Medical Treatment: If you require medical treatment for your on-the-job injury or OD, you may be required to select a physician or chiropractor from a list provided by your workers’ compensation insurer, if it has contracted with an Organization for Managed Care (MCO) or Preferred Provider Organization (PPO) " or providers of health care. If your employer has not entered into a contract with an MCO or PPO, you may select a physician or chiropractor from the Panel of Physicians and Chiropractors. Any medical costs related to your industrial injury or OD will be paid by your insurer.    Temporary Total Disability (TTD): If your doctor has certified that you are unable to work for a period of at least 5 consecutive days, or 5 cumulative days in a 20-day period, or places restrictions on you that your employer does not accommodate, you may be entitled to TTD compensation.    Temporary Partial Disability (TPD): If the wage you receive upon reemployment is less than the compensation for TTD to which you are entitled, the insurer may be required to pay you TPD compensation to make up the difference. TPD can only be paid for a maximum of 24 months.    Permanent Partial Disability (PPD): When your medical condition is stable and there is an indication of a PPD as a result of your injury or OD, within 30 days, your insurer must arrange for an evaluation by a rating physician or chiropractor to determine the degree of your PPD. The amount of your PPD award depends on the date of injury, the results of the PPD evaluation and your age and wage.    Permanent Total Disability (PTD): If you are medically certified by a treating physician or chiropractor as permanently and totally disabled and have been granted a PTD status by your insurer, you are entitled to receive monthly benefits not to exceed 66 2/3% of your average monthly wage. The amount of your PTD payments is subject to reduction if you previously received a PPD award.    Vocational Rehabilitation Services: You may be eligible for vocational rehabilitation services if you are unable to return to the job due to a permanent physical impairment or permanent restrictions as a result of your injury or occupational disease.    Transportation and Per Vickie Reimbursement: You may be  eligible for travel expenses and per marni associated with medical treatment.  Reopening: You may be able to reopen your claim if your condition worsens after claim closure.    Appeal Process: If you disagree with a written determination issued by the insurer or the insurer does not respond to your request, you may appeal to the Department of Administration, , by following the instructions contained in your determination letter. You must appeal the determination within 70 days from the date of the determination letter at [address], [address], [place] [address], or [address], [place], [place] [address]. If you disagree with the  decision, you may appeal to the Department of Administration, . You must file your appeal within 30 days from the date of the  decision letter at [address], [address], [place], [place] [address], or [address], [place], [place] [address]. If you disagree with a decision of an , you may file a petition for judicial review with the District Court. You must do so within 30 days of the Appeal Officer’s decision. You may be represented by an  at your own expense or you may contact the [facility] for possible representation.    [place]  for Injured Workers (NAIW): If you disagree with a  decision, you may request that NAIW represent you without charge at an  Hearing. For information regarding denial of benefits, you may contact the [facility] at: [address], [address], [place] [address], [phone], or [address], [place] [address], [phone]    To File a Complaint with the Division: If you wish to file a complaint with the  of the Division of Industrial Relations (DIR), please contact the Workers’ Compensation Section, [address], [address], [place],  [place] [address], telephone [phone]) [phone], or [address], [address], [place] [address], telephone [phone].    For assistance with Workers’ Compensation Issues: you may contact the Office of the Governor Consumer Health Assistance, [address] [place] [address], [address], [place], [place] [address], Toll Free 1-291.800.5958, Web site: [email or web address]., E-mail [email or web address].                                                                                                                                                                                         __________________________________________________________________                                    _________________            Employee Name / Signature                                                                                                                            Date                                       D-2 (rev. 10/07)

## 2018-05-30 VITALS
SYSTOLIC BLOOD PRESSURE: 119 MMHG | BODY MASS INDEX: 30.22 KG/M2 | DIASTOLIC BLOOD PRESSURE: 72 MMHG | RESPIRATION RATE: 18 BRPM | HEIGHT: 58 IN | WEIGHT: 143.96 LBS | TEMPERATURE: 97.8 F | HEART RATE: 68 BPM | OXYGEN SATURATION: 96 %

## 2018-05-30 PROCEDURE — 73030 X-RAY EXAM OF SHOULDER: CPT | Mod: LT

## 2018-05-30 ASSESSMENT — PAIN SCALES - GENERAL
PAINLEVEL_OUTOF10: 8
PAINLEVEL_OUTOF10: 8

## 2018-05-30 NOTE — ED NOTES
Pt sitting on bed, states she hit the top of her left shoulder on a metal beam at work on the night of the 28th, but did not want to be seem at that time. Pt had discomfort to shoulder throughout 29th and limited ROM. Initially pt c/o numbness and tingling to left hand, but none at this time. CMS intact.

## 2018-05-30 NOTE — ED NOTES
ERP in to see pt.  Patient provided printed discharge instructions which included signs and symptoms to look out for, why to return to ER, and follow up appointment to make with Occupational Health. Patient stated they understand discharge instructions and had no further questions or concerns at this time. Patient discharged to home by self. Patient ambulated out of ER with stable gait.

## 2018-05-30 NOTE — ED PROVIDER NOTES
"CHIEF COMPLAINT  Chief Complaint   Patient presents with   • Shoulder Injury     Hit shoulder on steel beam       hospitals  Veronica Kirby is a 28 y.o. female who presents with left shoulder pain. States that she hit her shoulder on a steel beam at work yesterday. Today, is experiencing pain with range of motion and limited range of motion. Neurovascularly intact distally. Denies prior shoulder injuries in the past. She is right-hand dominant. No chest pain or trouble breathing. Pain is to the posterior lateral aspect of the shoulder.    REVIEW OF SYSTEMS  See hospitals for further details. All other systems are negative.     PAST MEDICAL HISTORY   has a past medical history of Psychiatric disorder.    SOCIAL HISTORY  Social History     Social History Main Topics   • Smoking status: Current Every Day Smoker     Packs/day: 0.50   • Smokeless tobacco: Not on file   • Alcohol use 0.0 oz/week      Comment: social   • Drug use: No   • Sexual activity: Not on file       SURGICAL HISTORY  patient denies any surgical history    CURRENT MEDICATIONS  Home Medications     Reviewed by Massiel Woodward R.N. (Registered Nurse) on 05/30/18 at 0013  Med List Status: Not Addressed   Medication Last Dose Status   ascorbic acid (ASCORBIC ACID) 500 MG Tab 5/29/2018 Active   IRON PO 3/31/2017 Active   MELATONIN PO 5/24/2018 Active   naproxen (NAPROSYN) 500 MG Tab 5/29/2018 Active                ALLERGIES  Allergies   Allergen Reactions   • Pcn [Penicillins] Anaphylaxis   • Sulfa Drugs Anaphylaxis   • Codeine Unspecified     Pt states \"I was a childhood allergie\".       PHYSICAL EXAM  VITAL SIGNS: /72   Pulse 81   Temp 36.6 °C (97.8 °F)   Resp 19   Ht 1.473 m (4' 10\")   Wt 65.3 kg (143 lb 15.4 oz)   SpO2 99%   BMI 30.09 kg/m²   Pulse ox interpretation: I interpret this pulse ox as normal.  Constitutional: Alert in no apparent distress.  HENT: No signs of trauma, Bilateral external ears normal, Nose normal.   Neck: Normal range of motion, " "No tenderness, Supple, No stridor.   Cardiovascular: Regular rate and rhythm.   Thorax & Lungs: Normal breath sounds, No respiratory distress, No chest tenderness.   Skin: Warm, Dry, No erythema, No rash.   Back: No bony tenderness, No CVA tenderness.   Extremities: Intact distal pulses, No edema, No tenderness, No cyanosis  Musculoskeletal: Pain with range of motion of the left shoulder especially greater than 90°, no deformities. Neurovascularly intact distally. Noted.     DIAGNOSTIC STUDIES / PROCEDURES    RADIOLOGY  DX-SHOULDER 2+ LEFT   Final Result         1.  No acute traumatic bony injury.             COURSE & MEDICAL DECISION MAKING  Pertinent Labs & Imaging studies reviewed. (See chart for details)  28 y.o. female presenting with left shoulder pain following blunt trauma while at work yesterday. States that she stood up and turned and hit her shoulder on a steel beam. Has had worsening pain since then. No obvious deformity. No bruising noted on the skin. Neurovascular intact distally. She does have point tenderness to the posterior lateral aspect of the shoulder. She states she experienced the blunt trauma. X-ray was performed that was found to be unremarkable. No bony injury was identified. Likely with isolated contusion.    Recommending follow-up with occupational health for further management. Given her pain symptoms, recommending limited use of her extremity until cleared.    The patient will return for worsening symptoms or failure of improvement and is stable at the time of discharge. The patient verbalizes understanding in their own words.    /72   Pulse 81   Temp 36.6 °C (97.8 °F)   Resp 19   Ht 1.473 m (4' 10\")   Wt 65.3 kg (143 lb 15.4 oz)   LMP 05/22/2018   SpO2 99%   BMI 30.09 kg/m²     The patient was referred to primary care where they will receive further BP management.      Pcp Pt States None          [facility]  [address]  [place] [address]  [phone]    In 1 " day      [facility], Emergency Dept  [address]  [place] [address]  [phone]    As needed, If symptoms worsen      FINAL IMPRESSION  1. Contusion of left shoulder, initial encounter            Electronically signed by: Chiki Kwok, 5/29/2018 11:37 PM

## 2018-05-30 NOTE — DISCHARGE INSTRUCTIONS
Contusion    A contusion is a deep bruise. Contusions are the result of a blunt injury to tissues and muscle fibers under the skin. The injury causes bleeding under the skin. The skin overlying the contusion may turn blue, purple, or yellow. Minor injuries will give you a painless contusion, but more severe contusions may stay painful and swollen for a few weeks.  What are the causes?  This condition is usually caused by a blow, trauma, or direct force to an area of the body.  What are the signs or symptoms?  Symptoms of this condition include:  · Swelling of the injured area.  · Pain and tenderness in the injured area.  · Discoloration. The area may have redness and then turn blue, purple, or yellow.  How is this diagnosed?  This condition is diagnosed based on a physical exam and medical history. An X-ray, CT scan, or MRI may be needed to determine if there are any associated injuries, such as broken bones (fractures).  How is this treated?  Specific treatment for this condition depends on what area of the body was injured. In general, the best treatment for a contusion is resting, icing, applying pressure to (compression), and elevating the injured area. This is often called the RICE strategy. Over-the-counter anti-inflammatory medicines may also be recommended for pain control.  Follow these instructions at home:  · Rest the injured area.  · If directed, apply ice to the injured area:  ? Put ice in a plastic bag.  ? Place a towel between your skin and the bag.  ? Leave the ice on for 20 minutes, 2-3 times per day.  · If directed, apply light compression to the injured area using an elastic bandage. Make sure the bandage is not wrapped too tightly. Remove and reapply the bandage as directed by your health care provider.  · If possible, raise (elevate) the injured area above the level of your heart while you are sitting or lying down.  · Take over-the-counter and prescription medicines only as told by your health  care provider.  Contact a health care provider if:  · Your symptoms do not improve after several days of treatment.  · Your symptoms get worse.  · You have difficulty moving the injured area.  Get help right away if:  · You have severe pain.  · You have numbness in a hand or foot.  · Your hand or foot turns pale or cold.  This information is not intended to replace advice given to you by your health care provider. Make sure you discuss any questions you have with your health care provider.  Document Released: 09/27/2006 Document Revised: 04/27/2017 Document Reviewed: 05/04/2016  Elsevier Interactive Patient Education © 2017 Elsevier Inc.

## 2018-05-31 ENCOUNTER — OCCUPATIONAL MEDICINE (OUTPATIENT)
Dept: OCCUPATIONAL MEDICINE | Facility: CLINIC | Age: 28
End: 2018-05-31
Payer: COMMERCIAL

## 2018-05-31 VITALS
HEIGHT: 59 IN | WEIGHT: 144 LBS | TEMPERATURE: 98.4 F | HEART RATE: 76 BPM | OXYGEN SATURATION: 98 % | RESPIRATION RATE: 14 BRPM | BODY MASS INDEX: 29.03 KG/M2

## 2018-05-31 DIAGNOSIS — S40.012D CONTUSION OF LEFT SHOULDER, SUBSEQUENT ENCOUNTER: ICD-10-CM

## 2018-05-31 PROCEDURE — 99213 OFFICE O/P EST LOW 20 MIN: CPT | Performed by: PREVENTIVE MEDICINE

## 2018-05-31 ASSESSMENT — ENCOUNTER SYMPTOMS
FOCAL WEAKNESS: 0
FEVER: 0
TINGLING: 0
NECK PAIN: 0
SENSORY CHANGE: 0

## 2018-05-31 ASSESSMENT — PAIN SCALES - GENERAL: PAINLEVEL: NO PAIN

## 2018-05-31 NOTE — LETTER
[address],   [address] [place] [address]  Phone:  [phone] - Fax:  [phone]   Occupational Health [facility] Progress Report and Disability Certification  Date of Service: 5/31/2018   No Show:  No  Date / Time of Next Visit: 6/6/2018@10:05am   Claim Information   Patient Name: Veronica Kirby  Claim Number:     Employer: [employer]  Date of Injury: 5/29/2018     Insurer / TPA: [employer]  ID / SSN:     Occupation: [employer]   Diagnosis: The encounter diagnosis was Contusion of left shoulder, subsequent encounter.    Medical Information   Related to Industrial Injury? Yes    Subjective Complaints:  DOI 5/29/2018: 29 yo female presents with left shoulder injury. She bent down and on her way back up hit her left shoulder on a large benjamin. She was seen in the ER, x-rays of the left shoulder were negative. Patient has noticed some improvement shoulder pain and range of motion. However she does continue note point tenderness at the superior aspect of the shoulder and somewhat limited range of motion. Denies any radiating pain, numbness or tingling. Denies prior shoulder injuries.   Objective Findings: Left shoulder: No gross deformity. Point tenderness AC joint. Somewhat decrease in flexion and abduction. Strength intact.   Pre-Existing Condition(s):     Assessment:   Condition Improved    Status: Additional Care Required  Permanent Disability:No    Plan:      Diagnostics:      Comments:  Recommend OTC ibuprofen as needed  Recommend heat/ice as needed  Recommend gentle exercises as demonstrated to 3 times per day  Restricted duty  Follow-up next week    Disability Information   Status: Released to Restricted Duty    From:  5/31/2018  Through: 6/6/2018 Restrictions are: Temporary   Physical Restrictions   Sitting:    Standing:    Stooping:    Bending:      Squatting:    Walking:    Climbing:    Pushing:       Pulling:    Other:    Reaching Above Shoulder (L): 0 hrs/day Reaching Above Shoulder (R):       Reaching Below Shoulder (L):    Reaching Below Shoulder (R):      Not to exceed Weight Limits   Carrying(hrs):   Weight Limit(lb):   Lifting(hrs):   Weight  Limit(lb):     Comments: Limit left arm to less than 10 pounds lifting    Repetitive Actions   Hands: i.e. Fine Manipulations from Grasping:     Feet: i.e. Operating Foot Controls:     Driving / Operate Machinery:     Physician Name: Mik Mendez D.O. Physician Signature: MIK Penaloza D.O. e-Signature: Dr. Rojelio Whiting, Medical Director   Clinic Name / Location: [address],   [address] [place] [address] Clinic Phone Number: Dept: [phone]   Appointment Time: 11:25 Am Visit Start Time: 11:07 AM   Check-In Time:  10:59 Am Visit Discharge Time:  11:35am   Original-Treating Physician or Chiropractor    Page 2-Insurer/TPA    Page 3-Employer    Page 4-Employee

## 2018-05-31 NOTE — PROGRESS NOTES
"Subjective:      Veronica Kirby is a 28 y.o. female who presents with Follow-Up (WC DOI 5/29/2018  - Shoulder (L) - better - room 3)      DOI 5/29/2018: 29 yo female presents with left shoulder injury. She bent down and on her way back up hit her left shoulder on a large benjamin. She was seen in the ER, x-rays of the left shoulder were negative. Patient has noticed some improvement shoulder pain and range of motion. However she does continue note point tenderness at the superior aspect of the shoulder and somewhat limited range of motion. Denies any radiating pain, numbness or tingling. Denies prior shoulder injuries.     HPI    Review of Systems   Constitutional: Negative for fever.   Musculoskeletal: Negative for neck pain.   Skin: Negative for itching and rash.   Neurological: Negative for tingling, sensory change and focal weakness.      SOCHX: Works as a  at [email or web address]   FH: No pertinent family history to this problem.     Objective:     Pulse 76   Temp 36.9 °C (98.4 °F)   Resp 14   Ht 1.499 m (4' 11\")   Wt 65.3 kg (144 lb)   LMP 05/22/2018   SpO2 98%   BMI 29.08 kg/m²      Physical Exam   Constitutional: She is oriented to person, place, and time. She appears well-developed and well-nourished.   Cardiovascular: Normal rate.    Pulmonary/Chest: Effort normal.   Neurological: She is alert and oriented to person, place, and time. She displays normal reflexes.   Skin: Skin is warm and dry.   Psychiatric: She has a normal mood and affect.       Left shoulder: No gross deformity. Point tenderness AC joint. Somewhat decrease in flexion and abduction. Strength intact.       Assessment/Plan:     1. Contusion of left shoulder, subsequent encounter  Recommend OTC ibuprofen as needed  Recommend heat/ice as needed  Recommend gentle exercises as demonstrated to 3 times per day  Restricted duty  Follow-up next week      "

## 2018-06-06 LAB
AMP AMPHETAMINE: NORMAL
BAR BARBITURATES: NORMAL
BREATH ALCOHOL COMMENT: NORMAL
BZO BENZODIAZEPINES: NORMAL
COC COCAINE: NORMAL
INT CON NEG: NEGATIVE
INT CON POS: POSITIVE
MDMA ECSTASY: NORMAL
MET METHAMPHETAMINES: NORMAL
MTD METHADONE: NORMAL
OPI OPIATES: NORMAL
OXY OXYCODONE: NORMAL
PCP PHENCYCLIDINE: NORMAL
POC BREATHALIZER: 0 PERCENT (ref 0–0.01)
POC URINE DRUG SCREEN OCDRS: NORMAL
THC: NORMAL

## 2018-06-08 ENCOUNTER — OCCUPATIONAL MEDICINE (OUTPATIENT)
Dept: OCCUPATIONAL MEDICINE | Facility: CLINIC | Age: 28
End: 2018-06-08
Payer: COMMERCIAL

## 2018-06-08 VITALS
WEIGHT: 144 LBS | DIASTOLIC BLOOD PRESSURE: 72 MMHG | HEART RATE: 73 BPM | SYSTOLIC BLOOD PRESSURE: 112 MMHG | BODY MASS INDEX: 29.03 KG/M2 | OXYGEN SATURATION: 98 % | RESPIRATION RATE: 16 BRPM | HEIGHT: 59 IN | TEMPERATURE: 98.2 F

## 2018-06-08 DIAGNOSIS — S40.012D CONTUSION OF LEFT SHOULDER, SUBSEQUENT ENCOUNTER: ICD-10-CM

## 2018-06-08 PROCEDURE — 99213 OFFICE O/P EST LOW 20 MIN: CPT | Performed by: PREVENTIVE MEDICINE

## 2018-06-08 ASSESSMENT — ENCOUNTER SYMPTOMS
SENSORY CHANGE: 0
TINGLING: 0

## 2018-06-08 ASSESSMENT — PAIN SCALES - GENERAL: PAINLEVEL: 5=MODERATE PAIN

## 2018-06-08 NOTE — PROGRESS NOTES
"Subjective:      Veronica Kirby is a 28 y.o. female who presents with Follow-Up (WC DOI 5/29/2018  -  L Shoulder little Better Room #3)      DOI 5/29/2018: 27 yo female presents with left shoulder injury. She bent down and on her way back up hit her left shoulder on a large benjamin. She was seen in the ER, x-rays of the left shoulder were negative. Patient has noted gradual improvement in left shoulder pain. Denies any radiating pain. However she does have an area of tenderness in the posterior shoulder. She has improved range of motion. She's been using any Chinese herbal ointment which is held. She's been working light duty.     HPI    Review of Systems   Neurological: Negative for tingling and sensory change.     SOCHX: Works as a  at [employer]   FH: No pertinent family history to this problem.     Objective:     /72   Pulse 73   Temp 36.8 °C (98.2 °F)   Resp 16   Ht 1.499 m (4' 11\")   Wt 65.3 kg (144 lb)   LMP 05/22/2018   SpO2 98%   BMI 29.08 kg/m²      Physical Exam   Constitutional: She is oriented to person, place, and time. She appears well-developed and well-nourished.   Cardiovascular: Normal rate.    Pulmonary/Chest: Effort normal.   Neurological: She is alert and oriented to person, place, and time.   Skin: Skin is warm and dry.   Psychiatric: She has a normal mood and affect.       Left shoulder: No gross deformity. Point tenderness over scapular spine. Full range of motion. Strength intact.       Assessment/Plan:     1. Contusion of left shoulder, subsequent encounter  Recommend OTC ibuprofen as needed  Recommend heat/ice as needed  Recommend OTC ointment/cream as needed  Restricted duty  Follow-up next week, we'll consider released to full duty next visit      "

## 2018-06-08 NOTE — LETTER
[address],   [address] [address]  Phone:  [phone] - Fax:  [phone]   Occupational Health [facility] Progress Report and Disability Certification  Date of Service: 6/8/2018   No Show:  No  Date / Time of Next Visit: 6/15/2018 @1:30pm   Claim Information   Patient Name: Veronica Kirby  Claim Number:     Employer: [employer]  Date of Injury: 5/29/2018     Insurer / TPA: [employer]  ID / SSN:     Occupation: [employer]   Diagnosis: The encounter diagnosis was Contusion of left shoulder, subsequent encounter.    Medical Information   Related to Industrial Injury? Yes    Subjective Complaints:  DOI 5/29/2018: 27 yo female presents with left shoulder injury. She bent down and on her way back up hit her left shoulder on a large benjamin. She was seen in the ER, x-rays of the left shoulder were negative. Patient has noted gradual improvement in left shoulder pain. Denies any radiating pain. However she does have an area of tenderness in the posterior shoulder. She has improved range of motion. She's been using any Chinese herbal ointment which is held. She's been working light duty.   Objective Findings: Left shoulder: No gross deformity. Point tenderness over scapular spine. Full range of motion. Strength intact.   Pre-Existing Condition(s):     Assessment:   Condition Improved    Status: Additional Care Required  Permanent Disability:No    Plan:      Diagnostics:      Comments:  Recommend OTC ibuprofen as needed  Recommend heat/ice as needed  Recommend OTC ointment/cream as needed  Restricted duty  Follow-up next week, we'll consider released to full duty next visit    Disability Information   Status: Released to Restricted Duty    From:  6/8/2018  Through: 6/15/2018 Restrictions are: Temporary   Physical Restrictions   Sitting:    Standing:    Stooping:    Bending:      Squatting:    Walking:    Climbing:   Pushing:      Pulling:    Other:    Reaching Above Shoulder (L): < or = to 1 hr/day Reaching Above Shoulder (R):       Reaching Below Shoulder (L):    Reaching Below Shoulder (R):      Not to exceed Weight Limits   Carrying(hrs):   Weight Limit(lb):   Lifting(hrs):   Weight  Limit(lb):     Comments: Limit left arm to less than 10 pounds lifting    Repetitive Actions   Hands: i.e. Fine Manipulations from Grasping:     Feet: i.e. Operating Foot Controls:     Driving / Operate Machinery:     Physician Name: Mik Mendez D.O. Physician Signature: MIK Penaloza D.O. e-Signature: Dr. Rojelio Whiting, Medical Director   Clinic Name / Location: [address],   [address] [place] [address] Clinic Phone Number: Dept: [phone]   Appointment Time: 1:00 Pm Visit Start Time: 12:46 PM   Check-In Time:  12:43 Pm Visit Discharge Time:  1:09pm   Original-Treating Physician or Chiropractor    Page 2-Insurer/TPA    Page 3-Employer    Page 4-Employee

## 2018-06-13 ENCOUNTER — OCCUPATIONAL MEDICINE (OUTPATIENT)
Dept: OCCUPATIONAL MEDICINE | Facility: CLINIC | Age: 28
End: 2018-06-13
Payer: COMMERCIAL

## 2018-06-13 VITALS
DIASTOLIC BLOOD PRESSURE: 80 MMHG | TEMPERATURE: 97.6 F | HEIGHT: 59 IN | SYSTOLIC BLOOD PRESSURE: 116 MMHG | BODY MASS INDEX: 28.83 KG/M2 | HEART RATE: 94 BPM | RESPIRATION RATE: 12 BRPM | WEIGHT: 143 LBS

## 2018-06-13 DIAGNOSIS — S40.012D CONTUSION OF LEFT SHOULDER, SUBSEQUENT ENCOUNTER: ICD-10-CM

## 2018-06-13 PROBLEM — S40.012A CONTUSION OF LEFT SHOULDER: Status: ACTIVE | Noted: 2018-06-13

## 2018-06-13 PROCEDURE — 99213 OFFICE O/P EST LOW 20 MIN: CPT | Performed by: PREVENTIVE MEDICINE

## 2018-06-13 ASSESSMENT — PAIN SCALES - GENERAL: PAINLEVEL: 8=MODERATE-SEVERE PAIN

## 2018-06-13 ASSESSMENT — ENCOUNTER SYMPTOMS
SENSORY CHANGE: 0
TINGLING: 0

## 2018-06-13 NOTE — LETTER
[address],   [address] [address]  Phone:  [phone] - Fax:  [phone]   Occupational Health [facility] Progress Report and Disability Certification  Date of Service: 6/13/2018   No Show:  No  Date / Time of Next Visit: 6/29/2018@9:55am   Claim Information   Patient Name: Veronica Kirby  Claim Number:     Employer: [employer]  Date of Injury: 5/29/2018     Insurer / TPA: [employer]  ID / SSN:     Occupation: [employer]   Diagnosis: The encounter diagnosis was Contusion of left shoulder, subsequent encounter.    Medical Information   Related to Industrial Injury? Yes    Subjective Complaints:  DOI 5/29/2018: 27 yo female presents with left shoulder injury. She bent down and on her way back up hit her left shoulder on a large benjamin. X-rays of the left shoulder were negative. Patient states she had been gradually improving however a few days ago at work she was stretching her shoulder and felt a pop and immediate increase pain. She noted decreased range of motion. She states she stayed home from work last night to Ice her shoulder. She's taking ibuprofen as well.   Objective Findings: Left shoulder: No gross deformity. Diffuse tenderness over the anterior GH and lateral shoulder. Decreased range of motion to 90° flexion and abduction. Slight weakness with abduction.   Pre-Existing Condition(s):     Assessment:   Condition Worsened    Status: Additional Care Required  Permanent Disability:No    Plan:      Diagnostics:      Comments:  Given duration of injury and sudden worsening referral for MRI left shoulder  Referral for physical therapy  Continue ibuprofen and icing  Continue gentle stretching  Continue restricted duty  Follow-up 2 weeks  Please excuse worker from work last ni  ght due to exacerbation of shoulder injury    Disability Information   Status: Released to Restricted Duty    From:   6/13/2018  Through: 6/29/2018 Restrictions are: Temporary   Physical Restrictions   Sitting:    Standing:    Stooping:    Bending:      Squatting:    Walking:    Climbing:    Pushing:      Pulling:    Other:    Reaching Above Shoulder (L): < or = to 1 hr/day Reaching Above Shoulder (R):       Reaching Below Shoulder (L):    Reaching Below Shoulder (R):      Not to exceed Weight Limits   Carrying(hrs):   Weight Limit(lb):   Lifting(hrs):   Weight  Limit(lb):     Comments: Limit left arm to less than 10 pounds lifting     Repetitive Actions   Hands: i.e. Fine Manipulations from Grasping:     Feet: i.e. Operating Foot Controls:     Driving / Operate Machinery:     Physician Name: Mik Mendez D.O. Physician Signature: MIK Penaloza D.O. e-Signature: Dr. Rojelio Whiting, Medical Director   Clinic Name / Location: [address],   [address] [place] [address] Clinic Phone Number: Dept: [phone]   Appointment Time: 9:15 Am Visit Start Time: 9:19 AM   Check-In Time:  9:05 Am Visit Discharge Time:  9:36am   Original-Treating Physician or Chiropractor    Page 2-Insurer/TPA    Page 3-Employer    Page 4-Employee

## 2018-06-13 NOTE — PROGRESS NOTES
"Subjective:      Veronica Kirby is a 28 y.o. female who presents with Follow-Up (WC DOI 05/29/2018 - L Shoulder - worse - room 2)      DOI 5/29/2018: 27 yo female presents with left shoulder injury. She bent down and on her way back up hit her left shoulder on a large benjamin. X-rays of the left shoulder were negative. Patient states she had been gradually improving however a few days ago at work she was stretching her shoulder and felt a pop and immediate increase pain. She noted decreased range of motion. She states she stayed home from work last night to Ice her shoulder. She's taking ibuprofen as well.     HPI    Review of Systems   Neurological: Negative for tingling and sensory change.     SOCHX: Works as a  at [employer]   FH: No pertinent family history to this problem.     Objective:     /80   Pulse 94   Temp 36.4 °C (97.6 °F)   Resp 12   Ht 1.499 m (4' 11\")   Wt 64.9 kg (143 lb)   LMP 05/22/2018   BMI 28.88 kg/m²      Physical Exam   Constitutional: She is oriented to person, place, and time. She appears well-developed and well-nourished.   Cardiovascular: Normal rate.    Pulmonary/Chest: Effort normal.   Neurological: She is alert and oriented to person, place, and time.   Skin: Skin is warm and dry.   Psychiatric: She has a normal mood and affect. Judgment normal.       Left shoulder: No gross deformity. Diffuse tenderness over the anterior GH and lateral shoulder. Decreased range of motion to 90° flexion and abduction. Slight weakness with abduction.       Assessment/Plan:     1. 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

## 2018-06-15 ENCOUNTER — PHYSICAL THERAPY (OUTPATIENT)
Dept: PHYSICAL THERAPY | Facility: REHABILITATION | Age: 28
End: 2018-06-15
Attending: PREVENTIVE MEDICINE
Payer: COMMERCIAL

## 2018-06-15 DIAGNOSIS — S40.012D CONTUSION OF LEFT SHOULDER, SUBSEQUENT ENCOUNTER: ICD-10-CM

## 2018-06-15 DIAGNOSIS — S40.012D: ICD-10-CM

## 2018-06-15 PROCEDURE — 97014 ELECTRIC STIMULATION THERAPY: CPT

## 2018-06-15 PROCEDURE — 97161 PT EVAL LOW COMPLEX 20 MIN: CPT

## 2018-06-15 ASSESSMENT — ENCOUNTER SYMPTOMS
QUALITY: ACHING
PAIN SCALE AT LOWEST: 3
PAIN TIMING: CONTINUOUSLY
QUALITY: STABBING
PAIN SCALE: 8
PAIN SCALE AT HIGHEST: 8

## 2018-06-15 NOTE — OP THERAPY EVALUATION
Outpatient Physical Therapy  INITIAL EVALUATION    [facility] Physical Therapy [address]  [address]  [place] [address]  Phone:  [phone]  Fax:  [phone]    Date of Evaluation: 06/15/2018    Patient: Veronica Kirby  YOB: 1990  MRN: [id number]     Referring Provider: Mik Mendez D.O.  [address] [facility] [address]  [place] [address]   Referring Diagnosis Contusion of left shoulder, subsequent encounter [S40.012D]     Time Calculation  Start time: 945  Stop time:  Time Calculation (min): 60 minutes     Physical Therapy Occurrence Codes    Date of onset of impairment:  18   Date physical therapy care plan established or reviewed:  6/15/18   Date physical therapy treatment started:  6/15/18          Chief Complaint: Shoulder Injury    Visit Diagnoses     ICD-10-CM   1. Contusion of left shoulder, subsequent encounter S40.012D   2. Contusion of scapula, left, subsequent encounter S40.012D         Subjective   History of Present Illness:     Date of onset:  2018    History of chief complaint:  Pt works at [employer] and she was bent over and came up and hit shoulder on a heavy machine. Pt states she had a 3/10 pain until monday when she did some pendulum exercise and it's been hurting a lot more since then. Xray was normal, md ordering an MRI. Pt having MRI done tomorrow.     Prior level of function:  Active and pain free    Pain:     Current pain ratin    At best pain rating:  3    At worst pain ratin    Location:  Pain along collar bone and anterior shoulder into deltoid.     Quality:  Aching and stabbing    Pain timing:  Continuously    Aggravating factors:  Any movement aggravates shoulder now, side lying .     Relieving factors:  Ice, laying down, not using arm.     Progression:  Worsening    Activity Tolerance:     Current activity tolerance / Recreational activities:  Usually goes to the gym and lifts weights and does some martial arts.     Work:   [employer], light duty with 10 pound limit and limited overhead activities . 3-4 12 hour shifts.     Hand Dominance:     Hand dominance:  Ambidextrous    Diagnostic Tests:     X-ray: normal      Treatments:     Treatments to date:  Ice, aspirin    Patient Goals:     Patient goals for therapy:  Eliminate pain, work without restrictions, return to gym and martial arts.       Past Medical History:   Diagnosis Date   • Psychiatric disorder     PTSD, personality disorder     No past surgical history on file.    Precautions:       Objective   Observation and functional movement:  Pt is holding arm into her side and is very guarded with using it.     Range of motion and strength:    AROM very limited and pt has high pain behavior. Flexion 87 degrees, abduction 55 degrees, HBB wrist to L1, ER 35 degrees. All movements provoke pain.     Strength is also difficult to assess secondary to pain.     Sensation and reflexes:     Sensation is intact.      Reflexes are normal and symmetrical.    Palpation and joint mobility:     Pt has diffuse tenderness left clavicle, biceps, and deltoid. Difficult to assess GH passive and accessory movements bc of guarding and pain.     Special tests:      Most tests are positive: crossover, speeds, meyer.             Therapeutic Exercises (CPT 20973):       Therapeutic Exercise Summary: Pt instructed to start gentle active and passive ROM exercises as tolerated. She has access to a pool, so I encouraged her to try gentle stretching with assistance from the water.     Therapeutic Treatments and Modalities:     1. Manual Therapy (CPT 51123), IASTM very gentle to deltoid    2. E Stim Unattended (CPT 21442), IFC and ice to left shoulder    Time-based treatments/modalities:          Assessment, Response and Plan:   Impairments: abnormal ADL function, abnormal muscle firing, abnormal or restricted ROM, difficulty performing job, impaired functional mobility, impaired physical strength, lacks  appropriate home exercise program and pain with function    Assessment details:  This is a 28 year old female 2.5 weeks s/p left shoulder injury/contusion with significant limitations in mobility. Difficult to identify specific structures secondary to pain and guarding. Pt is having MRI tomorrow.   Prognosis: fair    Goals:   Short Term Goals:   Pt compliant with doing regular A/PROM exercises hourly.     Short term goal time span:  1-2 weeks      Long Term Goals:    Normalize ROM in left shoulder  Pt able to sleep without pain waking her up  Pt able to do overhead activities without pain.  Improve quickdash score from 77% to 20 %.   Long term goal time span:  4-6 weeks    Plan:   Therapy options:  Physical therapy treatment to continue  Planned therapy interventions:  Therapeutic Exercise (CPT 11133), Manual Therapy (CPT 78759) and E Stim Unattended (CPT 42879)  Other planned therapy interventions:  Possibly dry needling  Frequency:  2x week  Duration in weeks:  4  Discussed with:  Patient      Functional Limitation G-Codes and Severity Modifiers  Quickdash General Total Score: 77.27             Referring provider co-signature:  I have reviewed this plan of care and my co-signature certifies the need for services.      Physician Signature: ________________________________ Date: ______________

## 2018-06-19 DIAGNOSIS — S40.012D CONTUSION OF LEFT SHOULDER, SUBSEQUENT ENCOUNTER: ICD-10-CM

## 2018-06-22 ENCOUNTER — PHYSICAL THERAPY (OUTPATIENT)
Dept: PHYSICAL THERAPY | Facility: REHABILITATION | Age: 28
End: 2018-06-22
Attending: PREVENTIVE MEDICINE
Payer: COMMERCIAL

## 2018-06-22 DIAGNOSIS — S40.012D: ICD-10-CM

## 2018-06-22 DIAGNOSIS — S40.012D CONTUSION OF LEFT SHOULDER, SUBSEQUENT ENCOUNTER: ICD-10-CM

## 2018-06-22 PROCEDURE — 97110 THERAPEUTIC EXERCISES: CPT

## 2018-06-22 PROCEDURE — 97014 ELECTRIC STIMULATION THERAPY: CPT

## 2018-06-22 NOTE — OP THERAPY DAILY TREATMENT
Outpatient Physical Therapy  DAILY TREATMENT     [facility] Physical [address].  [address]  [place] [address]  Phone:  [phone]  Fax:  [phone]    Date: 06/22/2018    Patient: Veronica Kirby  YOB: 1990  MRN: [id number]     Time Calculation  Start time: 0415  Stop time: 0453 Time Calculation (min): 38 minutes     Chief Complaint: shoulder and neck pain  Visit #: 2    SUBJECTIVE:  Pt has been doing some exercises in the hot tub and it helps loosen it.     OBJECTIVE:      Therapeutic Exercises (CPT 80850):     1. Pulleys , 4 minutes    2. Ball rolls on table into flexion    3. Ball rolls into abduction    4. Foam roller on the wall: pec stretch, alt flexion    Therapeutic Treatments and Modalities:     1. E Stim Unattended (CPT 97988), IFC and MHP to left upper trap and shoulder   pt unable to tolerate passive mobilization or soft tissue work without flinching.    Time-based treatments/modalities:  Therapeutic exercise minutes (CPT 72583): 22 minutes       ASSESSMENT:   pt's shoulder arom is improved today, but still high pain behavior.     PLAN/RECOMMENDATIONS:     Pt sees MD next week to discuss MRI.

## 2018-06-27 ENCOUNTER — APPOINTMENT (OUTPATIENT)
Dept: PHYSICAL THERAPY | Facility: REHABILITATION | Age: 28
End: 2018-06-27
Attending: PREVENTIVE MEDICINE
Payer: COMMERCIAL

## 2018-06-29 ENCOUNTER — OCCUPATIONAL MEDICINE (OUTPATIENT)
Dept: OCCUPATIONAL MEDICINE | Facility: CLINIC | Age: 28
End: 2018-06-29
Payer: COMMERCIAL

## 2018-06-29 VITALS
TEMPERATURE: 97.8 F | SYSTOLIC BLOOD PRESSURE: 114 MMHG | WEIGHT: 144 LBS | DIASTOLIC BLOOD PRESSURE: 72 MMHG | HEART RATE: 97 BPM | BODY MASS INDEX: 29.03 KG/M2 | HEIGHT: 59 IN | OXYGEN SATURATION: 99 %

## 2018-06-29 DIAGNOSIS — S40.012D CONTUSION OF LEFT SHOULDER, SUBSEQUENT ENCOUNTER: ICD-10-CM

## 2018-06-29 PROCEDURE — 99212 OFFICE O/P EST SF 10 MIN: CPT | Performed by: PREVENTIVE MEDICINE

## 2018-06-29 ASSESSMENT — PAIN SCALES - GENERAL: PAINLEVEL: 2=MINIMAL-SLIGHT

## 2018-06-29 NOTE — LETTER
[address],   [address] [address]  Phone:  [phone] - Fax:  [phone]   [facility] Progress Report and Disability Certification  Date of Service: 6/29/2018   No Show:  No  Date / Time of Next Visit: 7/20/18 @9:15 AM   Claim Information   Patient Name: Veronica Kirby  Claim Number:     Employer: [employer]  Date of Injury: 5/29/2018     Insurer / TPA: [employer]  ID / SSN:     Occupation: [employer]   Diagnosis: The encounter diagnosis was Contusion of left shoulder, subsequent encounter.    Medical Information   Related to Industrial Injury? Yes    Subjective Complaints:  DOI 5/29/2018: 27 yo female presents with left shoulder injury. She bent down and on her way back up hit her left shoulder on a large benjamin. X-rays of the left shoulder were negative. Patient is noted gradual improvement in left shoulder pain with physical therapy. She has attended 2 sessions. She has one visit per week schedule. She continues of pain around the upper trapezius. She has range of motion of his somewhat improved as well.   Objective Findings: Left shoulder: No gross deformity. Diffuse tenderness over the lateral shoulder and upper trapezius. Slight decrease in range of motion with flexion and abduction.    Pre-Existing Condition(s):     Assessment:   Condition Improved    Status: Additional Care Required  Permanent Disability:No    Plan:      Diagnostics:      Comments:  MRI Left Shoulder: Normal MRI of left shoulder  Continue physical therapy  Ibuprofen as needed for pain  Restricted duty  Follow-up 4 weeks    Disability Information   Status: Released to Restricted Duty    From:  6/29/2018  Through: 7/31/2018 Restrictions are: Temporary   Physical Restrictions   Sitting:    Standing:    Stooping:    Bending:      Squatting:    Walking:    Climbing:    Pushing:      Pulling:    Other:    Reaching  Above Shoulder (L):   Reaching Above Shoulder (R):       Reaching Below Shoulder (L):    Reaching Below Shoulder (R):      Not to exceed Weight Limits   Carrying(hrs):   Weight Limit(lb): < or = to 50 pounds Lifting(hrs):   Weight  Limit(lb): < or = to 50 pounds   Comments:      Repetitive Actions   Hands: i.e. Fine Manipulations from Grasping:     Feet: i.e. Operating Foot Controls:     Driving / Operate Machinery:     Physician Name: Mik Mendez D.O. Physician Signature: MIK Penaloza D.O. e-Signature: Dr. Rojelio Whiting, Medical Director   Clinic Name / Location: [address],   [address] [place] [address] Clinic Phone Number: Dept: [phone]   Appointment Time: 9:55 Am Visit Start Time: 9:40 AM   Check-In Time:  9:37 Am Visit Discharge Time:  10:00 AM   Original-Treating Physician or Chiropractor    Page 2-Insurer/TPA    Page 3-Employer    Page 4-Employee

## 2018-06-29 NOTE — PROGRESS NOTES
"Subjective:      Veronica Kirby is a 28 y.o. female who presents with Follow-Up ( DOI 05/29/2018 - L Shoulder - better - pro room 1)      DOI 5/29/2018: 27 yo female presents with left shoulder injury. She bent down and on her way back up hit her left shoulder on a large benjamin. X-rays of the left shoulder were negative. Patient is noted gradual improvement in left shoulder pain with physical therapy. She has attended 2 sessions. She has one visit per week schedule. She continues of pain around the upper trapezius. She has range of motion of his somewhat improved as well.     HPI    ROS       Objective:     /72   Pulse 97   Temp 36.6 °C (97.8 °F)   Ht 1.499 m (4' 11\")   Wt 65.3 kg (144 lb)   SpO2 99%   BMI 29.08 kg/m²      Physical Exam    Left shoulder: No gross deformity. Diffuse tenderness over the lateral shoulder and upper trapezius. Slight decrease in range of motion with flexion and abduction.        Assessment/Plan:     1. Contusion of left shoulder, subsequent encounter  MRI Left Shoulder: Normal MRI of left shoulder  Continue physical therapy  Ibuprofen as needed for pain  Restricted duty  Follow-up 4 weeks      "

## 2018-07-06 ENCOUNTER — PHYSICAL THERAPY (OUTPATIENT)
Dept: PHYSICAL THERAPY | Facility: REHABILITATION | Age: 28
End: 2018-07-06
Attending: PREVENTIVE MEDICINE
Payer: COMMERCIAL

## 2018-07-06 DIAGNOSIS — S40.012D: ICD-10-CM

## 2018-07-06 DIAGNOSIS — S40.012D CONTUSION OF LEFT SHOULDER, SUBSEQUENT ENCOUNTER: ICD-10-CM

## 2018-07-06 PROCEDURE — 97110 THERAPEUTIC EXERCISES: CPT

## 2018-07-06 NOTE — OP THERAPY DAILY TREATMENT
Outpatient Physical Therapy  DAILY TREATMENT     [facility] Physical Therapy [address]  [address]  [place] [address]  Phone:  [phone]  Fax:  [phone]    Date: 07/06/2018    Patient: Veronica Kirby  YOB: 1990  MRN: [id number]     Time Calculation  Start time: 0945  Stop time: 1017 Time Calculation (min): 32 minutes     Chief Complaint: shoulder pain  Visit #: 3    SUBJECTIVE:  Pt states she's feeling better with less shoulder pain.     OBJECTIVE:  Patient's AROM WNL'S today.     Therapeutic Exercises (CPT 67270):     1. Pulleys, 2    2. UBE , 2    3. Mid rows     4. Pull downs     5. ER/IR    6. Box exercise    7. 2# serratus punch      Time-based treatments/modalities:  Therapeutic exercise minutes (CPT 59192): 32 minutes         ASSESSMENT:   pt's AROM much improved today and she tolerated resistive exercises well.     PLAN/RECOMMENDATIONS:   Continue to progress strength and improve posture.

## 2018-07-13 ENCOUNTER — PHYSICAL THERAPY (OUTPATIENT)
Dept: PHYSICAL THERAPY | Facility: REHABILITATION | Age: 28
End: 2018-07-13
Attending: PREVENTIVE MEDICINE
Payer: COMMERCIAL

## 2018-07-13 DIAGNOSIS — S40.012D CONTUSION OF LEFT SHOULDER, SUBSEQUENT ENCOUNTER: ICD-10-CM

## 2018-07-13 DIAGNOSIS — S40.012D: ICD-10-CM

## 2018-07-13 PROCEDURE — 97530 THERAPEUTIC ACTIVITIES: CPT

## 2018-07-13 NOTE — OP THERAPY DAILY TREATMENT
Outpatient Physical Therapy  DAILY TREATMENT     [facility] Physical Therapy [address]  [address]  [place] [address]  Phone:  [phone]  Fax:  [phone]    Date: 07/13/2018    Patient: Veronica Kirby  YOB: 1990  MRN: [id number]     Time Calculation  Start time: 1015  Stop time: 1047 Time Calculation (min): 32 minutes     Chief Complaint: shoulder pain  Visit #: 4    SUBJECTIVE:  pt's tired, she's unable to sleep much . Only mild soreness at times. she's doing some martial arts forms.     OBJECTIVE:  Flexion= 158 degrees. HBB wnl's.     Therapeutic Exercises (CPT 99047):     1. Pulleys, 4 minutes    2. UBE , 4 minutes.     3. Mid rows     4. Pull downs , 2 x 12    5. ER/IR, 2 x 12    6. Horizontal abduction, 3 x 10    7. 2# serratus punch, 2 x 10    8. Foam roller: alt flexion, pec stretch, snow angels, 3 x 10      Time-based treatments/modalities:  Therapeutic activity minutes (CPT 40453): 32 minutes       ASSESSMENT:   pt's doing excellent overall with good ROM and improved strength.     PLAN/RECOMMENDATIONS:      continue to progress home exercises.

## 2018-07-20 ENCOUNTER — OCCUPATIONAL MEDICINE (OUTPATIENT)
Dept: OCCUPATIONAL MEDICINE | Facility: CLINIC | Age: 28
End: 2018-07-20
Payer: COMMERCIAL

## 2018-07-20 VITALS
HEIGHT: 59 IN | HEART RATE: 90 BPM | TEMPERATURE: 97.2 F | WEIGHT: 144 LBS | DIASTOLIC BLOOD PRESSURE: 72 MMHG | SYSTOLIC BLOOD PRESSURE: 102 MMHG | BODY MASS INDEX: 29.03 KG/M2 | OXYGEN SATURATION: 98 % | RESPIRATION RATE: 16 BRPM

## 2018-07-20 DIAGNOSIS — S40.012D CONTUSION OF LEFT SHOULDER, SUBSEQUENT ENCOUNTER: ICD-10-CM

## 2018-07-20 PROCEDURE — 99212 OFFICE O/P EST SF 10 MIN: CPT | Performed by: PREVENTIVE MEDICINE

## 2018-07-20 ASSESSMENT — PAIN SCALES - GENERAL: PAINLEVEL: NO PAIN

## 2018-07-20 NOTE — PROGRESS NOTES
"Subjective:      Veronica Kirby is a 28 y.o. female who presents with Follow-Up (WC DOI 5/29/18 L Shoulder better room #1)      DOI 5/29/2018: 29 yo female presents with left shoulder injury. She bent down and on her way back up hit her left shoulder on a large benjamin. X-rays of the left shoulder were negative.  Patient overall feels much improved.  She states physical therapy has helped.  Ready to be released.     HPI    ROS       Objective:     /72   Pulse 90   Temp 36.2 °C (97.2 °F)   Resp 16   Ht 1.499 m (4' 11\")   Wt 65.3 kg (144 lb)   SpO2 98%   BMI 29.08 kg/m²      Physical Exam    Left shoulder: No gross deformity.  No tenderness. Full ROM.        Assessment/Plan:     1. Contusion of left shoulder, subsequent encounter  Released from care  Full duty  Follow-up as needed        "

## 2018-07-20 NOTE — LETTER
[address],   [address] [place] [address]  Phone:  [phone] - Fax:  [phone]   [facility] Progress Report and Disability Certification  Date of Service: 7/20/2018   No Show:  No  Date / Time of Next Visit:  Release from care   Claim Information   Patient Name: Veronica Kirby  Claim Number:     Employer: [employer]  Date of Injury: 5/29/2018     Insurer / TPA: [employer]  ID / SSN:     Occupation: [employer]   Diagnosis: The encounter diagnosis was Contusion of left shoulder, subsequent encounter.    Medical Information   Related to Industrial Injury? Yes    Subjective Complaints:  DOI 5/29/2018: 27 yo female presents with left shoulder injury. She bent down and on her way back up hit her left shoulder on a large benjamin. X-rays of the left shoulder were negative.  Patient overall feels much improved.  She states physical therapy has helped.  Ready to be released.   Objective Findings: Left shoulder: No gross deformity.  No tenderness. Full ROM.    Pre-Existing Condition(s):     Assessment:   Condition Improved    Status: Discharged /  MMI  Permanent Disability:No    Plan:      Diagnostics:      Comments:  Released from care  Full duty  Follow-up as needed    Disability Information   Status: Released to Full Duty    From:  7/20/2018  Through:   Restrictions are:     Physical Restrictions   Sitting:    Standing:    Stooping:    Bending:      Squatting:    Walking:    Climbing:    Pushing:      Pulling:    Other:    Reaching Above Shoulder (L):   Reaching Above Shoulder (R):       Reaching Below Shoulder (L):    Reaching Below Shoulder (R):      Not to exceed Weight Limits   Carrying(hrs):   Weight Limit(lb):   Lifting(hrs):   Weight  Limit(lb):     Comments:      Repetitive Actions   Hands: i.e. Fine Manipulations from Grasping:     Feet: i.e. Operating Foot Controls:     Driving / Operate  Machinery:     Physician Name: Mik Mendez D.O. Physician Signature: MIK Penaloza D.O. e-Signature: Dr. Rojelio Whiting, Medical Director   Clinic Name / Location: [address],   [address]  [place] [address] Clinic Phone Number: Dept: [phone]   Appointment Time: 9:15 Am Visit Start Time: 9:13 AM   Check-In Time:  8:56 Am Visit Discharge Time:  9:26 AM   Original-Treating Physician or Chiropractor    Page 2-Insurer/TPA    Page 3-Employer    Page 4-Employee

## 2018-07-25 ENCOUNTER — APPOINTMENT (OUTPATIENT)
Dept: PHYSICAL THERAPY | Facility: REHABILITATION | Age: 28
End: 2018-07-25
Attending: PREVENTIVE MEDICINE
Payer: COMMERCIAL

## 2018-07-27 ENCOUNTER — TELEPHONE (OUTPATIENT)
Dept: PHYSICAL THERAPY | Facility: REHABILITATION | Age: 28
End: 2018-07-27

## 2018-07-27 NOTE — OP THERAPY DISCHARGE SUMMARY
Outpatient Physical Therapy  DISCHARGE SUMMARY NOTE      [facility] [address]  [address]  [place] [address]  Phone:  [phone]  Fax:  [phone]    Date of Visit: 07/27/2018    Patient: Veronica Kirby  YOB: 1990  MRN: [id number]     Referring Provider: No referring provider defined for this encounter.   Referring Diagnosis No admission diagnoses are documented for this encounter.     Physical Therapy Occurrence Codes    Date of onset of impairment:  5/29/18   Date physical therapy care plan established or reviewed:  6/15/18   Date physical therapy treatment started:  6/15/18        Your patient is being discharged from Physical Therapy with the following comments:   · Goals met    Comments:    Pt was seen for 4 sessions of physical therapy for a shoulder contusion. She no showed 2 sessions.   I last saw her on 7-13-18 and she tolerated full ROM and strengthening exercises quite well.     Pt called and cancelled her follow ups bc she's been released from care.       Recommendations:  She will be dc'd to a home program at this time.    Felicia Barber, PT    Date: 7/27/2018

## 2018-08-01 ENCOUNTER — APPOINTMENT (OUTPATIENT)
Dept: PHYSICAL THERAPY | Facility: REHABILITATION | Age: 28
End: 2018-08-01
Attending: PREVENTIVE MEDICINE
Payer: MEDICARE

## 2018-08-08 ENCOUNTER — APPOINTMENT (OUTPATIENT)
Dept: PHYSICAL THERAPY | Facility: REHABILITATION | Age: 28
End: 2018-08-08
Attending: PREVENTIVE MEDICINE
Payer: MEDICARE

## 2019-01-15 ENCOUNTER — HOSPITAL ENCOUNTER (OUTPATIENT)
Dept: LAB | Facility: MEDICAL CENTER | Age: 29
End: 2019-01-15
Attending: EMERGENCY MEDICINE
Payer: COMMERCIAL

## 2019-01-15 ENCOUNTER — OFFICE VISIT (OUTPATIENT)
Dept: URGENT CARE | Facility: PHYSICIAN GROUP | Age: 29
End: 2019-01-15
Payer: COMMERCIAL

## 2019-01-15 ENCOUNTER — TELEPHONE (OUTPATIENT)
Dept: SCHEDULING | Facility: IMAGING CENTER | Age: 29
End: 2019-01-15

## 2019-01-15 ENCOUNTER — HOSPITAL ENCOUNTER (OUTPATIENT)
Dept: RADIOLOGY | Facility: MEDICAL CENTER | Age: 29
End: 2019-01-15
Attending: EMERGENCY MEDICINE
Payer: COMMERCIAL

## 2019-01-15 VITALS
OXYGEN SATURATION: 96 % | WEIGHT: 145 LBS | RESPIRATION RATE: 12 BRPM | BODY MASS INDEX: 29.23 KG/M2 | HEART RATE: 86 BPM | HEIGHT: 59 IN | DIASTOLIC BLOOD PRESSURE: 72 MMHG | SYSTOLIC BLOOD PRESSURE: 124 MMHG | TEMPERATURE: 97.5 F

## 2019-01-15 DIAGNOSIS — M13.0 POLYARTHRITIS: ICD-10-CM

## 2019-01-15 LAB
BASOPHILS # BLD AUTO: 0.5 % (ref 0–1.8)
BASOPHILS # BLD: 0.03 K/UL (ref 0–0.12)
EOSINOPHIL # BLD AUTO: 0.25 K/UL (ref 0–0.51)
EOSINOPHIL NFR BLD: 3.8 % (ref 0–6.9)
ERYTHROCYTE [DISTWIDTH] IN BLOOD BY AUTOMATED COUNT: 41 FL (ref 35.9–50)
ERYTHROCYTE [SEDIMENTATION RATE] IN BLOOD BY WESTERGREN METHOD: 8 MM/HOUR (ref 0–20)
HCT VFR BLD AUTO: 44.1 % (ref 37–47)
HGB BLD-MCNC: 15 G/DL (ref 12–16)
IMM GRANULOCYTES # BLD AUTO: 0.02 K/UL (ref 0–0.11)
IMM GRANULOCYTES NFR BLD AUTO: 0.3 % (ref 0–0.9)
LYMPHOCYTES # BLD AUTO: 1.96 K/UL (ref 1–4.8)
LYMPHOCYTES NFR BLD: 29.9 % (ref 22–41)
MCH RBC QN AUTO: 30.4 PG (ref 27–33)
MCHC RBC AUTO-ENTMCNC: 34 G/DL (ref 33.6–35)
MCV RBC AUTO: 89.5 FL (ref 81.4–97.8)
MONOCYTES # BLD AUTO: 0.39 K/UL (ref 0–0.85)
MONOCYTES NFR BLD AUTO: 5.9 % (ref 0–13.4)
NEUTROPHILS # BLD AUTO: 3.91 K/UL (ref 2–7.15)
NEUTROPHILS NFR BLD: 59.6 % (ref 44–72)
NRBC # BLD AUTO: 0 K/UL
NRBC BLD-RTO: 0 /100 WBC
PLATELET # BLD AUTO: 222 K/UL (ref 164–446)
PMV BLD AUTO: 10.5 FL (ref 9–12.9)
RBC # BLD AUTO: 4.93 M/UL (ref 4.2–5.4)
RHEUMATOID FACT SER IA-ACNC: <10 IU/ML (ref 0–14)
URATE SERPL-MCNC: 3.3 MG/DL (ref 1.9–8.2)
WBC # BLD AUTO: 6.6 K/UL (ref 4.8–10.8)

## 2019-01-15 PROCEDURE — 36415 COLL VENOUS BLD VENIPUNCTURE: CPT

## 2019-01-15 PROCEDURE — 85652 RBC SED RATE AUTOMATED: CPT

## 2019-01-15 PROCEDURE — 86038 ANTINUCLEAR ANTIBODIES: CPT

## 2019-01-15 PROCEDURE — 84550 ASSAY OF BLOOD/URIC ACID: CPT

## 2019-01-15 PROCEDURE — 73560 X-RAY EXAM OF KNEE 1 OR 2: CPT | Mod: LT

## 2019-01-15 PROCEDURE — 85025 COMPLETE CBC W/AUTO DIFF WBC: CPT

## 2019-01-15 PROCEDURE — 99214 OFFICE O/P EST MOD 30 MIN: CPT | Performed by: EMERGENCY MEDICINE

## 2019-01-15 PROCEDURE — 86431 RHEUMATOID FACTOR QUANT: CPT

## 2019-01-15 ASSESSMENT — ENCOUNTER SYMPTOMS
FEVER: 0
CHILLS: 0
FALLS: 0
FOCAL WEAKNESS: 0
SPEECH CHANGE: 0
ABDOMINAL PAIN: 0
NERVOUS/ANXIOUS: 0
SENSORY CHANGE: 0
VOMITING: 0
STRIDOR: 0
EYE DISCHARGE: 0
EYE REDNESS: 0
DIZZINESS: 0
NAUSEA: 0

## 2019-01-15 NOTE — PROGRESS NOTES
"Subjective:      Veronica Kirby is a 29 y.o. female who presents with No chief complaint on file.            HPI  Patient is a 29-year-old female complaining of knee pain bilaterally.  Patient is also a smoker. Did inj right knee and went through PT without help per the patient  MRI normal 5/17  Subsequently injured shoulder on W/C with  Neg MRI.    Patient states she cannot do her job needs a work note for today and tomorrow as well as would like to be on crutches.    PMH:  has a past medical history of Psychiatric disorder.  MEDS:   Current Outpatient Prescriptions:   •  ascorbic acid (ASCORBIC ACID) 500 MG Tab, Take 500 mg by mouth every day., Disp: , Rfl:   •  MELATONIN PO, Take  by mouth., Disp: , Rfl:   •  IRON PO, Take 2 Tabs by mouth every day., Disp: , Rfl:   •  naproxen (NAPROSYN) 500 MG Tab, Take 500 mg by mouth as needed. Indications: Mild to Moderate Pain, Disp: , Rfl:   ALLERGIES:   Allergies   Allergen Reactions   • Pcn [Penicillins] Anaphylaxis   • Sulfa Drugs Anaphylaxis   • Codeine Unspecified     Pt states \"I was a childhood allergie\".     SURGHX: History reviewed. No pertinent surgical history.  SOCHX:  reports that she has been smoking.  She has been smoking about 0.50 packs per day. She has never used smokeless tobacco. She reports that she drinks alcohol. She reports that she does not use drugs.  FH: Reviewed with patient, not pertinent to this visit.   Review of Systems   Constitutional: Negative for chills and fever.   Eyes: Negative for discharge and redness.   Respiratory: Negative for stridor.    Cardiovascular: Negative for chest pain.   Gastrointestinal: Negative for abdominal pain, nausea and vomiting.   Musculoskeletal: Positive for joint pain. Negative for falls.        Patient complains of bilateral knee pain left knee worse than the right.   Skin: Negative for rash.   Neurological: Negative for dizziness, sensory change, speech change and focal weakness.   Psychiatric/Behavioral: " "The patient is not nervous/anxious.           Objective:     Blood pressure 124/72, pulse 86, temperature 36.4 °C (97.5 °F), temperature source Tympanic, resp. rate 12, height 1.499 m (4' 11\"), weight 65.8 kg (145 lb), last menstrual period 01/08/2019, SpO2 96 %, not currently breastfeeding.      Physical Exam   Constitutional: She appears well-developed and well-nourished.   HENT:   Head: Normocephalic and atraumatic.   Right Ear: External ear normal.   Neck: Normal range of motion.   Cardiovascular: Normal rate.    Pulmonary/Chest: Effort normal and breath sounds normal.   Musculoskeletal: Normal range of motion. She exhibits tenderness. She exhibits no edema or deformity.   Patient's left knee has joint line tenderness over both medial and lateral joint lines.  She has no medial abduction tenderness nor does she have any lateral abduction tenderness her ligaments appear to be intact quadriceps mechanisms is intact although she complains of discomfort over her patella.  There is no warmth or redness.  Her right knee is similar in terms of areas of discomfort to markedly lesser degree.  Patient ambulates favoring her left   Neurological: She is alert. Coordination normal.   Skin: Skin is warm. She is not diaphoretic. No erythema.   Psychiatric: She has a normal mood and affect. Her behavior is normal.   Vitals reviewed.            Knee xray normal x-rays of left knee.    Crutches for from 4 foot 10.    CBC, uric acid, FERN, RA, sed rate,  Assessment/Plan:     Diagnosis: Acute polyarthralgia                         Left knee pain                        Tobacco abuse.    Blood work had been ordered  CBC, Sed Rate, FERN, RA and Uric acid.   Pt to f/u with Dr. Schaffer 2/14 patient will be given a set of crutches to ambulate on she has a brace already for her left knee.  Patient is given a work note for today and tomorrow.  I will call him with the results she is been given a diagnosis of most likely osteoarthritis the lab " tests will help rule out other etiologies.

## 2019-01-15 NOTE — LETTER
January 15, 2019        Veronica Kirby  [address]  [place] [address]        Dear Veronica:    Please ask to be excused from work last night and tonight  for medical reasons.    If you have any questions or concerns, please don't hesitate to call.        Sincerely,        Sylvester Rick M.D.    Electronically Signed

## 2019-01-16 ENCOUNTER — TELEPHONE (OUTPATIENT)
Dept: URGENT CARE | Facility: PHYSICIAN GROUP | Age: 29
End: 2019-01-16

## 2019-01-18 LAB — NUCLEAR IGG SER QL IA: NORMAL

## 2019-02-14 ENCOUNTER — OFFICE VISIT (OUTPATIENT)
Dept: MEDICAL GROUP | Facility: PHYSICIAN GROUP | Age: 29
End: 2019-02-14
Payer: COMMERCIAL

## 2019-02-14 VITALS
HEART RATE: 88 BPM | TEMPERATURE: 98.4 F | WEIGHT: 142 LBS | SYSTOLIC BLOOD PRESSURE: 110 MMHG | BODY MASS INDEX: 29.81 KG/M2 | RESPIRATION RATE: 18 BRPM | OXYGEN SATURATION: 97 % | HEIGHT: 58 IN | DIASTOLIC BLOOD PRESSURE: 78 MMHG

## 2019-02-14 DIAGNOSIS — B00.50 HSV (HERPES SIMPLEX VIRUS) WITH OPHTHALMIC COMPLICATIONS: ICD-10-CM

## 2019-02-14 DIAGNOSIS — M25.562 BILATERAL CHRONIC KNEE PAIN: ICD-10-CM

## 2019-02-14 DIAGNOSIS — H47.10 OPTIC NERVE SWELLING: ICD-10-CM

## 2019-02-14 DIAGNOSIS — M25.551 RIGHT HIP PAIN: ICD-10-CM

## 2019-02-14 DIAGNOSIS — M25.561 BILATERAL CHRONIC KNEE PAIN: ICD-10-CM

## 2019-02-14 DIAGNOSIS — G89.29 BILATERAL CHRONIC KNEE PAIN: ICD-10-CM

## 2019-02-14 PROBLEM — S40.012A CONTUSION OF LEFT SHOULDER: Status: RESOLVED | Noted: 2018-06-13 | Resolved: 2019-02-14

## 2019-02-14 PROCEDURE — 99214 OFFICE O/P EST MOD 30 MIN: CPT | Performed by: FAMILY MEDICINE

## 2019-02-14 RX ORDER — ACYCLOVIR 400 MG/1
400 TABLET ORAL 2 TIMES DAILY
Qty: 180 TAB | Refills: 3 | Status: SHIPPED | OUTPATIENT
Start: 2019-02-14 | End: 2020-03-05 | Stop reason: SDUPTHER

## 2019-02-14 ASSESSMENT — PATIENT HEALTH QUESTIONNAIRE - PHQ9: CLINICAL INTERPRETATION OF PHQ2 SCORE: 0

## 2019-02-14 NOTE — PROGRESS NOTES
"cc: Bilateral knee and right hip pain.      Subjective:     Veronica Kirby is a 29 y.o. female presenting for the following:     Patient had a fall onto her knees when she was 13 and has had difficulty with bilateral knee pain since then. Both are painful at times, right seems to be slightly worse.   Her knees will start hurting her at work and will limit how much she walks.   She does notice that her knees hurt most severely going downstairs and with walking after prolonged sitting.  She has not had the knees give out on her.  They do occasionally get swollen but they are not bad today.    She also has had difficulty with upper, lateral right hip pain.  She did have a CT scan of this in the past that did not show any severe abnormalities.  That also can become bad with prolonged exercise.    Patient did have a shoulder injury in 2018, this has improved and no further shoulder pain.     She does not have any morning stiffness, recurrent rashes, severe fatigue.    Review of systems:  All others reviewed and are negative.       Current Outpatient Prescriptions:   •  acyclovir (ZOVIRAX) 400 MG tablet, Take 1 Tab by mouth 2 times a day., Disp: 180 Tab, Rfl: 3  •  ascorbic acid (ASCORBIC ACID) 500 MG Tab, Take 500 mg by mouth every day., Disp: , Rfl:   •  MELATONIN PO, Take  by mouth., Disp: , Rfl:   •  IRON PO, Take 2 Tabs by mouth every day., Disp: , Rfl:   •  naproxen (NAPROSYN) 500 MG Tab, Take 500 mg by mouth as needed. Indications: Mild to Moderate Pain, Disp: , Rfl:     Allergies, past medical history, past surgical history, family history, social history reviewed and updated    Objective:     Vitals: /78 (BP Location: Right arm, Patient Position: Sitting, BP Cuff Size: Adult)   Pulse 88   Temp 36.9 °C (98.4 °F) (Temporal)   Resp 18   Ht 1.473 m (4' 10\")   Wt 64.4 kg (142 lb)   SpO2 97%   BMI 29.68 kg/m²   General: Alert, pleasant, NAD  HEENT:    Neck supple.  No thyromegaly or masses palpated. No " cervical or supraclavicular lymphadenopathy.  Heart: Regular rate and rhythm.  S1 and S2 normal.  No murmurs appreciated.  Respiratory: Normal respiratory effort.  Clear to auscultation bilaterally.  Abdomen: Non-distended, soft  Skin: Warm, dry, no rashes.  Musculoskeletal: Gait is normal.  Moves all extremities well.  Bilateral knees without swelling or warmth.  Full range of motion and full strength.  No laxity.  Extremities: No leg edema.    Neurological: No tremors, sensation grossly intact,  tone/strength normal, gait is normal, CN2-12 grossly intact  Psych:  Affect is normal, judgement is good, memory is intact, grooming is appropriate.    Assessment/Plan:     Veronica was seen today for annual exam.    Diagnoses and all orders for this visit:    Bilateral chronic knee pain/Right hip pain: Chronic bilateral knee pain and occasional right lateral hip pain.  Normal exam today. ?PFPS.  On past imaging no indication for surgery.  Will refer to sports medicine.  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  She has been put on twice daily acyclovir for suppression.  She does not have any side effect with this medication.  We will refill this today.  She was also told by the optometrist at [facility] last year that her left optic nerve was swollen and she should see ophthalmology.  She has not noticed any worsening in vision in this eye or eye pain.  -     REFERRAL TO OPHTHALMOLOGY    Other orders  -     acyclovir (ZOVIRAX) 400 MG tablet; Take 1 Tab by mouth 2 times a day.    Return in about 2 weeks (around 2/28/2019).-Patient mentions that in the past she has had some episodes of lightheadedness.  She has not had any syncope, chest pain, palpitations.  Patient instructed to make an appointment to discuss this problem.

## 2019-02-28 ENCOUNTER — OFFICE VISIT (OUTPATIENT)
Dept: MEDICAL GROUP | Facility: PHYSICIAN GROUP | Age: 29
End: 2019-02-28
Payer: COMMERCIAL

## 2019-02-28 ENCOUNTER — OFFICE VISIT (OUTPATIENT)
Dept: MEDICAL GROUP | Facility: CLINIC | Age: 29
End: 2019-02-28
Payer: COMMERCIAL

## 2019-02-28 VITALS
HEART RATE: 96 BPM | SYSTOLIC BLOOD PRESSURE: 110 MMHG | DIASTOLIC BLOOD PRESSURE: 70 MMHG | HEIGHT: 58 IN | TEMPERATURE: 98.4 F | OXYGEN SATURATION: 99 % | WEIGHT: 142 LBS | BODY MASS INDEX: 29.81 KG/M2 | RESPIRATION RATE: 14 BRPM

## 2019-02-28 VITALS
HEIGHT: 58 IN | RESPIRATION RATE: 18 BRPM | DIASTOLIC BLOOD PRESSURE: 80 MMHG | WEIGHT: 142 LBS | BODY MASS INDEX: 29.81 KG/M2 | HEART RATE: 64 BPM | SYSTOLIC BLOOD PRESSURE: 120 MMHG | OXYGEN SATURATION: 99 % | TEMPERATURE: 98 F

## 2019-02-28 DIAGNOSIS — G89.29 CHRONIC PAIN OF BOTH KNEES: ICD-10-CM

## 2019-02-28 DIAGNOSIS — B00.50 HSV (HERPES SIMPLEX VIRUS) WITH OPHTHALMIC COMPLICATIONS: ICD-10-CM

## 2019-02-28 DIAGNOSIS — H47.10 OPTIC NERVE EDEMA: ICD-10-CM

## 2019-02-28 DIAGNOSIS — M25.551 HIP PAIN, RIGHT: ICD-10-CM

## 2019-02-28 DIAGNOSIS — R42 EPISODIC LIGHTHEADEDNESS: ICD-10-CM

## 2019-02-28 DIAGNOSIS — F17.200 SMOKER: ICD-10-CM

## 2019-02-28 DIAGNOSIS — M25.561 CHRONIC PAIN OF BOTH KNEES: ICD-10-CM

## 2019-02-28 DIAGNOSIS — M25.562 CHRONIC PAIN OF BOTH KNEES: ICD-10-CM

## 2019-02-28 PROCEDURE — 20610 DRAIN/INJ JOINT/BURSA W/O US: CPT | Mod: LT | Performed by: FAMILY MEDICINE

## 2019-02-28 PROCEDURE — 99203 OFFICE O/P NEW LOW 30 MIN: CPT | Mod: 25 | Performed by: FAMILY MEDICINE

## 2019-02-28 PROCEDURE — 99214 OFFICE O/P EST MOD 30 MIN: CPT | Performed by: FAMILY MEDICINE

## 2019-02-28 RX ORDER — TRIAMCINOLONE ACETONIDE 40 MG/ML
40 INJECTION, SUSPENSION INTRA-ARTICULAR; INTRAMUSCULAR ONCE
Status: COMPLETED | OUTPATIENT
Start: 2019-02-28 | End: 2019-02-28

## 2019-02-28 RX ADMIN — TRIAMCINOLONE ACETONIDE 40 MG: 40 INJECTION, SUSPENSION INTRA-ARTICULAR; INTRAMUSCULAR at 15:11

## 2019-02-28 ASSESSMENT — ENCOUNTER SYMPTOMS
NAUSEA: 0
VOMITING: 0
CHILLS: 0
HEADACHES: 1
SHORTNESS OF BREATH: 0
DIZZINESS: 1
FEVER: 0

## 2019-02-28 NOTE — PROGRESS NOTES
"cc: lightheadedness      Subjective:     Veronica Kirby is a 29 y.o. female presenting for the following:     Lightheaded episodes: About 2 times a month, patient will have difficulty with transient lightheadedness. This occurs most often when she is bending down but sometimes happens when she is walking around the house in the morning before breakfast. The lightheaded sensation only lasts a few seconds and she feels completley normal afterwards.     She has felt a few beats of a fast heartbeat at night before but not often and never during the day or when she has symptoms. No CP, swelling, pulsatile tinnitus. She has never had syncope or near syncope. Never with syncope during exercise and no family history of sudden cardiac death.     Review of systems:  All others reviewed and are negative.       Current Outpatient Prescriptions:   •  acyclovir (ZOVIRAX) 400 MG tablet, Take 1 Tab by mouth 2 times a day., Disp: 180 Tab, Rfl: 3  •  ascorbic acid (ASCORBIC ACID) 500 MG Tab, Take 500 mg by mouth every day., Disp: , Rfl:   •  MELATONIN PO, Take  by mouth., Disp: , Rfl:   •  IRON PO, Take 2 Tabs by mouth every day., Disp: , Rfl:   •  naproxen (NAPROSYN) 500 MG Tab, Take 500 mg by mouth as needed. Indications: Mild to Moderate Pain, Disp: , Rfl:     Allergies, past medical history, past surgical history, family history, social history reviewed and updated    Objective:     Vitals: /80 (BP Location: Left arm, Patient Position: Sitting, BP Cuff Size: Adult)   Pulse 64   Temp 36.7 °C (98 °F) (Temporal)   Resp 18   Ht 1.473 m (4' 10\")   Wt 64.4 kg (142 lb)   SpO2 99%   BMI 29.68 kg/m²   General: Alert, pleasant, NAD  HEENT:  Neck supple. No bruit. No thyromegaly or masses palpated. No cervical or supraclavicular lymphadenopathy.  Heart: Regular rate and rhythm.  S1 and S2 normal.  No murmurs appreciated.  Respiratory: Normal respiratory effort.  Clear to auscultation bilaterally.  Extremities: No leg " edema.    EKG with normal sinus rhythm. No abnormalities.  No signs of hypertrophy or ischemia.     Assessment/Plan:     Veronica was seen today for dizziness.    Diagnoses and all orders for this visit:    Episodic lightheadedness: most likely a mild orthostatic hypotension. Symptoms not suggestive of HOCM, intermittent arhythmia, or arterial disease. Will check for renal function and thyroid dysfunction. Recent CBC without anemia.   -patient encouraged to increase water and electrolytes and to RTC if problem not improved.   -     EKG - Clinic Performed  -     Comp Metabolic Panel; Future  -     TSH; Future  -     FREE THYROXINE; Future    Optic nerve edema: recently seen by opthalmology and noted to have bilateral optic nerve edema and patient told to get referral to Neurophthalmology.   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

## 2019-02-28 NOTE — PROGRESS NOTES
Subjective:      Veronica Kirby is a 29 y.o. female who presents with Hip Pain (Right Hip Pain.) and Knee Pain (Bilateral knee pain.)     Referred by Raegan Schaffer M.D.  for evaluation of BILATERAL knee pain (right greater than left) as well as RIGHT hip pain    HPI   BILATERAL knee pain (right greater than left) as well as RIGHT hip pain  BILATERAL knee pain which began approximately 15-16 years ago after an accident while skiing, slipped forward  Continued intermittent swelling since then  And RIGHT hip pain which began approximately 2 years ago (back in 2017)    patellofemoral region  Burning  No radiation  Improved with rest and icing  Worse with ambulation  POSITIVE popping, as well as clicking without any mechanical locking  POSITIVE stiffness, particularly after sitting for long periods  Occasional night symptoms  Naproxen for pain, which helps him  POSITIVE swelling which is intermittent    She did undergo RIGHT knee injury back in 2017 after a workplace injury  Landed on a flexed knee after tripping on something at work  Underwent formal physical therapy back then which did NOT help    RIGHT hip pain predominantly at the posterior lateral aspect of the RIGHT hip/pelvis  Pain is sharp  Intermittent  Improved with rest  Worse with standing  Denies any prior hip issues  Occasional night symptoms, denies any pain with sleeping on the RIGHT side    She has NOT had any corticosteroid injections    Seen by PCP  She has had x-rays and MRI of the knee    Works at [employer], , standing for 12-hour shifts    Review of Systems   Constitutional: Negative for chills and fever.   Respiratory: Negative for shortness of breath.    Cardiovascular: Negative for chest pain.   Gastrointestinal: Negative for nausea and vomiting.   Neurological: Positive for dizziness and headaches.     PMH:  has a past medical history of Psychiatric disorder.  MEDS:   Current Outpatient Prescriptions:   •  acyclovir (ZOVIRAX) 400  "MG tablet, Take 1 Tab by mouth 2 times a day., Disp: 180 Tab, Rfl: 3  •  ascorbic acid (ASCORBIC ACID) 500 MG Tab, Take 500 mg by mouth every day., Disp: , Rfl:   •  MELATONIN PO, Take 1 Each by mouth PRN., Disp: , Rfl:   •  IRON PO, Take 2 Tabs by mouth every day., Disp: , Rfl:   •  naproxen (NAPROSYN) 500 MG Tab, Take 500 mg by mouth as needed. Indications: Mild to Moderate Pain, Disp: , Rfl:   ALLERGIES:   Allergies   Allergen Reactions   • Pcn [Penicillins] Anaphylaxis   • Sulfa Drugs Anaphylaxis   • Codeine Unspecified     Pt states \"I was a childhood allergie\".     SURGHX: History reviewed. No pertinent surgical history.  SOCHX:  reports that she has been smoking.  She has been smoking about 0.50 packs per day. She has never used smokeless tobacco. She reports that she drinks alcohol. She reports that she does not use drugs.  FH: Family history was reviewed, no pertinent findings to report       Objective:     /70 (BP Location: Left arm, Patient Position: Sitting, BP Cuff Size: Adult)   Pulse 96   Temp 36.9 °C (98.4 °F) (Temporal)   Resp 14   Ht 1.473 m (4' 10\")   Wt 64.4 kg (142 lb)   SpO2 99%   BMI 29.68 kg/m²      Physical Exam     HIP EXAM:  Mildly antalgic gait gait    Right hip: Range of motion is intact  POSITIVE pain with internal rotation  sammy's test is NEGATIVE  NO tenderness of the trochanteric bursa  NO tenderness of the gluteus medius  Archel's test is NEGATIVE    Left hip: Range of motion is intact  NEGATIVE pain with internal rotation  sammy's test is NEGATIVE  NO tenderness of the trochanteric bursa  NO tenderness of the gluteus medius  Rachel's test is NEGATIVE    Knee exam:  RIGHT KNEE:  Normal alignment  No visual swelling or deformity  MILD apprehension  POSITIVE medial facet tenderness  No  joint line tenderness medially or laterally  Ford's testing is negative medially and laterally  Lachman's testing is trace with good endpoint  No laxity to varus and valgus stress  The " legs otherwise neurovascularly intact    LEFT KNEE:  Normal alignment  No visual swelling or deformity  Anterior knee tenderness at the medial facet and lateral facet mildly  POSITIVE MILD apprehension  No  joint line tenderness medially or laterally  Ford's testing is negative medially and laterally  Lachman's testing is trace with good endpoint  No laxity to varus and valgus stress  The legs otherwise neurovascularly intact    Assessment/Plan:     1. Chronic pain of both knees  triamcinolone acetonide (KENALOG-40) injection 40 mg   2. Hip pain, right     3. Smoker  REFERRAL TO TOBACCO CESSATION PROGRAM     History of chronic knee pain  MRI performed back in May 2017 demonstrates increased signal at the articular portion of the patellar cartilage suggesting cartilage degeneration versus defect not mentioned in the official report    Mild patella lateralization    POSITIVE osteoarthritis of the hip worse on the RIGHT compared to the left    Patient has FAILED formal physical therapy in the past  Recommend Pilates program  Provided home exercises for her hip and her knees    Corticosteroid injection of the LEFT knee performed in the office TODAY (February 28, 2019)    Recommended smoking cessation, she will consider it    Return in about 4 weeks (around 3/28/2019).  To see how she is doing after her LEFT knee intra-articular corticosteroid injection  And see if she is doing her home Pilates program  She has FAILED formal physical therapy in the past  We will also see how she feels about smoking cessation at that time        1/15/2019 12:51 PM    HISTORY/REASON FOR EXAM:  Atraumatic Pain/Swelling/Deformity  Left knee acute pain X 3 days, no trauma    TECHNIQUE/EXAM DESCRIPTION AND NUMBER OF VIEWS:  2 views of the LEFT knee.    COMPARISON: 20 3/21/17    FINDINGS:  No acute fracture or dislocation.    No joint osteoarthritis    No knee joint effusion.   Impression         1. No acute osseous abnormality.                            3/21/2017 10:53 AM    HISTORY/REASON FOR EXAM:  Pain/Deformity Following Trauma  Anterior pain    TECHNIQUE/EXAM DESCRIPTION AND NUMBER OF VIEWS:  3 views of the RIGHT knee.    COMPARISON: None    FINDINGS:  No fracture or dislocation is seen. Alignment is maintained. No joint effusion is identified. Mineralization is maintained.   Impression       No evidence of acute fracture or dislocation.           5/18/2017 12:59 PM    HISTORY/REASON FOR EXAM:  Knee pain following trauma.  Medial pain    TECHNIQUE/EXAM DESCRIPTION:  MRI of the RIGHT knee without contrast.    The study was performed on a Estrella 3.0 Sandra MRI scanner.  T1 coronal, proton density fat-suppressed coronal, fast spin-echo T2 fat-suppressed axial, intermediate fast spin-echo sagittal, and intermediate fast spin-echo fat-suppressed thin-section 3D   sagittal images were obtained.    COMPARISON: 3/21/2017 radiographs    FINDINGS:  There is a physiologic amount of joint fluid. There is no Baker's cyst.    Menisci:  Normal in signal and morphology.    Tendons and Ligaments:  The cruciate and collateral ligaments are intact. No acute flexor or extensor mechanism abnormality is seen.    Osseous Structures/Cartilage:  No fracture, focal cartilage defect or marrow edema.   Impression       Normal knee MRI.             1/28/2017 8:51 PM    HISTORY/REASON FOR EXAM:  Atraumatic Pain/Deformity.  Multiple falls, RIGHT hip pain    TECHNIQUE/EXAM DESCRIPTION AND NUMBER OF VIEWS:  CT scan of the RIGHT lower extremity without contrast and including reconstructions.    Thin-section noncontrast helical images were obtained. Coronal and sagittal reconstructions were generated from the axial images.    Up to date radiation dose reduction adjustments have been utilized to meet ALARA standards for radiation dose reduction.    COMPARISON: None.    FINDINGS:  Pelvis and sacrum are intact.  Minimal degenerative change of the SI joints.  Visualized proximal  femurs are intact and normally located.  Minimal loss of joint space the weightbearing portion of both hips.  Small amount of peritoneal fluid dependently in the pelvis.  Increased colonic stool.   Impression       1.  No RIGHT hip or pelvic fracture.  2.  Minimal degenerative change of both hips.  3.  Small amount of pelvic fluid, nonspecific.     Interpreted in the office today with the patient    Thank you Raegan Schaffer M.D. for allowing me to participate in caring for your patient

## 2019-02-28 NOTE — PROCEDURES
PROCEDURE NOTE:  left Knee corticosteroid injection  Risks and benefits discussed  Informed consent obtained  Knee prepped in sterile fashion utilizing a anshul- inferior approach  40 mg of Kenalog and 5 cc of Marcaine injected into the knee joint space  Vapocoolant spray was utilized  Patient tolerated the procedure well  Postprocedure care and red flags discussed

## 2019-03-22 ENCOUNTER — HOSPITAL ENCOUNTER (OUTPATIENT)
Dept: LAB | Facility: MEDICAL CENTER | Age: 29
End: 2019-03-22
Attending: FAMILY MEDICINE
Payer: COMMERCIAL

## 2019-03-22 DIAGNOSIS — R42 EPISODIC LIGHTHEADEDNESS: ICD-10-CM

## 2019-03-22 LAB
ALBUMIN SERPL BCP-MCNC: 4.6 G/DL (ref 3.2–4.9)
ALBUMIN/GLOB SERPL: 2.7 G/DL
ALP SERPL-CCNC: 53 U/L (ref 30–99)
ALT SERPL-CCNC: 22 U/L (ref 2–50)
ANION GAP SERPL CALC-SCNC: 6 MMOL/L (ref 0–11.9)
AST SERPL-CCNC: 15 U/L (ref 12–45)
BILIRUB SERPL-MCNC: 0.8 MG/DL (ref 0.1–1.5)
BUN SERPL-MCNC: 10 MG/DL (ref 8–22)
CALCIUM SERPL-MCNC: 8.9 MG/DL (ref 8.5–10.5)
CHLORIDE SERPL-SCNC: 110 MMOL/L (ref 96–112)
CO2 SERPL-SCNC: 26 MMOL/L (ref 20–33)
CREAT SERPL-MCNC: 0.71 MG/DL (ref 0.5–1.4)
GLOBULIN SER CALC-MCNC: 1.7 G/DL (ref 1.9–3.5)
GLUCOSE SERPL-MCNC: 89 MG/DL (ref 65–99)
POTASSIUM SERPL-SCNC: 4 MMOL/L (ref 3.6–5.5)
PROT SERPL-MCNC: 6.3 G/DL (ref 6–8.2)
SODIUM SERPL-SCNC: 142 MMOL/L (ref 135–145)
T4 FREE SERPL-MCNC: 0.95 NG/DL (ref 0.53–1.43)
TSH SERPL DL<=0.005 MIU/L-ACNC: 2.74 UIU/ML (ref 0.38–5.33)

## 2019-03-22 PROCEDURE — 84443 ASSAY THYROID STIM HORMONE: CPT

## 2019-03-22 PROCEDURE — 80053 COMPREHEN METABOLIC PANEL: CPT

## 2019-03-22 PROCEDURE — 84439 ASSAY OF FREE THYROXINE: CPT

## 2019-03-22 PROCEDURE — 36415 COLL VENOUS BLD VENIPUNCTURE: CPT

## 2019-03-28 ENCOUNTER — OFFICE VISIT (OUTPATIENT)
Dept: MEDICAL GROUP | Facility: CLINIC | Age: 29
End: 2019-03-28
Payer: COMMERCIAL

## 2019-03-28 VITALS
TEMPERATURE: 98.5 F | OXYGEN SATURATION: 98 % | SYSTOLIC BLOOD PRESSURE: 110 MMHG | WEIGHT: 142 LBS | DIASTOLIC BLOOD PRESSURE: 70 MMHG | BODY MASS INDEX: 29.81 KG/M2 | HEIGHT: 58 IN | RESPIRATION RATE: 14 BRPM | HEART RATE: 88 BPM

## 2019-03-28 DIAGNOSIS — M25.562 CHRONIC PAIN OF BOTH KNEES: ICD-10-CM

## 2019-03-28 DIAGNOSIS — M25.561 CHRONIC PAIN OF BOTH KNEES: ICD-10-CM

## 2019-03-28 DIAGNOSIS — M25.551 HIP PAIN, RIGHT: ICD-10-CM

## 2019-03-28 DIAGNOSIS — G89.29 CHRONIC PAIN OF BOTH KNEES: ICD-10-CM

## 2019-03-28 PROCEDURE — 99213 OFFICE O/P EST LOW 20 MIN: CPT | Mod: 25 | Performed by: FAMILY MEDICINE

## 2019-03-28 RX ORDER — TRIAMCINOLONE ACETONIDE 40 MG/ML
40 INJECTION, SUSPENSION INTRA-ARTICULAR; INTRAMUSCULAR ONCE
Status: COMPLETED | OUTPATIENT
Start: 2019-03-28 | End: 2019-03-28

## 2019-03-28 RX ADMIN — TRIAMCINOLONE ACETONIDE 40 MG: 40 INJECTION, SUSPENSION INTRA-ARTICULAR; INTRAMUSCULAR at 11:59

## 2019-03-28 NOTE — PROGRESS NOTES
"Subjective:      Veronica Kirby is a 29 y.o. female who presents with Hip Pain (Right Hip Pain.) and Knee Pain (Bilateral knee pain.)    FOLLOW up BILATERAL knee pain (right greater than left) as well as RIGHT hip pain  Pain on right hip and knee are WORSE    BILATERAL knee pain (right greater than left) as well as RIGHT hip pain  BILATERAL knee pain which began approximately 15-16 years ago after an accident while skiing, slipped forward  Continued intermittent swelling since then  And RIGHT hip pain which began approximately 2 years ago (back in 2017)    patellofemoral region  Burning  No radiation  Improved with rest and icing  Worse with ambulation  POSITIVE popping, which is painful  POSITIVE stiffness, particularly after sitting for long periods  POSITIVE swelling which is intermittent    LEFT knee is 50% improved after corticosteroid injection performed on February 20, 2019    She did undergo RIGHT knee injury back in 2017 after a workplace injury  Landed on a flexed knee after tripping on something at work  Underwent formal physical therapy back then which did NOT help    RIGHT hip pain predominantly at the posterior lateral aspect of the RIGHT hip/pelvis  Pain is sharp  Intermittent  Improved with rest  Worse with standing  Denies any prior hip issues  Occasional night symptoms, denies any pain with sleeping on the RIGHT side    She has NOT had any corticosteroid injections    Seen by PCP  She has had x-rays and MRI of the knee    Works at [employer], , standing for 12-hour shifts       Objective:     /70 (BP Location: Left arm, Patient Position: Sitting, BP Cuff Size: Adult)   Pulse 88   Temp 36.9 °C (98.5 °F) (Temporal)   Resp 14   Ht 1.473 m (4' 10\")   Wt 64.4 kg (142 lb)   LMP 03/05/2019 (Approximate)   SpO2 98%   BMI 29.68 kg/m²     Physical Exam     HIP EXAM:  Mildly antalgic gait gait    Right hip: Range of motion is intact  POSITIVE pain with internal rotation  sammy's " test is NEGATIVE    Left hip: Range of motion is intact  NEGATIVE pain with internal rotation    Knee exam:  RIGHT KNEE:  Normal alignment  No visual swelling or deformity  MILD apprehension  POSITIVE medial facet tenderness  MEDIAL line tenderness medially or laterally  Ford's testing is negative medially and laterally  Lachman's testing is trace with good endpoint  No laxity to varus and valgus stress  The legs otherwise neurovascularly intact    LEFT KNEE:  Normal alignment  No visual swelling or deformity  Anterior knee tenderness at the medial facet and lateral facet mildly  POSITIVE MILD apprehension  No  joint line tenderness medially or laterally  Ford's testing is negative medially and laterally  Lachman's testing is trace with good endpoint  No laxity to varus and valgus stress  The legs otherwise neurovascularly intact    Assessment/Plan:     1. Chronic pain of both knees  triamcinolone acetonide (KENALOG-40) injection 40 mg   2. Hip pain, right       History of chronic knee pain  MRI performed back in May 2017 demonstrates increased signal at the articular portion of the patellar cartilage suggesting cartilage degeneration versus defect not mentioned in the official report  Mild patella lateralization    POSITIVE osteoarthritis of the hip worse on the RIGHT compared to the left  Intra-articular corticosteroid injection the RIGHT knee performed in the office TODAY (March 20, 2019)    Patient has FAILED formal physical therapy in the past  Recommend continuing home Pilates program  Continue home exercises for her hip and her knees    LEFT knee is 50% improved after corticosteroid injection performed on February 20, 2019    Return in about 4 weeks (around 4/25/2019).  To see how she is doing after intra-articular RIGHT knee corticosteroid injection  If her symptoms/lumbar spine/SI joint pain persist, consider referral for physiatry evaluation      1/15/2019 12:51 PM    HISTORY/REASON FOR EXAM:   Atraumatic Pain/Swelling/Deformity  Left knee acute pain X 3 days, no trauma    TECHNIQUE/EXAM DESCRIPTION AND NUMBER OF VIEWS:  2 views of the LEFT knee.    COMPARISON: 20 3/21/17    FINDINGS:  No acute fracture or dislocation.    No joint osteoarthritis    No knee joint effusion.   Impression         1. No acute osseous abnormality.               3/21/2017 10:53 AM    HISTORY/REASON FOR EXAM:  Pain/Deformity Following Trauma  Anterior pain    TECHNIQUE/EXAM DESCRIPTION AND NUMBER OF VIEWS:  3 views of the RIGHT knee.    COMPARISON: None    FINDINGS:  No fracture or dislocation is seen. Alignment is maintained. No joint effusion is identified. Mineralization is maintained.   Impression       No evidence of acute fracture or dislocation.           5/18/2017 12:59 PM    HISTORY/REASON FOR EXAM:  Knee pain following trauma.  Medial pain    TECHNIQUE/EXAM DESCRIPTION:  MRI of the RIGHT knee without contrast.    The study was performed on a Estrella 3.0 Sandra MRI scanner.  T1 coronal, proton density fat-suppressed coronal, fast spin-echo T2 fat-suppressed axial, intermediate fast spin-echo sagittal, and intermediate fast spin-echo fat-suppressed thin-section 3D   sagittal images were obtained.    COMPARISON: 3/21/2017 radiographs    FINDINGS:  There is a physiologic amount of joint fluid. There is no Baker's cyst.    Menisci:  Normal in signal and morphology.    Tendons and Ligaments:  The cruciate and collateral ligaments are intact. No acute flexor or extensor mechanism abnormality is seen.    Osseous Structures/Cartilage:  No fracture, focal cartilage defect or marrow edema.   Impression       Normal knee MRI.             1/28/2017 8:51 PM    HISTORY/REASON FOR EXAM:  Atraumatic Pain/Deformity.  Multiple falls, RIGHT hip pain    TECHNIQUE/EXAM DESCRIPTION AND NUMBER OF VIEWS:  CT scan of the RIGHT lower extremity without contrast and including reconstructions.    Thin-section noncontrast helical images were obtained.  Coronal and sagittal reconstructions were generated from the axial images.    Up to date radiation dose reduction adjustments have been utilized to meet ALARA standards for radiation dose reduction.    COMPARISON: None.    FINDINGS:  Pelvis and sacrum are intact.  Minimal degenerative change of the SI joints.  Visualized proximal femurs are intact and normally located.  Minimal loss of joint space the weightbearing portion of both hips.  Small amount of peritoneal fluid dependently in the pelvis.  Increased colonic stool.   Impression       1.  No RIGHT hip or pelvic fracture.  2.  Minimal degenerative change of both hips.  3.  Small amount of pelvic fluid, nonspecific.     Thank you Raegan Schaffer M.D. for allowing me to participate in caring for your patient

## 2019-04-15 ENCOUNTER — OFFICE VISIT (OUTPATIENT)
Dept: URGENT CARE | Facility: CLINIC | Age: 29
End: 2019-04-15
Payer: COMMERCIAL

## 2019-04-15 VITALS
TEMPERATURE: 97.6 F | DIASTOLIC BLOOD PRESSURE: 70 MMHG | SYSTOLIC BLOOD PRESSURE: 110 MMHG | OXYGEN SATURATION: 95 % | RESPIRATION RATE: 16 BRPM | HEIGHT: 58 IN | BODY MASS INDEX: 29.81 KG/M2 | WEIGHT: 142 LBS | HEART RATE: 91 BPM

## 2019-04-15 DIAGNOSIS — R11.0 NAUSEA: ICD-10-CM

## 2019-04-15 DIAGNOSIS — R09.81 SINUS CONGESTION: ICD-10-CM

## 2019-04-15 DIAGNOSIS — R51.9 ACUTE NONINTRACTABLE HEADACHE, UNSPECIFIED HEADACHE TYPE: ICD-10-CM

## 2019-04-15 PROCEDURE — 99214 OFFICE O/P EST MOD 30 MIN: CPT | Performed by: PHYSICIAN ASSISTANT

## 2019-04-15 RX ORDER — DOXYCYCLINE HYCLATE 100 MG
100 TABLET ORAL 2 TIMES DAILY
Qty: 14 TAB | Refills: 0 | Status: SHIPPED | OUTPATIENT
Start: 2019-04-15 | End: 2019-04-22

## 2019-04-15 RX ORDER — ONDANSETRON 4 MG/1
4 TABLET, ORALLY DISINTEGRATING ORAL ONCE
Status: COMPLETED | OUTPATIENT
Start: 2019-04-15 | End: 2019-04-15

## 2019-04-15 RX ORDER — KETOROLAC TROMETHAMINE 30 MG/ML
60 INJECTION, SOLUTION INTRAMUSCULAR; INTRAVENOUS ONCE
Status: COMPLETED | OUTPATIENT
Start: 2019-04-15 | End: 2019-04-15

## 2019-04-15 RX ORDER — ONDANSETRON 4 MG/1
4 TABLET, FILM COATED ORAL EVERY 4 HOURS PRN
Qty: 30 TAB | Refills: 0 | Status: SHIPPED | OUTPATIENT
Start: 2019-04-15 | End: 2020-03-05

## 2019-04-15 RX ADMIN — KETOROLAC TROMETHAMINE 60 MG: 30 INJECTION, SOLUTION INTRAMUSCULAR; INTRAVENOUS at 18:20

## 2019-04-15 RX ADMIN — ONDANSETRON 4 MG: 4 TABLET, ORALLY DISINTEGRATING ORAL at 18:21

## 2019-04-15 NOTE — LETTER
April 15, 2019         Patient: Veronica Kirby   YOB: 1990   Date of Visit: 4/15/2019           To Whom it May Concern:    Veronica Kirby was seen in my clinic on 4/15/2019. Please excuse her absence from 4/15/19-4/16/19.     If you have any questions or concerns, please don't hesitate to call.        Sincerely,           Malka Nolasco P.A.-C.  Electronically Signed

## 2019-04-16 ASSESSMENT — ENCOUNTER SYMPTOMS
COUGH: 0
DIARRHEA: 0
SINUS PAIN: 1
SHORTNESS OF BREATH: 0
VOMITING: 1
NAUSEA: 1
DIZZINESS: 0
MUSCULOSKELETAL NEGATIVE: 1
FEVER: 0
ABDOMINAL PAIN: 0
CHILLS: 0
SPUTUM PRODUCTION: 0
HEADACHES: 1
SORE THROAT: 0

## 2019-04-16 NOTE — PROGRESS NOTES
"Subjective:      Veronica Kirby is a 29 y.o. female who presents with Sinusitis (x2 weeks ); Emesis (x1 day); and Otalgia (left ear)            HPI   Patient presents to urgent care reporting a 2 week history of intermittent headaches, localized mainly behind her eyes. She states today the left ear started feeling plugged and she started feeling nauseated with 3 episodes of vomiting. She denies fevers, chills, body aches, chest pain, cough, sore throat, or SOB. No history of migraines or recent head trauma.     Review of Systems   Constitutional: Negative for chills and fever.   HENT: Positive for congestion and sinus pain. Negative for ear pain and sore throat.    Respiratory: Negative for cough, sputum production and shortness of breath.    Cardiovascular: Negative for chest pain.   Gastrointestinal: Positive for nausea and vomiting. Negative for abdominal pain and diarrhea.   Genitourinary: Negative.    Musculoskeletal: Negative.    Skin: Negative for rash.   Neurological: Positive for headaches. Negative for dizziness.        Objective:     /70 (BP Location: Left arm, Patient Position: Sitting)   Pulse 91   Temp 36.4 °C (97.6 °F) (Temporal)   Resp 16   Ht 1.473 m (4' 10\")   Wt 64.4 kg (142 lb)   SpO2 95%   BMI 29.68 kg/m²      Physical Exam   Constitutional: She is oriented to person, place, and time. She appears well-developed and well-nourished. No distress.   HENT:   Head: Normocephalic and atraumatic.       Right Ear: Hearing, tympanic membrane, external ear and ear canal normal.   Left Ear: Hearing, tympanic membrane, external ear and ear canal normal.   Nose: Mucosal edema present. Right sinus exhibits frontal sinus tenderness. Right sinus exhibits no maxillary sinus tenderness. Left sinus exhibits frontal sinus tenderness. Left sinus exhibits no maxillary sinus tenderness.   Mouth/Throat: Oropharynx is clear and moist. No oropharyngeal exudate, posterior oropharyngeal edema or posterior " "oropharyngeal erythema.   Eyes: Pupils are equal, round, and reactive to light. Conjunctivae are normal. Right eye exhibits no discharge. Left eye exhibits no discharge.   Neck: Normal range of motion.   Cardiovascular: Normal rate, regular rhythm and normal heart sounds.    No murmur heard.  Pulmonary/Chest: Effort normal and breath sounds normal. No respiratory distress. She has no wheezes. She has no rales.   Musculoskeletal: Normal range of motion.   Lymphadenopathy:     She has no cervical adenopathy.   Neurological: She is alert and oriented to person, place, and time.   Skin: Skin is warm and dry. She is not diaphoretic.   Psychiatric: She has a normal mood and affect. Her behavior is normal.   Nursing note and vitals reviewed.         PMH:  has a past medical history of Psychiatric disorder.  MEDS:   Current Outpatient Prescriptions:   •  doxycycline (VIBRAMYCIN) 100 MG Tab, Take 1 Tab by mouth 2 times a day for 7 days., Disp: 14 Tab, Rfl: 0  •  ondansetron (ZOFRAN) 4 MG Tab tablet, Take 1 Tab by mouth every four hours as needed for Nausea/Vomiting., Disp: 30 Tab, Rfl: 0  •  acyclovir (ZOVIRAX) 400 MG tablet, Take 1 Tab by mouth 2 times a day., Disp: 180 Tab, Rfl: 3  •  ascorbic acid (ASCORBIC ACID) 500 MG Tab, Take 500 mg by mouth every day., Disp: , Rfl:   •  MELATONIN PO, Take 1 Each by mouth PRN., Disp: , Rfl:   •  IRON PO, Take 2 Tabs by mouth every day., Disp: , Rfl:   •  naproxen (NAPROSYN) 500 MG Tab, Take 500 mg by mouth as needed. Indications: Mild to Moderate Pain, Disp: , Rfl:   ALLERGIES:   Allergies   Allergen Reactions   • Pcn [Penicillins] Anaphylaxis   • Sulfa Drugs Anaphylaxis   • Codeine Unspecified     Pt states \"I was a childhood allergie\".     SURGHX: History reviewed. No pertinent surgical history.  SOCHX:  reports that she has been smoking.  She has been smoking about 0.50 packs per day. She has never used smokeless tobacco. She reports that she drinks alcohol. She reports that she " does not use drugs.  FH: family history is not on file.       Assessment/Plan:     1. Acute nonintractable headache, unspecified headache type  - ketorolac (TORADOL) injection 60 mg; 2 mL by Intramuscular route Once.   - Given in urgent care without significant relief of headache   - Complete full course of antibiotics as prescribed     2. Nausea  - ondansetron (ZOFRAN ODT) dispertab 4 mg; Take 1 Tab by mouth Once.   - Given in urgent care with good relief of nausea   - ondansetron (ZOFRAN) 4 MG Tab tablet; Take 1 Tab by mouth every four hours as needed for Nausea/Vomiting.  Dispense: 30 Tab; Refill: 0    3. Sinus congestion  - doxycycline (VIBRAMYCIN) 100 MG Tab; Take 1 Tab by mouth 2 times a day for 7 days.  Dispense: 14 Tab; Refill: 0   - Complete full course of antibiotics as prescribed     Discussed use of nedi-pot, humidifier, and Flonase nasal spray for symptomatic relief. OTC tylenol or ibuprofen as needed for headache relief. Increased fluids and rest. Call or return to office if symptoms persist or worsen. Work note provided today. The patient demonstrated a good understanding and agreed with the treatment plan.

## 2019-04-25 ENCOUNTER — OFFICE VISIT (OUTPATIENT)
Dept: MEDICAL GROUP | Facility: CLINIC | Age: 29
End: 2019-04-25
Payer: COMMERCIAL

## 2019-04-25 VITALS
BODY MASS INDEX: 29.81 KG/M2 | TEMPERATURE: 98.8 F | RESPIRATION RATE: 16 BRPM | HEART RATE: 80 BPM | DIASTOLIC BLOOD PRESSURE: 70 MMHG | SYSTOLIC BLOOD PRESSURE: 112 MMHG | WEIGHT: 142 LBS | HEIGHT: 58 IN | OXYGEN SATURATION: 98 %

## 2019-04-25 DIAGNOSIS — M76.01 GLUTEAL TENDINITIS OF RIGHT BUTTOCK: ICD-10-CM

## 2019-04-25 DIAGNOSIS — M22.2X1 PATELLOFEMORAL SYNDROME, BILATERAL: ICD-10-CM

## 2019-04-25 DIAGNOSIS — M22.2X2 PATELLOFEMORAL SYNDROME, BILATERAL: ICD-10-CM

## 2019-04-25 DIAGNOSIS — M70.61 TROCHANTERIC BURSITIS OF RIGHT HIP: ICD-10-CM

## 2019-04-25 PROCEDURE — 20610 DRAIN/INJ JOINT/BURSA W/O US: CPT | Performed by: FAMILY MEDICINE

## 2019-04-25 PROCEDURE — 99214 OFFICE O/P EST MOD 30 MIN: CPT | Mod: 25 | Performed by: FAMILY MEDICINE

## 2019-04-25 RX ORDER — TRIAMCINOLONE ACETONIDE 40 MG/ML
40 INJECTION, SUSPENSION INTRA-ARTICULAR; INTRAMUSCULAR ONCE
Status: COMPLETED | OUTPATIENT
Start: 2019-04-25 | End: 2019-04-25

## 2019-04-25 RX ADMIN — TRIAMCINOLONE ACETONIDE 40 MG: 40 INJECTION, SUSPENSION INTRA-ARTICULAR; INTRAMUSCULAR at 10:19

## 2019-04-25 NOTE — PROCEDURES
PROCEDURE NOTE:  right HIP, trochanteric corticosteroid injection  Consent was obtained, using sterile technique the hip was prepped and 5 ml's of 2% Marcaine  lateral approach.   Vapocoolant spray used  Steroid kenalog 40 mg and 5 ml plain Marcaine was injected and the needle withdrawn.  The procedure was well tolerated.    Watch for fever, or increased swelling or persistent pain in injection site.   Call or return to clinic prn if such symptoms occur or the hip fails to improve as anticipated.

## 2019-04-25 NOTE — PROGRESS NOTES
"Subjective:      Veronica Kirby is a 29 y.o. female who presents with Hip Pain (Right Hip Pain.) and Knee Pain (Bilateral knee pain.)    FOLLOW up BILATERAL knee pain (right greater than left) as well as RIGHT hip pain  Pain on right hip and knee are WORSE    RIGHT hip pain PERSISTS predominantly at the posterior lateral aspect of the RIGHT hip/pelvis  Pain is sharp  Intermittent  Improved with rest  Worse with standing and walking  Denies any prior hip issues  Occasional night symptoms, denies any pain with sleeping on the RIGHT side    BILATERAL knee pain (right greater than left) as well as RIGHT hip pain  BILATERAL knee pain which began approximately 15-16 years ago after an accident while skiing, slipped forward  Continued intermittent swelling since then  And RIGHT hip pain which began approximately 2 years ago (back in 2017)    LEFT knee is 50% improved after corticosteroid injection performed on February 20, 2019    She did undergo RIGHT knee injury back in 2017 after a workplace injury  Landed on a flexed knee after tripping on something at work  Underwent formal physical therapy back then which did NOT help    Seen by PCP  She has had x-rays and MRI of the knee    Works at [employer], , standing for 12-hour shifts     Objective:     /70 (BP Location: Left arm, Patient Position: Sitting, BP Cuff Size: Adult)   Pulse 80   Temp 37.1 °C (98.8 °F) (Temporal)   Resp 16   Ht 1.473 m (4' 10\")   Wt 64.4 kg (142 lb)   SpO2 98%   BMI 29.68 kg/m²     Physical Exam     HIP EXAM:  Mildly antalgic gait gait    Right hip: Range of motion is intact  NO pain with internal rotation  sammy's test is NEGATIVE  POSITIVE tenderness to the RIGHT trochanteric bursa    Left hip: Range of motion is intact  NEGATIVE pain with internal rotation  MINIMAL tenderness of the trochanteric bursa    Knee exam:  RIGHT KNEE:  MILD apprehension  POSITIVE medial facet tenderness  The legs otherwise neurovascularly " intact    LEFT KNEE:  Anterior knee tenderness at the medial facet and lateral facet mildly  POSITIVE MILD apprehension  The legs otherwise neurovascularly intact    Assessment/Plan:     1. Trochanteric bursitis of right hip  triamcinolone acetonide (KENALOG-40) injection 40 mg    REFERRAL TO PHYSICAL THERAPY Reason for Therapy: Eval/Treat/Report   2. Gluteal tendinitis of right buttock  REFERRAL TO PHYSICAL THERAPY Reason for Therapy: Eval/Treat/Report   3. Patellofemoral syndrome, bilateral  REFERRAL TO PHYSICAL THERAPY Reason for Therapy: Eval/Treat/Report     Trochanteric bursitis right hip worse than the left  RIGHT hip corticosteroid injection of the trochanteric bursa performed in the office TODAY (April 25, 2019)    History of chronic knee pain  MRI performed back in May 2017 demonstrates increased signal at the articular portion of the patellar cartilage suggesting cartilage degeneration versus defect not mentioned in the official report  Mild patella lateralization    POSITIVE osteoarthritis of the hip worse on the RIGHT compared to the left  Intra-articular corticosteroid injection the RIGHT knee performed (March 20, 2019) HELPED   LEFT knee is 50% improved after corticosteroid injection performed on February 20, 2019    Referral for formal physical therapy    Return in about 4 weeks (around 5/23/2019).  To see how she is doing after intra-articular RIGHT trochanteric bursa corticosteroid injection  And see if she started PT      1/15/2019 12:51 PM    HISTORY/REASON FOR EXAM:  Atraumatic Pain/Swelling/Deformity  Left knee acute pain X 3 days, no trauma    TECHNIQUE/EXAM DESCRIPTION AND NUMBER OF VIEWS:  2 views of the LEFT knee.    COMPARISON: 20 3/21/17    FINDINGS:  No acute fracture or dislocation.    No joint osteoarthritis    No knee joint effusion.   Impression         1. No acute osseous abnormality.             3/21/2017 10:53 AM    HISTORY/REASON FOR EXAM:  Pain/Deformity Following  Trauma  Anterior pain    TECHNIQUE/EXAM DESCRIPTION AND NUMBER OF VIEWS:  3 views of the RIGHT knee.    COMPARISON: None    FINDINGS:  No fracture or dislocation is seen. Alignment is maintained. No joint effusion is identified. Mineralization is maintained.   Impression       No evidence of acute fracture or dislocation.           5/18/2017 12:59 PM    HISTORY/REASON FOR EXAM:  Knee pain following trauma.  Medial pain    TECHNIQUE/EXAM DESCRIPTION:  MRI of the RIGHT knee without contrast.    The study was performed on a [employer]rella 3.0 Sandra MRI scanner.  T1 coronal, proton density fat-suppressed coronal, fast spin-echo T2 fat-suppressed axial, intermediate fast spin-echo sagittal, and intermediate fast spin-echo fat-suppressed thin-section 3D   sagittal images were obtained.    COMPARISON: 3/21/2017 radiographs    FINDINGS:  There is a physiologic amount of joint fluid. There is no Baker's cyst.    Menisci:  Normal in signal and morphology.    Tendons and Ligaments:  The cruciate and collateral ligaments are intact. No acute flexor or extensor mechanism abnormality is seen.    Osseous Structures/Cartilage:  No fracture, focal cartilage defect or marrow edema.   Impression       Normal knee MRI.             1/28/2017 8:51 PM    HISTORY/REASON FOR EXAM:  Atraumatic Pain/Deformity.  Multiple falls, RIGHT hip pain    TECHNIQUE/EXAM DESCRIPTION AND NUMBER OF VIEWS:  CT scan of the RIGHT lower extremity without contrast and including reconstructions.    Thin-section noncontrast helical images were obtained. Coronal and sagittal reconstructions were generated from the axial images.    Up to date radiation dose reduction adjustments have been utilized to meet ALARA standards for radiation dose reduction.    COMPARISON: None.    FINDINGS:  Pelvis and sacrum are intact.  Minimal degenerative change of the SI joints.  Visualized proximal femurs are intact and normally located.  Minimal loss of joint space the weightbearing  portion of both hips.  Small amount of peritoneal fluid dependently in the pelvis.  Increased colonic stool.   Impression       1.  No RIGHT hip or pelvic fracture.  2.  Minimal degenerative change of both hips.  3.  Small amount of pelvic fluid, nonspecific.     Thank you Raegan Schaffer M.D. for allowing me to participate in caring for your patient

## 2019-05-15 ENCOUNTER — APPOINTMENT (OUTPATIENT)
Dept: OTHER | Facility: MEDICAL CENTER | Age: 29
End: 2019-05-15
Payer: COMMERCIAL

## 2019-05-22 ENCOUNTER — PHYSICAL THERAPY (OUTPATIENT)
Dept: PHYSICAL THERAPY | Facility: MEDICAL CENTER | Age: 29
End: 2019-05-22
Attending: FAMILY MEDICINE
Payer: COMMERCIAL

## 2019-05-22 DIAGNOSIS — M70.61 TROCHANTERIC BURSITIS OF RIGHT HIP: ICD-10-CM

## 2019-05-22 DIAGNOSIS — M22.2X2 PATELLOFEMORAL SYNDROME, BILATERAL: ICD-10-CM

## 2019-05-22 DIAGNOSIS — M22.2X1 PATELLOFEMORAL SYNDROME, BILATERAL: ICD-10-CM

## 2019-05-22 DIAGNOSIS — M76.01 GLUTEAL TENDINITIS OF RIGHT BUTTOCK: ICD-10-CM

## 2019-05-22 PROCEDURE — 97014 ELECTRIC STIMULATION THERAPY: CPT

## 2019-05-22 PROCEDURE — 97161 PT EVAL LOW COMPLEX 20 MIN: CPT

## 2019-05-22 ASSESSMENT — ENCOUNTER SYMPTOMS
QUALITY: ACHING
PAIN SCALE AT HIGHEST: 8
QUALITY: TIGHTNESS
PAIN SCALE AT LOWEST: 4
PAIN SCALE: 6
PAIN LOCATION: RIGHT LATERAL HIP PAIN
PAIN TIMING: INTERMITTENT

## 2019-05-22 NOTE — OP THERAPY EVALUATION
Outpatient Physical Therapy  INITIAL EVALUATION    [facility]  [address]  [place] [address]  Phone:  [phone]  Fax:  [phone]    Date of Evaluation: 2019    Patient: Veronica Kirby  YOB: 1990  MRN: [id number]     Referring Provider: aRshad Douglas M.D.  [address] [place] [address] [place] [address]   Referring Diagnosis Trochanteric bursitis of right hip [M70.61];Gluteal tendinitis of right buttock [M76.01];Patellofemoral syndrome, bilateral [M22.2X1, M22.2X2]     Time Calculation  Start time: 845  Stop time: 945 Time Calculation (min): 60 minutes     Physical Therapy Occurrence Codes    Date of onset of impairment:  03   Date physical therapy care plan established or reviewed:  19   Date physical therapy treatment started:  19          Chief Complaint: Hip Problem and Knee Problem    Visit Diagnoses     ICD-10-CM   1. Trochanteric bursitis of right hip M70.61   2. Gluteal tendinitis of right buttock M76.01   3. Patellofemoral syndrome, bilateral M22.2X1    M22.2X2         Subjective   History of Present Illness:     Date of onset:  2003    History of chief complaint:  Pt has had pain in her hips for 16 years and knee pain for about 4 years. Knee pain after a skiing accident. Pt had injections in her knees which has helped. Pt had an injection in her right hip : she hasn't noticed any change in pain. Hip pain is more problematic than the knee pain presently.     Pain:     Current pain ratin    At best pain ratin    At worst pain ratin    Location:  Right lateral hip pain    Quality:  Aching and tightness    Pain timing:  Intermittent    Aggravating factors:  Sitting, laying on right side, stairs hurt her knees, walking 20k steps hurts knees.     Relieving factors:  Heat, injections in the knees, Alleve.     Progression:  Unchanged    Pain comments:  Knees are better since shots, hip about the  same.     Activity Tolerance:     Current activity tolerance / Recreational activities:  Aikido now and then. Walks up to 15-25,000 steps/day at work. Pt going to counseling for her PTSD.     Work:  Works at [employer], walks a lot at work. Works 3 12's, 4 days off then 4 12's and 3 days off. Pt taking care of her dad and aunt who are ill,  so she's busy and sleeps only 2-3 hours/day.     Social Support:     Lives in:  Apartment    Lives with:  Spouse    Hand Dominance:     Hand dominance:  Right    Treatments:     Treatments to date:  Injections, alleve, gentle stretching.     Patient Goals:     Patient goals for therapy:  Decrease pain in hips and knees and do aikido.       Past Medical History:   Diagnosis Date   • Psychiatric disorder     PTSD, personality disorder     No past surgical history on file.    Precautions:       Objective   Observation and functional movement:  Pt has normal gait pattern. No noticeable edema in knees.     Range of motion and strength:    Active range of motion is within functional limits.    Pt has some bilateral pain with end range flexion or squatting in right hip and knees.   Left LE strength wnl's, right hip internal rotation increases hip pain. Pt guarded with MMT of right LE.     Sensation and reflexes:     Sensation is intact.      Reflexes are normal and symmetrical.    Palpation and joint mobility:     Local tenderness to palpation bilataral patella and right hip trochanteric bursae and glut medius.     Special tests:      Negative hip quadrant tests, negative Desi's test.     Gait:      Normal pattern gait.            Therapeutic Exercises (CPT 32368):     1. Pt started on: bridging, clam shells, piriformis stretch      Therapeutic Exercise Summary: Access Code: [email or web address]   URL: [email or web address]   Date: 05/22/2019   Prepared by: Felicia Barber     Exercises  · Supine Bridge - 10 reps - 3 sets - 1x daily - 7x weekly  · Clamshell - 10 reps - 3 sets - 1x daily - 7x  weekly  · Supine Piriformis Stretch with Foot on Ground - 10 reps - 3 sets - 1x daily - 7x weekly      Therapeutic Treatments and Modalities:     1. E Stim Unattended (CPT 12524), IFC and MHP to right glut/hip with mhp in left sidelying    Time-based treatments/modalities:          Assessment, Response and Plan:   Impairments: lacks appropriate home exercise program, pain with function and weight-bearing intolerance    Assessment details:  Mrs. Kirby is a pleasant 29 year old female with chronic knee and hip pain. Knees are better since injections, however right hip is the same. Difficult to say if PT will help bc of patients work schedule and excessive walking which can exacerbate both the hip and knees. I've treated Veronica before and she doesn't tolerate hands on treatment well: possibly bc of hx of PTSD. So we will focus on therapeutic exercise and modalities if helpful.   Barriers to therapy:  Family circumstances  Other barriers to therapy:  PTSD, stress, lack of regular sleep  Prognosis: fair    Goals:   Short Term Goals:   Pt compliant with daily HEP.    Short term goal time span:  1-2 weeks      Long Term Goals:    Pt able to tolerate walking 10-15,000 steps without increase in hip pain.   Pt independent with regular exercises: possibly biking or pool exercises.   WOMAC to improve 50%  Long term goal time span:  4-6 weeks    Plan:   Therapy options:  Physical therapy treatment to continue  Planned therapy interventions:  E Stim Unattended (CPT 27422), Therapeutic Exercise (CPT 82910) and Manual Therapy (CPT 68560)  Frequency:  2x week  Duration in weeks:  8  Discussed with:  Patient      Functional Limitations and Severity Modifiers  WOMAC Grand Total: 40.62   Referring provider co-signature:  I have reviewed this plan of care and my co-signature certifies the need for services.  Certification Dates:   From 5-22-19    To 7-22-19    Physician Signature: ________________________________ Date: ______________

## 2019-05-23 ENCOUNTER — OFFICE VISIT (OUTPATIENT)
Dept: MEDICAL GROUP | Facility: CLINIC | Age: 29
End: 2019-05-23
Payer: COMMERCIAL

## 2019-05-23 VITALS
WEIGHT: 142 LBS | HEIGHT: 58 IN | HEART RATE: 94 BPM | BODY MASS INDEX: 29.81 KG/M2 | SYSTOLIC BLOOD PRESSURE: 112 MMHG | OXYGEN SATURATION: 96 % | DIASTOLIC BLOOD PRESSURE: 70 MMHG | RESPIRATION RATE: 16 BRPM | TEMPERATURE: 98 F

## 2019-05-23 DIAGNOSIS — M70.61 TROCHANTERIC BURSITIS OF RIGHT HIP: ICD-10-CM

## 2019-05-23 DIAGNOSIS — M22.2X1 PATELLOFEMORAL SYNDROME, BILATERAL: ICD-10-CM

## 2019-05-23 DIAGNOSIS — M76.01 GLUTEAL TENDINITIS OF RIGHT BUTTOCK: ICD-10-CM

## 2019-05-23 DIAGNOSIS — M22.2X2 PATELLOFEMORAL SYNDROME, BILATERAL: ICD-10-CM

## 2019-05-23 PROCEDURE — 99214 OFFICE O/P EST MOD 30 MIN: CPT | Performed by: FAMILY MEDICINE

## 2019-05-23 NOTE — PROGRESS NOTES
"Subjective:      Veronica Kirby is a 29 y.o. female who presents with Hip Pain (Right Hip Pain.) and Knee Pain (Bilateral knee pain.)    FOLLOW up BILATERAL knee pain (right greater than left) as well as RIGHT hip pain  Pain on right hip and knee are SAME     RIGHT hip pain PERSISTS predominantly at the posterior lateral aspect of the RIGHT hip/pelvis and gluteus medius region  Pain is sharp  Intermittent  Improved with rest  Occasional night symptoms, denies any pain with sleeping on the RIGHT side    BILATERAL knee pain (right greater than left) as well as RIGHT hip pain  BILATERAL knee pain which began approximately 15-16 years ago after an accident while skiing, slipped forward  Continued intermittent swelling since then  And RIGHT hip pain which began approximately 2 years ago (back in 2017)    She did undergo RIGHT knee injury back in 2017 after a workplace injury  Landed on a flexed knee after tripping on something at work  Underwent formal physical therapy back then which did NOT help    She has had x-rays and MRI of the knee    Works at [employer], , standing for 12-hour shifts     Objective:     /70 (BP Location: Right arm, Patient Position: Sitting, BP Cuff Size: Adult)   Pulse 94   Temp 36.7 °C (98 °F) (Temporal)   Resp 16   Ht 1.473 m (4' 10\")   Wt 64.4 kg (142 lb)   SpO2 96%   BMI 29.68 kg/m²     Physical Exam     HIP EXAM:  Mildly antalgic gait gait    Right hip: Range of motion is intact  NO pain with internal rotation  sammy's test is NEGATIVE  POSITIVE tenderness to the RIGHT gluteus medius    The legs otherwise neurovascularly intact    Assessment/Plan:     1. Trochanteric bursitis of right hip     2. Gluteal tendinitis of right buttock     3. Patellofemoral syndrome, bilateral       RIGHT hip corticosteroid injection of the trochanteric bursa performed (April 25, 2019)  HELPED minimally    Gluteus medius exercise demonstrated in the office TODAY (May 23, " 2019)  Significant reproduction of pain symptoms with gluteus medius exercises    History of chronic knee pain  MRI performed back in May 2017 demonstrates increased signal at the articular portion of the patellar cartilage suggesting cartilage degeneration versus defect not mentioned in the official report  Mild patella lateralization    POSITIVE osteoarthritis of the hip worse on the RIGHT compared to the left  Intra-articular corticosteroid injection the RIGHT knee performed (March 20, 2019) HELPED   LEFT knee is 50% improved after corticosteroid injection performed on February 20, 2019    Continue formal physical therapy, therapist does not feel patient will improve given her prior history of therapy lack of tolerance of manual therapy and history of PTSD    Return in about 6 weeks (around 7/4/2019).  To see how she is doing after intra-articular RIGHT trochanteric bursa corticosteroid injection  And see if she started PT      1/15/2019 12:51 PM    HISTORY/REASON FOR EXAM:  Atraumatic Pain/Swelling/Deformity  Left knee acute pain X 3 days, no trauma    TECHNIQUE/EXAM DESCRIPTION AND NUMBER OF VIEWS:  2 views of the LEFT knee.    COMPARISON: 20 3/21/17    FINDINGS:  No acute fracture or dislocation.    No joint osteoarthritis    No knee joint effusion.   Impression         1. No acute osseous abnormality.             3/21/2017 10:53 AM    HISTORY/REASON FOR EXAM:  Pain/Deformity Following Trauma  Anterior pain    TECHNIQUE/EXAM DESCRIPTION AND NUMBER OF VIEWS:  3 views of the RIGHT knee.    COMPARISON: None    FINDINGS:  No fracture or dislocation is seen. Alignment is maintained. No joint effusion is identified. Mineralization is maintained.   Impression       No evidence of acute fracture or dislocation.           5/18/2017 12:59 PM    HISTORY/REASON FOR EXAM:  Knee pain following trauma.  Medial pain    TECHNIQUE/EXAM DESCRIPTION:  MRI of the RIGHT knee without contrast.    The study was performed on a Estrella  3.0 Sandra MRI scanner.  T1 coronal, proton density fat-suppressed coronal, fast spin-echo T2 fat-suppressed axial, intermediate fast spin-echo sagittal, and intermediate fast spin-echo fat-suppressed thin-section 3D   sagittal images were obtained.    COMPARISON: 3/21/2017 radiographs    FINDINGS:  There is a physiologic amount of joint fluid. There is no Baker's cyst.    Menisci:  Normal in signal and morphology.    Tendons and Ligaments:  The cruciate and collateral ligaments are intact. No acute flexor or extensor mechanism abnormality is seen.    Osseous Structures/Cartilage:  No fracture, focal cartilage defect or marrow edema.   Impression       Normal knee MRI.             1/28/2017 8:51 PM    HISTORY/REASON FOR EXAM:  Atraumatic Pain/Deformity.  Multiple falls, RIGHT hip pain    TECHNIQUE/EXAM DESCRIPTION AND NUMBER OF VIEWS:  CT scan of the RIGHT lower extremity without contrast and including reconstructions.    Thin-section noncontrast helical images were obtained. Coronal and sagittal reconstructions were generated from the axial images.    Up to date radiation dose reduction adjustments have been utilized to meet ALARA standards for radiation dose reduction.    COMPARISON: None.    FINDINGS:  Pelvis and sacrum are intact.  Minimal degenerative change of the SI joints.  Visualized proximal femurs are intact and normally located.  Minimal loss of joint space the weightbearing portion of both hips.  Small amount of peritoneal fluid dependently in the pelvis.  Increased colonic stool.   Impression       1.  No RIGHT hip or pelvic fracture.  2.  Minimal degenerative change of both hips.  3.  Small amount of pelvic fluid, nonspecific.     Thank you Raegan Schaffer M.D. for allowing me to participate in caring for your patient

## 2019-05-29 ENCOUNTER — PHYSICAL THERAPY (OUTPATIENT)
Dept: PHYSICAL THERAPY | Facility: MEDICAL CENTER | Age: 29
End: 2019-05-29
Attending: FAMILY MEDICINE
Payer: COMMERCIAL

## 2019-05-29 DIAGNOSIS — M22.2X1 PATELLOFEMORAL SYNDROME, BILATERAL: ICD-10-CM

## 2019-05-29 DIAGNOSIS — M76.01 GLUTEAL TENDINITIS OF RIGHT BUTTOCK: ICD-10-CM

## 2019-05-29 DIAGNOSIS — M22.2X2 PATELLOFEMORAL SYNDROME, BILATERAL: ICD-10-CM

## 2019-05-29 DIAGNOSIS — M70.61 TROCHANTERIC BURSITIS OF RIGHT HIP: ICD-10-CM

## 2019-05-29 PROCEDURE — 97014 ELECTRIC STIMULATION THERAPY: CPT

## 2019-05-29 PROCEDURE — 97110 THERAPEUTIC EXERCISES: CPT

## 2019-05-29 NOTE — OP THERAPY DAILY TREATMENT
Outpatient Physical Therapy  DAILY TREATMENT     [facility]  [address]  [place] [address]  Phone:  [phone]  Fax:  [phone]    Date: 05/29/2019    Patient: Veronica Kirby  YOB: 1990  MRN: [id number]     Time Calculation  Start time: 1145  Stop time: 1235 Time Calculation (min): 50 minutes     Chief Complaint: Hip Injury; Hip Problem; and Knee Problem    Visit #: 2    SUBJECTIVE:  Pain about the same.  Walking 20,000 steps daily at work, takes aleve irregularly, recent diagnosis with panic attacks.    OBJECTIVE:          Therapeutic Exercises (CPT 94171):     1. Nu step, 3 min, S4 A6.5 R1    2. Clam, 5/1 each leg    3. Reverse clam, 5/1 each leg    4. Hip abd/ext sidelying, 5/1 each leg    5. Bridges on ball, 10/2    6. Piriformis stretches, 30 sec    7. Hip flexor stretches, 30 sec    8. IT band stretches, 30 sec    9. Pull back with opp LE step back, 10/1 each, L1    10. Pull fwd with opp LE step fwd, 10/1 each, L1    11. Supine hip abd with L1 band, 10/2    12. Supine hip add with ball, 10/2    Therapeutic Treatments and Modalities:     1. E Stim Unattended (CPT 15254), IFC and MHP to right hip in L sidelying    Time-based treatments/modalities:  Therapeutic exercise minutes (CPT 23115): 30 minutes       Pain rating before treatment: 5  Pain rating after treatment: 5    ASSESSMENT:   Response to treatment: some increased soreness with exercise, but no increased lasting pain    PLAN/RECOMMENDATIONS:   Plan for treatment: therapy treatment to continue next visit.  Planned interventions for next visit: continue with current treatment.

## 2019-06-05 ENCOUNTER — PHYSICAL THERAPY (OUTPATIENT)
Dept: PHYSICAL THERAPY | Facility: MEDICAL CENTER | Age: 29
End: 2019-06-05
Attending: FAMILY MEDICINE
Payer: COMMERCIAL

## 2019-06-05 DIAGNOSIS — M76.01 GLUTEAL TENDINITIS OF RIGHT BUTTOCK: ICD-10-CM

## 2019-06-05 DIAGNOSIS — M70.61 TROCHANTERIC BURSITIS OF RIGHT HIP: ICD-10-CM

## 2019-06-05 PROCEDURE — 97110 THERAPEUTIC EXERCISES: CPT

## 2019-06-05 PROCEDURE — 97014 ELECTRIC STIMULATION THERAPY: CPT

## 2019-06-05 NOTE — OP THERAPY DAILY TREATMENT
Outpatient Physical Therapy  DAILY TREATMENT     [facility]  [address]  [place] [address]  Phone:  [phone]  Fax:  [phone]    Date: 06/05/2019    Patient: Veronica Kirby  YOB: 1990  MRN: [id number]     Time Calculation  Start time: 0916  Stop time: 0959 Time Calculation (min): 43 minutes     Chief Complaint: Hip Problem    Visit #: 3    SUBJECTIVE:  In the process of looking for a new job, on FMLA right now.  Job will not be with [employer], only 8 hours and not 20,000 steps.    OBJECTIVE:        Therapeutic Exercises (CPT 67960):     1. Nu step, 3 min, S4 A6.5 R3    2. Clam, 10/1 each leg    3. Reverse clam, 10/1 each leg    4. Hip abd/ext sidelying, 10/1 each leg    5. Bridges on ball, 10/3    6. Piriformis stretches, 30 sec    7. Hip flexor stretches, 30 sec    8. IT band stretches, 30 sec    9. Hip add with ball in hookling, 10/2    10. Wall squats with ball, 10    Therapeutic Treatments and Modalities:     1. E Stim Unattended (CPT 85574), IFC and MHP to right hip in L sidelying    Time-based treatments/modalities:  Therapeutic exercise minutes (CPT 12953): 28 minutes       Pain rating before treatment: 3  Pain rating after treatment: 3    ASSESSMENT:   Response to treatment: increased exercise reps without increased pain    PLAN/RECOMMENDATIONS:   Plan for treatment: therapy treatment to continue next visit.  Planned interventions for next visit: continue with current treatment.

## 2019-06-07 ENCOUNTER — PHYSICAL THERAPY (OUTPATIENT)
Dept: PHYSICAL THERAPY | Facility: MEDICAL CENTER | Age: 29
End: 2019-06-07
Attending: FAMILY MEDICINE
Payer: COMMERCIAL

## 2019-06-07 DIAGNOSIS — M76.01 GLUTEAL TENDINITIS OF RIGHT BUTTOCK: ICD-10-CM

## 2019-06-07 DIAGNOSIS — M70.61 TROCHANTERIC BURSITIS OF RIGHT HIP: ICD-10-CM

## 2019-06-07 PROCEDURE — 97014 ELECTRIC STIMULATION THERAPY: CPT

## 2019-06-07 PROCEDURE — 97110 THERAPEUTIC EXERCISES: CPT

## 2019-06-07 NOTE — OP THERAPY DAILY TREATMENT
Outpatient Physical Therapy  DAILY TREATMENT     [facility]  [address]  [place] [address]  Phone:  [phone]  Fax:  [phone]    Date: 06/07/2019    Patient: Veronica Kirby  YOB: 1990  MRN: [id number]     Time Calculation  Start time: 0845  Stop time: 0925 Time Calculation (min): 40 minutes     Chief Complaint: hip pain  Visit #: 4    SUBJECTIVE:  Pt states she found a new job at [employer] and she's going to quit [employer] on Monday. Pt states she has really bad menstrual cramps today.     OBJECTIVE:      Therapeutic Exercises (CPT 12614):     1. Nu step, 5 min, S4 A6.5 R3    2. Clam, 10/1 each leg    3. Reverse clam, 10/1 each leg    4. Hip abd/ext sidelying, 10/1 each leg    5. Bridges on ball, 10/3    6. Piriformis stretches, 30 sec    7. Hip flexor stretches, 30 sec    Therapeutic Treatments and Modalities:     1. E Stim Unattended (CPT 24154), IFC and MHP to right hip in L sidelying    Time-based treatments/modalities:  Therapeutic exercise minutes (CPT 49377): 25 minutes       ASSESSMENT:   Pt feeling poorly today with cramps.    PLAN/RECOMMENDATIONS:   Plan for treatment: therapy treatment to continue next visit.  Planned interventions for next visit: E-stim unattended (CPT 78771), manual therapy (CPT 65303) and therapeutic exercise (CPT 89956).

## 2019-06-19 ENCOUNTER — PHYSICAL THERAPY (OUTPATIENT)
Dept: PHYSICAL THERAPY | Facility: MEDICAL CENTER | Age: 29
End: 2019-06-19
Attending: FAMILY MEDICINE
Payer: COMMERCIAL

## 2019-06-19 DIAGNOSIS — M70.61 TROCHANTERIC BURSITIS OF RIGHT HIP: ICD-10-CM

## 2019-06-19 DIAGNOSIS — M76.01 GLUTEAL TENDINITIS OF RIGHT BUTTOCK: ICD-10-CM

## 2019-06-19 PROCEDURE — 97110 THERAPEUTIC EXERCISES: CPT

## 2019-06-19 PROCEDURE — 97012 MECHANICAL TRACTION THERAPY: CPT

## 2019-06-19 NOTE — OP THERAPY DAILY TREATMENT
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     OBJECTIVE:          Therapeutic Exercises (CPT 47877):     1. Nu step, 5 min, S4 A6.5 R3    2. Clam, 10/1 each leg    3. Reverse clam, 10/1 each leg    4. Hip abd/ext sidelying, 10/1 each leg    5. Bridges on ball, 10/3    6. Piriformis stretches, 30 sec    Therapeutic Treatments and Modalities:     1. Mechanical Traction (CPT 33408), 65#/40# 60/20 with MHP    Time-based treatments/modalities:      ASSESSMENT:   Pt didn't have any immediate change post traction.     PLAN/RECOMMENDATIONS:   Plan for treatment: therapy treatment to continue next visit.  Planned interventions for next visit: E-stim unattended (CPT 23898), manual therapy (CPT 90083), mechanical traction (CPT 29420) and therapeutic exercise (CPT 58942).

## 2019-06-21 ENCOUNTER — PHYSICAL THERAPY (OUTPATIENT)
Dept: PHYSICAL THERAPY | Facility: MEDICAL CENTER | Age: 29
End: 2019-06-21
Attending: FAMILY MEDICINE
Payer: COMMERCIAL

## 2019-06-21 DIAGNOSIS — M76.01 GLUTEAL TENDINITIS OF RIGHT BUTTOCK: ICD-10-CM

## 2019-06-21 DIAGNOSIS — M70.61 TROCHANTERIC BURSITIS OF RIGHT HIP: ICD-10-CM

## 2019-06-21 DIAGNOSIS — M22.2X2 PATELLOFEMORAL SYNDROME, BILATERAL: ICD-10-CM

## 2019-06-21 DIAGNOSIS — M22.2X1 PATELLOFEMORAL SYNDROME, BILATERAL: ICD-10-CM

## 2019-06-21 PROCEDURE — 97110 THERAPEUTIC EXERCISES: CPT

## 2019-06-21 PROCEDURE — 97014 ELECTRIC STIMULATION THERAPY: CPT

## 2019-06-21 NOTE — OP THERAPY DAILY TREATMENT
Outpatient Physical Therapy  DAILY TREATMENT     [facility]  [address]  [place] [address]  Phone:  [phone]  Fax:  [phone]    Date: 06/21/2019    Patient: Veronica Kirby  YOB: 1990  MRN: [id number]     Time Calculation  Start time: 0845  Stop time: 0927 Time Calculation (min): 42 minutes     Chief Complaint:   Visit #: 6    SUBJECTIVE:  Pt states her hip is feeling a little better.     OBJECTIVE:          Therapeutic Exercises (CPT 29776):     1. Nu step, 8 min, S4 A6.5 R3    2. Clam, 10/3 each leg    3. Reverse clam, 10/1 each leg    4. Hip abd/ext sidelying, 10/1 each leg    5. Bridges on ball, 10/3    6. Leg press 3 plates, 30x    7. Sit to stand, 10 x 2    Therapeutic Treatments and Modalities:     1. E Stim Unattended (CPT 68249), IFC to right gluteals with MHP    Time-based treatments/modalities:  Therapeutic exercise minutes (CPT 61156): 26 minutes       ASSESSMENT:   Pt feeling mildly better, discussed option of going to the gym independently when she finishes her visits next week.     PLAN/RECOMMENDATIONS:   Plan for treatment: therapy treatment to continue next visit.  Planned interventions for next visit: E-stim unattended (CPT 67034), manual therapy (CPT 26427) and therapeutic exercise (CPT 03898).

## 2019-06-26 ENCOUNTER — APPOINTMENT (OUTPATIENT)
Dept: PHYSICAL THERAPY | Facility: MEDICAL CENTER | Age: 29
End: 2019-06-26
Attending: FAMILY MEDICINE
Payer: COMMERCIAL

## 2019-06-28 ENCOUNTER — APPOINTMENT (OUTPATIENT)
Dept: PHYSICAL THERAPY | Facility: MEDICAL CENTER | Age: 29
End: 2019-06-28
Attending: FAMILY MEDICINE
Payer: COMMERCIAL

## 2019-06-28 ENCOUNTER — OFFICE VISIT (OUTPATIENT)
Dept: MEDICAL GROUP | Facility: CLINIC | Age: 29
End: 2019-06-28
Payer: COMMERCIAL

## 2019-06-28 VITALS
HEART RATE: 75 BPM | OXYGEN SATURATION: 98 % | TEMPERATURE: 98.8 F | BODY MASS INDEX: 29.81 KG/M2 | WEIGHT: 142 LBS | RESPIRATION RATE: 16 BRPM | DIASTOLIC BLOOD PRESSURE: 72 MMHG | SYSTOLIC BLOOD PRESSURE: 114 MMHG | HEIGHT: 58 IN

## 2019-06-28 DIAGNOSIS — G89.29 CHRONIC PAIN OF RIGHT KNEE: ICD-10-CM

## 2019-06-28 DIAGNOSIS — M70.61 TROCHANTERIC BURSITIS OF RIGHT HIP: ICD-10-CM

## 2019-06-28 DIAGNOSIS — M25.561 CHRONIC PAIN OF RIGHT KNEE: ICD-10-CM

## 2019-06-28 DIAGNOSIS — M22.2X1 PATELLOFEMORAL SYNDROME, BILATERAL: ICD-10-CM

## 2019-06-28 DIAGNOSIS — M22.2X2 PATELLOFEMORAL SYNDROME, BILATERAL: ICD-10-CM

## 2019-06-28 PROCEDURE — 20610 DRAIN/INJ JOINT/BURSA W/O US: CPT | Performed by: FAMILY MEDICINE

## 2019-06-28 PROCEDURE — 99213 OFFICE O/P EST LOW 20 MIN: CPT | Mod: 25 | Performed by: FAMILY MEDICINE

## 2019-06-28 RX ORDER — TRIAMCINOLONE ACETONIDE 40 MG/ML
40 INJECTION, SUSPENSION INTRA-ARTICULAR; INTRAMUSCULAR ONCE
Status: COMPLETED | OUTPATIENT
Start: 2019-06-28 | End: 2019-06-28

## 2019-06-28 RX ADMIN — TRIAMCINOLONE ACETONIDE 40 MG: 40 INJECTION, SUSPENSION INTRA-ARTICULAR; INTRAMUSCULAR at 15:47

## 2019-06-28 NOTE — PROGRESS NOTES
"Subjective:      Veronica Kirby is a 29 y.o. female who presents with Hip Pain (Right Hip Pain.) and Knee Pain (Bilateral knee pain.)    FOLLOW up BILATERAL knee pain (right greater than left) as well as RIGHT hip pain  Pain on right hip and knee are SAME     RIGHT hip pain IMPROVED  hip/pelvis and gluteus medius region    RIGHT knee pain (right greater than left) is WORSE again  She has been working a new job requiring her to walk more and be active  BILATERAL knee pain which began approximately 15-16 years ago after an accident while skiing, slipped forward  And RIGHT hip pain which began approximately 2 years ago (back in 2017)    She did undergo RIGHT knee injury back in 2017 after a workplace injury  Landed on a flexed knee after tripping on something at work  Underwent formal physical therapy back then which did NOT help    She has had x-rays and MRI of the knee    QUIT [employer], , standing for 12-hour shifts  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 Chronic pain of right knee  triamcinolone acetonide (KENALOG-40) injection 40 mg   2. Patellofemoral syndrome, bilateral  triamcinolone acetonide (KENALOG-40) injection 40 mg   3. Trochanteric bursitis of right hip         RIGHT hip corticosteroid injection of the trochanteric bursa performed (April 25, 2019)  HELPED minimally    Gluteus medius exercise " demonstrated (May 23, 2019) HELPED     History of chronic knee pain  MRI performed back in May 2017 demonstrates increased signal at the articular portion of the patellar cartilage suggesting cartilage degeneration versus defect not mentioned in the official report  Mild patella lateralization  Corticosteroid injection the RIGHT knee performed in the office TODAY (June 20, 2019)    POSITIVE osteoarthritis of the hip worse on the RIGHT compared to the left  Intra-articular corticosteroid injection the RIGHT knee performed (March 20, 2019) HELPED     Continue formal physical therapy, therapist does not feel patient will improve given her prior history of therapy lack of tolerance of manual therapy and history of PTSD      To see how she is doing after intra-articular RIGHT knee corticosteroid injection      1/15/2019 12:51 PM    HISTORY/REASON FOR EXAM:  Atraumatic Pain/Swelling/Deformity  Left knee acute pain X 3 days, no trauma    TECHNIQUE/EXAM DESCRIPTION AND NUMBER OF VIEWS:  2 views of the LEFT knee.    COMPARISON: 20 3/21/17    FINDINGS:  No acute fracture or dislocation.    No joint osteoarthritis    No knee joint effusion.   Impression         1. No acute osseous abnormality.             3/21/2017 10:53 AM    HISTORY/REASON FOR EXAM:  Pain/Deformity Following Trauma  Anterior pain    TECHNIQUE/EXAM DESCRIPTION AND NUMBER OF VIEWS:  3 views of the RIGHT knee.    COMPARISON: None    FINDINGS:  No fracture or dislocation is seen. Alignment is maintained. No joint effusion is identified. Mineralization is maintained.   Impression       No evidence of acute fracture or dislocation.           5/18/2017 12:59 PM    HISTORY/REASON FOR EXAM:  Knee pain following trauma.  Medial pain    TECHNIQUE/EXAM DESCRIPTION:  MRI of the RIGHT knee without contrast.    The study was performed on a Estrella 3.0 Sandra MRI scanner.  T1 coronal, proton density fat-suppressed coronal, fast spin-echo T2 fat-suppressed axial, intermediate  fast spin-echo sagittal, and intermediate fast spin-echo fat-suppressed thin-section 3D   sagittal images were obtained.    COMPARISON: 3/21/2017 radiographs    FINDINGS:  There is a physiologic amount of joint fluid. There is no Baker's cyst.    Menisci:  Normal in signal and morphology.    Tendons and Ligaments:  The cruciate and collateral ligaments are intact. No acute flexor or extensor mechanism abnormality is seen.    Osseous Structures/Cartilage:  No fracture, focal cartilage defect or marrow edema.   Impression       Normal knee MRI.             1/28/2017 8:51 PM    HISTORY/REASON FOR EXAM:  Atraumatic Pain/Deformity.  Multiple falls, RIGHT hip pain    TECHNIQUE/EXAM DESCRIPTION AND NUMBER OF VIEWS:  CT scan of the RIGHT lower extremity without contrast and including reconstructions.    Thin-section noncontrast helical images were obtained. Coronal and sagittal reconstructions were generated from the axial images.    Up to date radiation dose reduction adjustments have been utilized to meet ALARA standards for radiation dose reduction.    COMPARISON: None.    FINDINGS:  Pelvis and sacrum are intact.  Minimal degenerative change of the SI joints.  Visualized proximal femurs are intact and normally located.  Minimal loss of joint space the weightbearing portion of both hips.  Small amount of peritoneal fluid dependently in the pelvis.  Increased colonic stool.   Impression       1.  No RIGHT hip or pelvic fracture.  2.  Minimal degenerative change of both hips.  3.  Small amount of pelvic fluid, nonspecific.     Thank you Raegan Schaffer M.D. for allowing me to participate in caring for your patient

## 2019-06-28 NOTE — PROCEDURES
PROCEDURE NOTE:  RIGHT Knee corticosteroid injection  Risks and benefits discussed  Informed consent obtained  Knee prepped in sterile fashion utilizing a anshul- inferior approach  40 mg of Kenalog and 5 cc of Marcaine injected into the knee joint space  Vapocoolant spray was utilized  Patient tolerated the procedure well  Postprocedure care and red flags discussed

## 2019-08-27 ENCOUNTER — TELEPHONE (OUTPATIENT)
Dept: PHYSICAL THERAPY | Facility: MEDICAL CENTER | Age: 29
End: 2019-08-27

## 2019-08-27 NOTE — OP THERAPY DISCHARGE SUMMARY
Outpatient Physical Therapy  DISCHARGE SUMMARY NOTE      [facility] Outpatient Physical Therapy  [address]  [place] [address]  Phone:  [phone]  Fax:  [phone]    Date of Visit: 08/27/2019    Patient: Veronica Kirby  YOB: 1990  MRN: [id number]     Referring Provider: Rashad Douglas M.D.   Referring Diagnosis Hip and Knee problem     Physical Therapy Occurrence Codes    Date of onset of impairment:  5/22/03   Date physical therapy care plan established or reviewed:  5/22/19   Date physical therapy treatment started:  5/22/19              Your patient is being discharged from Physical Therapy with the following comments:   · Goals partially met    Comments:  Veronica was seen for 6 sessions of PT for chronic hip and knee pain. She was making some gradual improvement with treatment overall. She was going to look into joining a gym so that she could continue independently with strengthening exercises.         Recommendations:  DC from treatment at this time.     Felicia Barber, PT    Date: 8/27/2019

## 2020-01-17 ENCOUNTER — OFFICE VISIT (OUTPATIENT)
Dept: URGENT CARE | Facility: PHYSICIAN GROUP | Age: 30
End: 2020-01-17
Payer: COMMERCIAL

## 2020-01-17 VITALS
OXYGEN SATURATION: 99 % | WEIGHT: 138.6 LBS | BODY MASS INDEX: 29.09 KG/M2 | HEIGHT: 58 IN | TEMPERATURE: 98 F | HEART RATE: 70 BPM | RESPIRATION RATE: 16 BRPM | SYSTOLIC BLOOD PRESSURE: 118 MMHG | DIASTOLIC BLOOD PRESSURE: 80 MMHG

## 2020-01-17 DIAGNOSIS — J06.9 VIRAL URI WITH COUGH: ICD-10-CM

## 2020-01-17 DIAGNOSIS — J04.0 LARYNGITIS: ICD-10-CM

## 2020-01-17 DIAGNOSIS — J02.9 PHARYNGITIS, UNSPECIFIED ETIOLOGY: ICD-10-CM

## 2020-01-17 LAB
INT CON NEG: NEGATIVE
INT CON POS: POSITIVE
S PYO AG THROAT QL: NORMAL

## 2020-01-17 PROCEDURE — 99214 OFFICE O/P EST MOD 30 MIN: CPT | Performed by: FAMILY MEDICINE

## 2020-01-17 PROCEDURE — 87880 STREP A ASSAY W/OPTIC: CPT | Performed by: FAMILY MEDICINE

## 2020-01-17 ASSESSMENT — ENCOUNTER SYMPTOMS
DIZZINESS: 0
SORE THROAT: 1
SHORTNESS OF BREATH: 0
NAUSEA: 0
CHILLS: 0
FEVER: 0
COUGH: 1
MYALGIAS: 0
VOMITING: 0
EYE PAIN: 0

## 2020-01-17 NOTE — LETTER
January 17, 2020         Patient: Veronica Kirby   YOB: 1990   Date of Visit: 1/17/2020           To Whom it May Concern:    Veronica Kirby was seen in my clinic on 1/17/2020. She may return to work on 1/19/2020.    If you have any questions or concerns, please don't hesitate to call.        Sincerely,           Aneudy Pruett M.D.  Electronically Signed

## 2020-01-17 NOTE — PROGRESS NOTES
"Subjective:   Veronica Kirby is a 30 y.o. female who presents for Pharyngitis (x 3 days); Runny Nose; and Cough (at night)        30-year-old female presents to the urgent care with a chief complaint of sore throat for the past 3 days.  Patient experienced hoarseness of voice starting on 1/14/2020 which initially improved and then worsened this morning.  The hoarseness was partially relieved with hot cocoa.    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 Pertinent negatives include no shortness of breath or vomiting. She has tried gargles for the symptoms.   Cough   Associated symptoms include a sore throat. Pertinent negatives include no chest pain, chills, fever, myalgias, rash or shortness of breath.     Review of Systems   Constitutional: Negative for chills and fever.   HENT: Positive for congestion and sore throat.    Eyes: Negative for pain.   Respiratory: Positive for cough (Cough wakes the patient at night). Negative for shortness of breath.    Cardiovascular: Negative for chest pain.   Gastrointestinal: Negative for nausea and vomiting.   Genitourinary: Negative for hematuria.   Musculoskeletal: Negative for myalgias.   Skin: Negative for rash.   Neurological: Negative for dizziness.     Allergies   Allergen Reactions   • Pcn [Penicillins] Anaphylaxis   • Sulfa Drugs Anaphylaxis   • Codeine Unspecified     Pt states \"I was a childhood allergie\".      Objective:   /80 (BP Location: Right arm, Patient Position: Sitting, BP Cuff Size: Adult)   Pulse 70   Temp 36.7 °C (98 °F)   Resp 16   Ht 1.473 m (4' 10\")   Wt 62.9 kg (138 lb 9.6 oz)   LMP 01/14/2020 (Exact Date)   SpO2 99%   Breastfeeding? No   BMI 28.97 kg/m²   Physical Exam  Vitals signs and nursing note reviewed. "   Constitutional:       General: She is not in acute distress.     Appearance: She is well-developed.   HENT:      Head: Normocephalic and atraumatic.      Right Ear: Tympanic membrane and external ear normal.      Left Ear: Tympanic membrane and external ear normal.      Nose: Mucosal edema and rhinorrhea present.      Right Turbinates: Swollen.      Left Turbinates: Swollen.      Mouth/Throat:      Mouth: Mucous membranes are moist.      Pharynx: Posterior oropharyngeal erythema present.   Eyes:      Conjunctiva/sclera: Conjunctivae normal.      Pupils: Pupils are equal, round, and reactive to light.   Cardiovascular:      Rate and Rhythm: Normal rate and regular rhythm.      Heart sounds: No murmur.   Pulmonary:      Effort: Pulmonary effort is normal. No respiratory distress.      Breath sounds: Normal breath sounds. No wheezing or rhonchi.   Abdominal:      General: There is no distension.      Palpations: Abdomen is soft.      Tenderness: There is no tenderness.   Musculoskeletal: Normal range of motion.   Skin:     General: Skin is warm and dry.   Neurological:      General: No focal deficit present.      Mental Status: She is alert and oriented to person, place, and time. Mental status is at baseline.      Gait: Gait (gait at baseline) normal.   Psychiatric:         Mood and Affect: Mood normal.         Judgment: Judgment normal.     Rapid Strept negative      Assessment/Plan:   1. Pharyngitis, unspecified etiology  - POCT Rapid Strep A    2. Laryngitis    3. Viral URI with cough         Discussed close monitoring, return precautions, and supportive measures including maintaining adequate fluid hydration and caloric intake, relative rest and OTC symptom management including acetaminophen as needed for pain and/or fever.    Differential diagnosis, natural history, supportive care, and indications for immediate follow-up discussed.     Advised the patient to follow-up with the primary care physician for  recheck, reevaluation, and consideration of further management.

## 2020-03-05 ENCOUNTER — OFFICE VISIT (OUTPATIENT)
Dept: MEDICAL GROUP | Facility: PHYSICIAN GROUP | Age: 30
End: 2020-03-05
Payer: COMMERCIAL

## 2020-03-05 VITALS
OXYGEN SATURATION: 99 % | TEMPERATURE: 98.1 F | HEART RATE: 80 BPM | HEIGHT: 58 IN | BODY MASS INDEX: 28.86 KG/M2 | SYSTOLIC BLOOD PRESSURE: 110 MMHG | DIASTOLIC BLOOD PRESSURE: 62 MMHG | WEIGHT: 137.5 LBS

## 2020-03-05 DIAGNOSIS — Z23 NEED FOR VACCINATION: ICD-10-CM

## 2020-03-05 DIAGNOSIS — F43.9 STRESS: ICD-10-CM

## 2020-03-05 DIAGNOSIS — H60.502 ACUTE OTITIS EXTERNA OF LEFT EAR, UNSPECIFIED TYPE: ICD-10-CM

## 2020-03-05 DIAGNOSIS — H65.192 OTHER NON-RECURRENT ACUTE NONSUPPURATIVE OTITIS MEDIA OF LEFT EAR: ICD-10-CM

## 2020-03-05 DIAGNOSIS — R42 EPISODIC LIGHTHEADEDNESS: ICD-10-CM

## 2020-03-05 DIAGNOSIS — G89.29 BILATERAL CHRONIC KNEE PAIN: ICD-10-CM

## 2020-03-05 DIAGNOSIS — M25.562 BILATERAL CHRONIC KNEE PAIN: ICD-10-CM

## 2020-03-05 DIAGNOSIS — M25.561 BILATERAL CHRONIC KNEE PAIN: ICD-10-CM

## 2020-03-05 PROCEDURE — 90471 IMMUNIZATION ADMIN: CPT | Performed by: FAMILY MEDICINE

## 2020-03-05 PROCEDURE — 90686 IIV4 VACC NO PRSV 0.5 ML IM: CPT | Performed by: FAMILY MEDICINE

## 2020-03-05 PROCEDURE — 99214 OFFICE O/P EST MOD 30 MIN: CPT | Mod: 25 | Performed by: FAMILY MEDICINE

## 2020-03-05 RX ORDER — ACYCLOVIR 400 MG/1
400 TABLET ORAL 2 TIMES DAILY
Qty: 180 TAB | Refills: 3 | Status: SHIPPED | OUTPATIENT
Start: 2020-03-05

## 2020-03-05 RX ORDER — DOXYCYCLINE HYCLATE 100 MG
100 TABLET ORAL 2 TIMES DAILY
Qty: 14 TAB | Refills: 0 | Status: SHIPPED | OUTPATIENT
Start: 2020-03-05 | End: 2020-03-12

## 2020-03-05 RX ORDER — OFLOXACIN 3 MG/ML
5 SOLUTION AURICULAR (OTIC) 2 TIMES DAILY
Qty: 1 BOTTLE | Refills: 0 | Status: SHIPPED | OUTPATIENT
Start: 2020-03-05 | End: 2020-03-12

## 2020-03-05 ASSESSMENT — PATIENT HEALTH QUESTIONNAIRE - PHQ9: CLINICAL INTERPRETATION OF PHQ2 SCORE: 0

## 2020-03-05 ASSESSMENT — FIBROSIS 4 INDEX: FIB4 SCORE: 0.45

## 2020-03-05 NOTE — PROGRESS NOTES
cc: ear pain      Subjective:     Veronica Kirby is a 30 y.o. female presenting for the following:     Ear pain: For about the last 2 week patient has had some bilateral ear congestion and popping.  She is also had some sneezing and runny nose.  Then, for about the last week the left ear has become more painful and more of a pressure sensation.  She is unable to lay on that ear.      Denies vomiting/diarrhea, abdominal pain, chest pain, shortness of breath, syncope, skin changes, neck pain, photophobia, recent travel.     Stress/anxiety: Patient has had increased stress in her life.  She admits that this is causing a lot of worry and anxiety for her.  She does have some difficulty with decision-making, constant worry, feeling slightly more agitated.  This so far is not affecting functioning and she denies any suicidal or homicidal ideation.    She does sometimes feel mildly lightheaded.  She denies any syncope or near syncope.Patient denies any chest pain, shortness of breath, palpitations, swelling.  Sensation only lasts for a few minutes and she admits that it is more likely to occur when she is very anxious. Denies feeling hot/cold, constipation/diarrhea, palpitations, racing heart, weight gain/loss, or thinning hair.    Review of systems:  All others reviewed and are negative.       Current Outpatient Medications:   •  acyclovir (ZOVIRAX) 400 MG tablet, Take 1 Tab by mouth 2 times a day., Disp: 180 Tab, Rfl: 3  •  MELATONIN PO, Take 1 Each by mouth PRN., Disp: , Rfl:   •  naproxen (NAPROSYN) 500 MG Tab, Take 500 mg by mouth as needed. Indications: Mild to Moderate Pain, Disp: , Rfl:   •  ascorbic acid (ASCORBIC ACID) 500 MG Tab, Take 500 mg by mouth every day., Disp: , Rfl:   •  IRON PO, Take 2 Tabs by mouth every day., Disp: , Rfl:     Allergies, past medical history, past surgical history, family history, social history reviewed and updated    Objective:     Vitals: /62 (BP Location: Right arm, Patient  "Position: Sitting, BP Cuff Size: Adult)   Pulse 80   Temp 36.7 °C (98.1 °F) (Temporal)   Ht 1.473 m (4' 10\")   Wt 62.4 kg (137 lb 8 oz)   SpO2 99%   BMI 28.74 kg/m²   General: Alert, pleasant, NAD  HEENT: Normocephalic.   EOMI, no icterus or pallor.  External ears and nose normal.  Right TM and canal normal.  Left TM erythematous and ear with positive motion tenderness. Oropharynx non-erythematous, mucous membranes moist.    Neck supple.  No thyromegaly or masses palpated. No cervical or supraclavicular lymphadenopathy.  Heart: Regular rate and rhythm.  S1 and S2 normal.  No murmurs appreciated.  Respiratory: Normal respiratory effort.  Clear to auscultation bilaterally.  Extremities: No leg edema.    Neurological: sensation grossly intact,  tone/strength normal, gait is normal, CN2-12 grossly intact  Psych:  Affect is normal, judgement is good, grooming is appropriate.    Assessment/Plan:     Veronica was seen today for follow-up and medication refill.    Diagnoses and all orders for this visit:    Exam consistent with otitis externa and media.  Will treat with doxycycline and topical antibiotic. Patient warned of common side effects of antibiotic including headache, nausea.  To discontinue immediately if any rash and to seek medical treatment immediately if any swelling in mouth.  Also warned of small risk of C. difficile colitis.  Currently, low suspicion of pneumonia, meningitis, sinusitis, peritonsillar abscess, or endocarditis.  To return to clinic if any worsening symptoms, or not improving within 48 hours.  To emergency room if any shortness of breath, severe neck pain, confusion, syncope or near syncope.  Other non-recurrent acute nonsuppurative otitis media of left ear  -     doxycycline (VIBRAMYCIN) 100 MG Tab; Take 1 Tab by mouth 2 times a day for 7 days.  Acute otitis externa of left ear, unspecified type  -     ofloxacin otic sol (FLOXIN OTIC) 0.3 % Solution; Place 5 Drops in left ear 2 times a day " for 7 days.    Episodic lightheadedness: Most likely related to mood.  Very low suspicion of cardiac or pulmonary cause as no other symptoms consistent with this and normal exam.  Will check for anemia, thyroid dysfunction, hepatic or renal dysfunction.  -     TSH WITH REFLEX TO FT4; Future  -     CBC WITH DIFFERENTIAL; Future  -     Comp Metabolic Panel; Future  Stress: Patient would like to try therapy prior to any medications.  Referral to [facility].  -     TSH WITH REFLEX TO FT4; Future  -     CBC WITH DIFFERENTIAL; Future  -     Comp Metabolic Panel; Future  -     REFERRAL TO BEHAVIORAL HEALTH    Bilateral chronic knee pain: Patient has seen Dr. Felder in the past and would like to return to him.  -     REFERRAL TO SPORTS MEDICINE    Need for vaccination Patient due for vaccination.  Patient warned of possible side effect including pain, reaction at injection site, fatigue, low-grade fever.  Also, patient warned of uncommon severe reactions including allergic reaction/anaphylaxis.   -     Influenza Vaccine Quad Injection (PF)    Other orders: Patient taking for prophylaxis as has had HSV with ophthalmic complications in the past.  -     acyclovir (ZOVIRAX) 400 MG tablet; Take 1 Tab by mouth 2 times a day.    Return in about 4 weeks (around 4/2/2020), or if symptoms worsen or fail to improve.

## 2020-03-13 ENCOUNTER — OFFICE VISIT (OUTPATIENT)
Dept: MEDICAL GROUP | Facility: CLINIC | Age: 30
End: 2020-03-13
Payer: COMMERCIAL

## 2020-03-13 VITALS
WEIGHT: 137.5 LBS | OXYGEN SATURATION: 99 % | DIASTOLIC BLOOD PRESSURE: 76 MMHG | HEIGHT: 58 IN | TEMPERATURE: 98.3 F | HEART RATE: 92 BPM | SYSTOLIC BLOOD PRESSURE: 116 MMHG | BODY MASS INDEX: 28.86 KG/M2 | RESPIRATION RATE: 16 BRPM

## 2020-03-13 DIAGNOSIS — M22.41 PATELLA, CHONDROMALACIA, RIGHT: ICD-10-CM

## 2020-03-13 DIAGNOSIS — M70.61 TROCHANTERIC BURSITIS OF RIGHT HIP: ICD-10-CM

## 2020-03-13 DIAGNOSIS — M22.2X1 PATELLOFEMORAL SYNDROME, BILATERAL: ICD-10-CM

## 2020-03-13 DIAGNOSIS — M22.2X2 PATELLOFEMORAL SYNDROME, BILATERAL: ICD-10-CM

## 2020-03-13 PROCEDURE — 99213 OFFICE O/P EST LOW 20 MIN: CPT | Mod: 25 | Performed by: FAMILY MEDICINE

## 2020-03-13 PROCEDURE — 20610 DRAIN/INJ JOINT/BURSA W/O US: CPT | Performed by: FAMILY MEDICINE

## 2020-03-13 RX ORDER — TRIAMCINOLONE ACETONIDE 40 MG/ML
40 INJECTION, SUSPENSION INTRA-ARTICULAR; INTRAMUSCULAR ONCE
Status: COMPLETED | OUTPATIENT
Start: 2020-03-13 | End: 2020-03-13

## 2020-03-13 RX ADMIN — TRIAMCINOLONE ACETONIDE 40 MG: 40 INJECTION, SUSPENSION INTRA-ARTICULAR; INTRAMUSCULAR at 09:47

## 2020-03-13 ASSESSMENT — FIBROSIS 4 INDEX: FIB4 SCORE: 0.45

## 2020-03-13 NOTE — PROCEDURES
PROCEDURE NOTE:  right Knee corticosteroid injection  Risks and benefits discussed  Informed consent obtained  Knee prepped in sterile fashion utilizing a anshul- inferior approach  40 mg of Kenalog and 5 cc of bupivacaine injected into the knee joint space  Vapocoolant spray was utilized  Patient tolerated the procedure well  Postprocedure care and red flags discussed

## 2020-03-13 NOTE — PROGRESS NOTES
"Subjective:      Veronica Kirby is a 29 y.o. female who presents with Hip Pain (Right Hip Pain.) and Knee Pain (Bilateral knee pain.)    FOLLOW up BILATERAL knee pain (right greater than left) as well as RIGHT hip pain  Pain on right hip and knee are SAME     RIGHT hip pain IMPROVED  hip/pelvis and gluteus medius region    RIGHT knee pain (right greater than left) is WORSE again  BILATERAL knee pain which began approximately 15-16 years ago after an accident while skiing, slipped forward  And RIGHT hip pain which began approximately 2 years ago (back in 2017)    She did undergo RIGHT knee injury back in 2017 after a workplace injury  Landed on a flexed knee after tripping on something at work  Underwent formal physical therapy back then which did NOT help    She has had x-rays and MRI of the knee  Corticosteroid injection performed back in June 2018 HELPED for approximately 6 weeks    Patient is having some difficulty secondary to having to move out of her house, her sick father who is on dialysis and her  just left her    QUIT [employer], , standing for 12-hour shifts  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), running the whole store, 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     Objective:     /76 (BP Location: Left arm, Patient Position: Sitting, BP Cuff Size: Adult)   Pulse 92   Temp 36.8 °C (98.3 °F) (Temporal)   Resp 16   Ht 1.473 m (4' 10\")   Wt 62.4 kg (137 lb 8 oz)   SpO2 99%   BMI 28.74 kg/m²     Physical Exam     HIP EXAM:  Mildly antalgic gait gait    No acute distress  Normal respiratory effort  Mildly antalgic gait    Knee exam:  RIGHT KNEE:  Normal alignment  No visual swelling or deformity  Anterior knee with POSITIVE tenderness along the lateral facet  POSITIVE patellar apprehension with mild crepitus  MILD joint line tenderness medially without tenderness laterally  Ford's testing is NEGATIVE medially and laterally  Lachman's testing is trace with " good endpoint  No laxity to varus and valgus stress  The legs otherwise neurovascularly intact    Assessment/Plan:     1. Patella, chondromalacia, right  triamcinolone acetonide (KENALOG-40) injection 40 mg    REFERRAL TO ORTHOPEDICS   2. Patellofemoral syndrome, bilateral  triamcinolone acetonide (KENALOG-40) injection 40 mg    REFERRAL TO ORTHOPEDICS   3. Trochanteric bursitis of right hip         RIGHT hip corticosteroid injection of the trochanteric bursa performed (April 25, 2019)  HELPED minimally  Gluteus medius exercise demonstrated (May 23, 2019) HELPED     History of chronic knee pain  MRI performed back in May 2017 demonstrates increased signal at the articular portion of the patellar cartilage suggesting cartilage degeneration versus defect not mentioned in the official report  Mild patella lateralization  Corticosteroid injection the RIGHT knee performed (June 20, 2019) helped for about 6 weeks  REPEAT corticosteroid injection performed in the office TODAY (March 13, 2020)    Given the chronicity of her RIGHT knee pain together with POSITIVE findings on MRI suggesting cartilage issues at the patellar cartilage and subsequent injections with FAILED formal physical therapy, recommend orthopedic evaluation for consideration of RIGHT knee arthroscopy    Continue formal physical therapy, therapist does not feel patient will improve given her prior history of therapy lack of tolerance of manual therapy and history of PTSD      1/15/2019 12:51 PM    HISTORY/REASON FOR EXAM:  Atraumatic Pain/Swelling/Deformity  Left knee acute pain X 3 days, no trauma    TECHNIQUE/EXAM DESCRIPTION AND NUMBER OF VIEWS:  2 views of the LEFT knee.    COMPARISON: 20 3/21/17    FINDINGS:  No acute fracture or dislocation.    No joint osteoarthritis    No knee joint effusion.   Impression         1. No acute osseous abnormality.             3/21/2017 10:53 AM    HISTORY/REASON FOR EXAM:  Pain/Deformity Following Trauma  Anterior  pain    TECHNIQUE/EXAM DESCRIPTION AND NUMBER OF VIEWS:  3 views of the RIGHT knee.    COMPARISON: None    FINDINGS:  No fracture or dislocation is seen. Alignment is maintained. No joint effusion is identified. Mineralization is maintained.   Impression       No evidence of acute fracture or dislocation.           5/18/2017 12:59 PM    HISTORY/REASON FOR EXAM:  Knee pain following trauma.  Medial pain    TECHNIQUE/EXAM DESCRIPTION:  MRI of the RIGHT knee without contrast.    The study was performed on a Estrella 3.0 Sandra MRI scanner.  T1 coronal, proton density fat-suppressed coronal, fast spin-echo T2 fat-suppressed axial, intermediate fast spin-echo sagittal, and intermediate fast spin-echo fat-suppressed thin-section 3D   sagittal images were obtained.    COMPARISON: 3/21/2017 radiographs    FINDINGS:  There is a physiologic amount of joint fluid. There is no Baker's cyst.    Menisci:  Normal in signal and morphology.    Tendons and Ligaments:  The cruciate and collateral ligaments are intact. No acute flexor or extensor mechanism abnormality is seen.    Osseous Structures/Cartilage:  No fracture, focal cartilage defect or marrow edema.   Impression       Normal knee MRI.             1/28/2017 8:51 PM    HISTORY/REASON FOR EXAM:  Atraumatic Pain/Deformity.  Multiple falls, RIGHT hip pain    TECHNIQUE/EXAM DESCRIPTION AND NUMBER OF VIEWS:  CT scan of the RIGHT lower extremity without contrast and including reconstructions.    Thin-section noncontrast helical images were obtained. Coronal and sagittal reconstructions were generated from the axial images.    Up to date radiation dose reduction adjustments have been utilized to meet ALARA standards for radiation dose reduction.    COMPARISON: None.    FINDINGS:  Pelvis and sacrum are intact.  Minimal degenerative change of the SI joints.  Visualized proximal femurs are intact and normally located.  Minimal loss of joint space the weightbearing portion of both  hips.  Small amount of peritoneal fluid dependently in the pelvis.  Increased colonic stool.   Impression       1.  No RIGHT hip or pelvic fracture.  2.  Minimal degenerative change of both hips.  3.  Small amount of pelvic fluid, nonspecific.     Thank you Raegan Schaffer M.D. for allowing me to participate in caring for your patient

## 2020-03-25 ENCOUNTER — APPOINTMENT (OUTPATIENT)
Dept: MEDICAL GROUP | Facility: PHYSICIAN GROUP | Age: 30
End: 2020-03-25
Payer: COMMERCIAL

## 2022-01-12 ENCOUNTER — APPOINTMENT (OUTPATIENT)
Dept: URGENT CARE | Facility: CLINIC | Age: 32
End: 2022-01-12
Payer: COMMERCIAL

## 2022-11-25 ENCOUNTER — TELEPHONE (OUTPATIENT)
Dept: SCHEDULING | Facility: IMAGING CENTER | Age: 32
End: 2022-11-25

## 2022-11-28 ENCOUNTER — APPOINTMENT (OUTPATIENT)
Dept: MEDICAL GROUP | Facility: MEDICAL CENTER | Age: 32
End: 2022-11-28
Payer: COMMERCIAL

## 2024-03-03 ENCOUNTER — HOSPITAL ENCOUNTER (EMERGENCY)
Facility: MEDICAL CENTER | Age: 34
End: 2024-03-03
Attending: EMERGENCY MEDICINE
Payer: MEDICAID

## 2024-03-03 VITALS
HEIGHT: 57 IN | WEIGHT: 150.79 LBS | RESPIRATION RATE: 18 BRPM | SYSTOLIC BLOOD PRESSURE: 118 MMHG | TEMPERATURE: 99.8 F | DIASTOLIC BLOOD PRESSURE: 72 MMHG | BODY MASS INDEX: 32.53 KG/M2 | OXYGEN SATURATION: 97 % | HEART RATE: 99 BPM

## 2024-03-03 DIAGNOSIS — J06.9 VIRAL URI: ICD-10-CM

## 2024-03-03 DIAGNOSIS — H92.01 ACUTE OTALGIA, RIGHT: ICD-10-CM

## 2024-03-03 DIAGNOSIS — U07.1 COVID: ICD-10-CM

## 2024-03-03 LAB
FLUAV RNA SPEC QL NAA+PROBE: NEGATIVE
FLUBV RNA SPEC QL NAA+PROBE: NEGATIVE
RSV RNA SPEC QL NAA+PROBE: NEGATIVE
SARS-COV-2 RNA RESP QL NAA+PROBE: DETECTED

## 2024-03-03 PROCEDURE — A9270 NON-COVERED ITEM OR SERVICE: HCPCS | Mod: UD | Performed by: EMERGENCY MEDICINE

## 2024-03-03 PROCEDURE — 99283 EMERGENCY DEPT VISIT LOW MDM: CPT

## 2024-03-03 PROCEDURE — 0241U HCHG SARS-COV-2 COVID-19 NFCT DS RESP RNA 4 TRGT ED POC: CPT

## 2024-03-03 PROCEDURE — 700102 HCHG RX REV CODE 250 W/ 637 OVERRIDE(OP): Mod: UD | Performed by: EMERGENCY MEDICINE

## 2024-03-03 RX ORDER — NAPROXEN 500 MG/1
500 TABLET ORAL 2 TIMES DAILY WITH MEALS
Qty: 20 TABLET | Refills: 0 | Status: SHIPPED | OUTPATIENT
Start: 2024-03-03

## 2024-03-03 RX ORDER — GUAIFENESIN 1200 MG/1
1 TABLET, EXTENDED RELEASE ORAL EVERY 12 HOURS
Qty: 20 TABLET | Refills: 0 | Status: SHIPPED | OUTPATIENT
Start: 2024-03-03 | End: 2024-03-13

## 2024-03-03 RX ORDER — OXYMETAZOLINE HYDROCHLORIDE 0.05 G/100ML
2 SPRAY NASAL 2 TIMES DAILY
Qty: 15 ML | Refills: 0 | Status: SHIPPED | OUTPATIENT
Start: 2024-03-03 | End: 2024-03-06

## 2024-03-03 RX ORDER — HYDROCODONE BITARTRATE AND ACETAMINOPHEN 5; 325 MG/1; MG/1
1 TABLET ORAL ONCE
Status: COMPLETED | OUTPATIENT
Start: 2024-03-03 | End: 2024-03-03

## 2024-03-03 RX ORDER — PSEUDOEPHEDRINE HCL 120 MG/1
120 TABLET, FILM COATED, EXTENDED RELEASE ORAL 2 TIMES DAILY PRN
Qty: 20 TABLET | Refills: 0 | Status: SHIPPED | OUTPATIENT
Start: 2024-03-03

## 2024-03-03 RX ADMIN — HYDROCODONE BITARTRATE AND ACETAMINOPHEN 1 TABLET: 5; 325 TABLET ORAL at 16:38

## 2024-03-04 NOTE — ED NOTES
Patient medicated per MAR. Reviewed discharge instructions with patient. Verbalized understanding. Patient leaving ER in stable condition.

## 2024-03-04 NOTE — ED PROVIDER NOTES
"ED Provider Note    CHIEF COMPLAINT  Chief Complaint   Patient presents with    Ear Pain    Cough       EXTERNAL RECORDS REVIEWED  7/26/2023, [facility] emergency department, foot pain    HPI/ROS    Veronica Kirby is a 34 y.o. female who presents for evaluation of congestion, rhinorrhea, right ear pain, coughing and general malaise.  This is been going on for the past 2 days.  History of anxiety and depression as well as hypertension and asthma.  No chest pain.  No shortness of breath.  No abdominal pain.  No other complaints    PAST MEDICAL HISTORY   has a past medical history of Depression, Insomnia, and Psychiatric disorder.    SURGICAL HISTORY  patient denies any surgical history    FAMILY HISTORY  No family history on file.    SOCIAL HISTORY  Social History     Tobacco Use    Smoking status: Every Day     Current packs/day: 0.50     Types: Cigarettes    Smokeless tobacco: Never   Vaping Use    Vaping Use: Never used   Substance and Sexual Activity    Alcohol use: Yes     Alcohol/week: 0.0 oz     Comment: social 1-3 wk    Drug use: No    Sexual activity: Yes       CURRENT MEDICATIONS  Home Medications       Reviewed by Kane Anderson R.N. (Registered Nurse) on 03/03/24 at 1532  Med List Status: Not Addressed     Medication Last Dose Status   acyclovir (ZOVIRAX) 400 MG tablet  Active   ascorbic acid (ASCORBIC ACID) 500 MG Tab  Active   IRON PO  Active   MELATONIN PO  Active   naproxen (NAPROSYN) 500 MG Tab  Active                    ALLERGIES  Allergies   Allergen Reactions    Pcn [Penicillins] Anaphylaxis    Sulfa Drugs Anaphylaxis    Codeine Unspecified     Pt states \"I was a childhood allergie\".       PHYSICAL EXAM  VITAL SIGNS: /72   Pulse 99   Temp 37.7 °C (99.8 °F) (Temporal)   Resp 18   Ht 1.448 m (4' 9\")   Wt 68.4 kg (150 lb 12.7 oz)   SpO2 97%   BMI 32.63 kg/m²    Constitutional: She is in distress with right ear pain  HENT: No signs of significant acute trauma.  Nasal congestion is " evident.  Significant cerumen burden of the right external auditory canal, the visualized portion of the tympanic membrane is unremarkable however, no erythema.  Unremarkable left external auditory canal and tympanic membrane.  Eyes: Conjunctiva normal, non-icteric.   Chest: Normal nonlabored respirations  Skin: No appreciable rash on the exposed skin  Musculoskeletal: No obvious acute trauma appreciated  Neurologic: Alert, no obvious focal deficits noted.        DIAGNOSTIC STUDIES / PROCEDURES  LABS  Results for orders placed or performed during the hospital encounter of 03/03/24   POC CoV-2, FLU A/B, RSV by PCR   Result Value Ref Range    POC Influenza A RNA, PCR Negative Negative    POC Influenza B RNA, PCR Negative Negative    POC RSV, by PCR Negative Negative    POC SARS-CoV-2, PCR DETECTED (AA)           COURSE & MEDICAL DECISION MAKING    ED Observation Status? No; Patient does not meet criteria for ED Observation.     INITIAL ASSESSMENT, COURSE AND PLAN  Care Narrative: This is a 34-year-old female coming in with signs and symptoms consistent with a viral URI, viral swab was obtained at triage and is positive for COVID.  No evidence of otitis media on exam despite the ear pain.  Here in the emergency department she is receiving a dose of Norco.  I will prescribe her naproxen, pseudoephedrine, guaifenesin and oxymetazoline spray for symptom relief.  She is discharged home in stable condition with strict return instructions provided  Prescription for naproxen, pseudoephedrine, guaifenesin and oxymetazoline      FINAL DIAGNOSIS  1. Viral URI    2. Acute otalgia, right    3. RSV infection           Electronically signed by: Getachew Paredes M.D., 3/3/2024 4:35 PM

## 2024-07-02 ENCOUNTER — APPOINTMENT (OUTPATIENT)
Dept: RADIOLOGY | Facility: MEDICAL CENTER | Age: 34
End: 2024-07-02
Attending: EMERGENCY MEDICINE
Payer: COMMERCIAL

## 2024-07-02 ENCOUNTER — HOSPITAL ENCOUNTER (EMERGENCY)
Facility: MEDICAL CENTER | Age: 34
End: 2024-07-02
Attending: EMERGENCY MEDICINE
Payer: COMMERCIAL

## 2024-07-02 ENCOUNTER — APPOINTMENT (OUTPATIENT)
Dept: URGENT CARE | Facility: CLINIC | Age: 34
End: 2024-07-02

## 2024-07-02 VITALS
TEMPERATURE: 98.2 F | OXYGEN SATURATION: 100 % | WEIGHT: 137.13 LBS | HEART RATE: 98 BPM | SYSTOLIC BLOOD PRESSURE: 121 MMHG | BODY MASS INDEX: 29.58 KG/M2 | HEIGHT: 57 IN | RESPIRATION RATE: 14 BRPM | DIASTOLIC BLOOD PRESSURE: 74 MMHG
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
## 2024-07-02 DIAGNOSIS — M79.10 MYALGIA: ICD-10-CM

## 2024-07-02 DIAGNOSIS — R07.89 CHEST WALL PAIN: ICD-10-CM
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
## 2024-07-02 DIAGNOSIS — M25.512 ACUTE PAIN OF LEFT SHOULDER: ICD-10-CM

## 2024-07-02 PROCEDURE — 99284 EMERGENCY DEPT VISIT MOD MDM: CPT

## 2024-07-02 PROCEDURE — 71046 X-RAY EXAM CHEST 2 VIEWS: CPT

## 2024-07-02 PROCEDURE — 73030 X-RAY EXAM OF SHOULDER: CPT | Mod: RT

## 2024-07-02 PROCEDURE — 700102 HCHG RX REV CODE 250 W/ 637 OVERRIDE(OP): Mod: UD | Performed by: EMERGENCY MEDICINE

## 2024-07-02 PROCEDURE — A9270 NON-COVERED ITEM OR SERVICE: HCPCS | Mod: UD | Performed by: EMERGENCY MEDICINE

## 2024-07-02 RX ORDER — METHOCARBAMOL 500 MG/1
1000 TABLET, FILM COATED ORAL ONCE
Status: COMPLETED | OUTPATIENT
Start: 2024-07-02 | End: 2024-07-02

## 2024-07-02 RX ORDER — NAPROXEN 500 MG/1
500 TABLET ORAL 2 TIMES DAILY WITH MEALS
Qty: 20 TABLET | Refills: 0 | Status: SHIPPED | OUTPATIENT
Start: 2024-07-02 | End: 2024-07-12

## 2024-07-02 RX ORDER — ACETAMINOPHEN 500 MG
1000 TABLET ORAL ONCE
Status: COMPLETED | OUTPATIENT
Start: 2024-07-02 | End: 2024-07-02

## 2024-07-02 RX ORDER — ACETAMINOPHEN 325 MG/1
650 TABLET ORAL EVERY 6 HOURS PRN
Qty: 30 TABLET | Refills: 0 | Status: SHIPPED | OUTPATIENT
Start: 2024-07-02

## 2024-07-02 RX ORDER — METHOCARBAMOL 750 MG/1
750 TABLET, FILM COATED ORAL 3 TIMES DAILY
Qty: 30 TABLET | Refills: 0 | Status: SHIPPED | OUTPATIENT
Start: 2024-07-02 | End: 2024-07-12

## 2024-07-02 RX ADMIN — METHOCARBAMOL 1000 MG: 500 TABLET ORAL at 19:31

## 2024-07-02 RX ADMIN — ACETAMINOPHEN 1000 MG: 500 TABLET, FILM COATED ORAL at 19:31

## 2024-09-13 ENCOUNTER — HOSPITAL ENCOUNTER (EMERGENCY)
Facility: MEDICAL CENTER | Age: 34
End: 2024-09-14
Attending: STUDENT IN AN ORGANIZED HEALTH CARE EDUCATION/TRAINING PROGRAM
Payer: MEDICAID

## 2024-09-13 DIAGNOSIS — M71.50 TRAUMATIC BURSITIS: ICD-10-CM

## 2024-09-13 DIAGNOSIS — M25.562 ACUTE PAIN OF LEFT KNEE: ICD-10-CM

## 2024-09-13 PROCEDURE — 99284 EMERGENCY DEPT VISIT MOD MDM: CPT

## 2024-09-14 ENCOUNTER — APPOINTMENT (OUTPATIENT)
Dept: RADIOLOGY | Facility: MEDICAL CENTER | Age: 34
End: 2024-09-14
Attending: STUDENT IN AN ORGANIZED HEALTH CARE EDUCATION/TRAINING PROGRAM
Payer: MEDICAID

## 2024-09-14 VITALS
BODY MASS INDEX: 29.68 KG/M2 | WEIGHT: 137.57 LBS | SYSTOLIC BLOOD PRESSURE: 125 MMHG | RESPIRATION RATE: 18 BRPM | OXYGEN SATURATION: 97 % | DIASTOLIC BLOOD PRESSURE: 60 MMHG | HEIGHT: 57 IN | HEART RATE: 117 BPM | TEMPERATURE: 97.5 F

## 2024-09-14 PROCEDURE — 99284 EMERGENCY DEPT VISIT MOD MDM: CPT

## 2024-09-14 PROCEDURE — 73564 X-RAY EXAM KNEE 4 OR MORE: CPT | Mod: LT

## 2024-09-14 PROCEDURE — A9270 NON-COVERED ITEM OR SERVICE: HCPCS | Mod: UD | Performed by: STUDENT IN AN ORGANIZED HEALTH CARE EDUCATION/TRAINING PROGRAM

## 2024-09-14 PROCEDURE — 700102 HCHG RX REV CODE 250 W/ 637 OVERRIDE(OP): Mod: UD | Performed by: STUDENT IN AN ORGANIZED HEALTH CARE EDUCATION/TRAINING PROGRAM

## 2024-09-14 RX ORDER — OXYCODONE HYDROCHLORIDE 5 MG/1
5 TABLET ORAL ONCE
Status: COMPLETED | OUTPATIENT
Start: 2024-09-14 | End: 2024-09-14

## 2024-09-14 RX ADMIN — OXYCODONE HYDROCHLORIDE 5 MG: 5 TABLET ORAL at 00:37

## 2024-09-14 NOTE — DISCHARGE INSTRUCTIONS
Please wear your knee immobilizer for the next 2 to 3 days to allow the tendons to heal and the bursa of your knee to calm down.  Take Tylenol and ibuprofen every 6 hours, apply ice and elevate your knee.  Follow-up with your primary care doctor in 1 week for recheck.
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
## 2024-09-14 NOTE — ED NOTES
Knee immobilizer placed and crutches provided.  Pt states she has used crutches before and did not require training.    Discharge instructions and follow up care provided, pt verbalized understanding of instructions and management at home.  Pt boyfriend at bedside.  Pt used crutches and had good stable mobility upon discharge.
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
## 2025-01-03 ENCOUNTER — PHARMACY VISIT (OUTPATIENT)
Dept: PHARMACY | Facility: MEDICAL CENTER | Age: 35
End: 2025-01-03
Payer: COMMERCIAL

## 2025-01-03 ENCOUNTER — HOSPITAL ENCOUNTER (EMERGENCY)
Facility: MEDICAL CENTER | Age: 35
End: 2025-01-03
Attending: EMERGENCY MEDICINE

## 2025-01-03 VITALS
OXYGEN SATURATION: 99 % | TEMPERATURE: 96.5 F | BODY MASS INDEX: 27.15 KG/M2 | DIASTOLIC BLOOD PRESSURE: 74 MMHG | RESPIRATION RATE: 16 BRPM | SYSTOLIC BLOOD PRESSURE: 126 MMHG | HEART RATE: 105 BPM | WEIGHT: 125.44 LBS

## 2025-01-03 DIAGNOSIS — H60.391 OTHER INFECTIVE ACUTE OTITIS EXTERNA OF RIGHT EAR: ICD-10-CM

## 2025-01-03 PROCEDURE — 99282 EMERGENCY DEPT VISIT SF MDM: CPT

## 2025-01-03 PROCEDURE — RXMED WILLOW AMBULATORY MEDICATION CHARGE: Performed by: EMERGENCY MEDICINE

## 2025-01-03 RX ORDER — NEOMYCIN SULFATE, POLYMYXIN B SULFATE AND HYDROCORTISONE 10; 3.5; 1 MG/ML; MG/ML; [USP'U]/ML
4 SUSPENSION/ DROPS AURICULAR (OTIC) 3 TIMES DAILY
Qty: 10 ML | Refills: 0 | Status: ACTIVE | OUTPATIENT
Start: 2025-01-03

## 2025-01-03 ASSESSMENT — FIBROSIS 4 INDEX: FIB4 SCORE: 0.39

## 2025-01-03 NOTE — ED PROVIDER NOTES
ED Provider Note    Primary care provider: Molina Scales D.O.    CHIEF COMPLAINT  Chief Complaint   Patient presents with    Ear Pain     R ear pain for 2 days.  Reports clear drainage from same       HPI  Veronica Kirby is a 34 y.o. female who presents to the Emergency Department several days of right ear pain.  The patient has had some mild congestion, denies any fevers or chills, does note significantly decreased hearing in the right ear.    External record review: Patient with some frequency to ER visits, was hospitalized to [facility] in September for sepsis and respiratory failure, required intubation.  This was secondary to left knee septic arthritis and prepatellar bursitis.       PAST MEDICAL HISTORY   has a past medical history of Depression, Insomnia, and Psychiatric disorder.    SURGICAL HISTORY  patient denies any surgical history    PHYSICAL EXAM  VITAL SIGNS: /74   Pulse (!) 105   Temp 35.8 °C (96.5 °F) (Temporal)   Resp 16   Wt 56.9 kg (125 lb 7.1 oz)   SpO2 99%   BMI 27.15 kg/m²   Constitutional: Awake, alert in no apparent distress.  HENT: Normocephalic, Bilateral external ears normal. Nose normal.  No mastoid tenderness, there is some right tragal tenderness, the right ear canal has whitish discharge, canal is a bit macerated and very tender on examination, partially visualized tympanic membrane normal, left tympanic membrane is normal.  Eyes: Conjunctiva normal, non-icteric, EOMI.    Thorax & Lungs: Easy unlabored respirations  Cardiovascular:    Abdomen:  No distention  Skin: Visualized skin is  Dry, No erythema, No rash.   Extremities:   atraumatic   Neurologic: Alert, Grossly non-focal.   Psychiatric: Affect and Mood normal    11:00 AM - Nursing notes reviewed, patient seen and examined at bedside.         Escalation of care considered, and ultimately not performed: Laboratory analysis and diagnostic imaging.    Decision tools and prescription drugs considered including, but not  limited to: Antibiotics, Pred Forte.    Decision Making:  This is a pleasant 34 y.o. year old female who presents with right ear pain, the ear canal itself is a little bit edematous, there is discharge, there is tenderness, the patient will be treated with Pred forte for otitis media.  I explained that she needs to leave the ear up, instill the drops, keep the ear up for a few minutes to let it absorb into the tissue.  Recommend at least 3 times daily for the next 5 days.       The patient was discharged (see d/c instructions) was told to return immediately for any signs or symptoms listed, or any worsening at all.  The patient verbally agreed to the discharge precautions and follow-up plan which is documented in EPIC.    Discharge Medications:  New Prescriptions    NEOMYCIN-POLYMYXIN-HC (PEDIOTIC HC) 3.5-42784-1 SUSPENSION    Administer 4 Drops into affected ear(s) 3 times a day.       FINAL IMPRESSION  1. Other infective acute otitis externa of right ear

## 2025-01-03 NOTE — ED TRIAGE NOTES
Chief Complaint   Patient presents with    Ear Pain     R ear pain for 2 days.  Reports clear drainage from same

## 2025-01-19 ENCOUNTER — APPOINTMENT (OUTPATIENT)
Dept: RADIOLOGY | Facility: MEDICAL CENTER | Age: 35
End: 2025-01-19
Attending: EMERGENCY MEDICINE

## 2025-01-19 ENCOUNTER — HOSPITAL ENCOUNTER (EMERGENCY)
Facility: MEDICAL CENTER | Age: 35
End: 2025-01-19
Attending: EMERGENCY MEDICINE

## 2025-01-19 VITALS
DIASTOLIC BLOOD PRESSURE: 70 MMHG | BODY MASS INDEX: 26.97 KG/M2 | SYSTOLIC BLOOD PRESSURE: 133 MMHG | RESPIRATION RATE: 18 BRPM | WEIGHT: 125 LBS | OXYGEN SATURATION: 98 % | TEMPERATURE: 97 F | HEIGHT: 57 IN | HEART RATE: 99 BPM
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
## 2025-01-19 DIAGNOSIS — S09.90XA CLOSED HEAD INJURY, INITIAL ENCOUNTER: ICD-10-CM

## 2025-01-19 DIAGNOSIS — S16.1XXA STRAIN OF NECK MUSCLE, INITIAL ENCOUNTER: ICD-10-CM

## 2025-01-19 DIAGNOSIS — S20.219A CONTUSION OF RIB, UNSPECIFIED LATERALITY, INITIAL ENCOUNTER: ICD-10-CM

## 2025-01-19 LAB
ABO GROUP BLD: NORMAL
ALBUMIN SERPL BCP-MCNC: 4.4 G/DL (ref 3.2–4.9)
ALBUMIN/GLOB SERPL: 1.8 G/DL
ALP SERPL-CCNC: 93 U/L (ref 30–99)
ALT SERPL-CCNC: 21 U/L (ref 2–50)
ANION GAP SERPL CALC-SCNC: 11 MMOL/L (ref 7–16)
APTT PPP: 28.2 SEC (ref 24.7–36)
AST SERPL-CCNC: 15 U/L (ref 12–45)
BILIRUB SERPL-MCNC: 0.5 MG/DL (ref 0.1–1.5)
BLD GP AB SCN SERPL QL: NORMAL
BUN SERPL-MCNC: 11 MG/DL (ref 8–22)
CALCIUM ALBUM COR SERPL-MCNC: 8.8 MG/DL (ref 8.5–10.5)
CALCIUM SERPL-MCNC: 9.1 MG/DL (ref 8.5–10.5)
CHLORIDE SERPL-SCNC: 107 MMOL/L (ref 96–112)
CO2 SERPL-SCNC: 21 MMOL/L (ref 20–33)
CREAT SERPL-MCNC: 0.9 MG/DL (ref 0.5–1.4)
ERYTHROCYTE [DISTWIDTH] IN BLOOD BY AUTOMATED COUNT: 38.2 FL (ref 35.9–50)
ETHANOL BLD-MCNC: <10.1 MG/DL
GFR SERPLBLD CREATININE-BSD FMLA CKD-EPI: 85 ML/MIN/1.73 M 2
GLOBULIN SER CALC-MCNC: 2.5 G/DL (ref 1.9–3.5)
GLUCOSE SERPL-MCNC: 114 MG/DL (ref 65–99)
HCG SERPL QL: NEGATIVE
HCT VFR BLD AUTO: 42.8 % (ref 37–47)
HGB BLD-MCNC: 14.5 G/DL (ref 12–16)
INR PPP: 1.04 (ref 0.87–1.13)
MCH RBC QN AUTO: 28.1 PG (ref 27–33)
MCHC RBC AUTO-ENTMCNC: 33.9 G/DL (ref 32.2–35.5)
MCV RBC AUTO: 82.9 FL (ref 81.4–97.8)
PLATELET # BLD AUTO: 261 K/UL (ref 164–446)
PMV BLD AUTO: 8.8 FL (ref 9–12.9)
POTASSIUM SERPL-SCNC: 3.6 MMOL/L (ref 3.6–5.5)
PROT SERPL-MCNC: 6.9 G/DL (ref 6–8.2)
PROTHROMBIN TIME: 13.6 SEC (ref 12–14.6)
RBC # BLD AUTO: 5.16 M/UL (ref 4.2–5.4)
RH BLD: NORMAL
SODIUM SERPL-SCNC: 139 MMOL/L (ref 135–145)
WBC # BLD AUTO: 6.6 K/UL (ref 4.8–10.8)

## 2025-01-19 PROCEDURE — 70486 CT MAXILLOFACIAL W/O DYE: CPT

## 2025-01-19 PROCEDURE — 85730 THROMBOPLASTIN TIME PARTIAL: CPT

## 2025-01-19 PROCEDURE — 80053 COMPREHEN METABOLIC PANEL: CPT

## 2025-01-19 PROCEDURE — 72170 X-RAY EXAM OF PELVIS: CPT

## 2025-01-19 PROCEDURE — 307740 HCHG GREEN TRAUMA TEAM SERVICES

## 2025-01-19 PROCEDURE — 96374 THER/PROPH/DIAG INJ IV PUSH: CPT

## 2025-01-19 PROCEDURE — 36415 COLL VENOUS BLD VENIPUNCTURE: CPT

## 2025-01-19 PROCEDURE — 700117 HCHG RX CONTRAST REV CODE 255: Performed by: EMERGENCY MEDICINE

## 2025-01-19 PROCEDURE — 72128 CT CHEST SPINE W/O DYE: CPT

## 2025-01-19 PROCEDURE — 85027 COMPLETE CBC AUTOMATED: CPT

## 2025-01-19 PROCEDURE — 70450 CT HEAD/BRAIN W/O DYE: CPT

## 2025-01-19 PROCEDURE — 84703 CHORIONIC GONADOTROPIN ASSAY: CPT

## 2025-01-19 PROCEDURE — 71045 X-RAY EXAM CHEST 1 VIEW: CPT

## 2025-01-19 PROCEDURE — 72131 CT LUMBAR SPINE W/O DYE: CPT

## 2025-01-19 PROCEDURE — 86900 BLOOD TYPING SEROLOGIC ABO: CPT

## 2025-01-19 PROCEDURE — 73560 X-RAY EXAM OF KNEE 1 OR 2: CPT | Mod: RT

## 2025-01-19 PROCEDURE — 71260 CT THORAX DX C+: CPT

## 2025-01-19 PROCEDURE — 85610 PROTHROMBIN TIME: CPT

## 2025-01-19 PROCEDURE — 82077 ASSAY SPEC XCP UR&BREATH IA: CPT

## 2025-01-19 PROCEDURE — A9270 NON-COVERED ITEM OR SERVICE: HCPCS | Performed by: EMERGENCY MEDICINE

## 2025-01-19 PROCEDURE — 700111 HCHG RX REV CODE 636 W/ 250 OVERRIDE (IP): Performed by: EMERGENCY MEDICINE

## 2025-01-19 PROCEDURE — 70498 CT ANGIOGRAPHY NECK: CPT

## 2025-01-19 PROCEDURE — 72125 CT NECK SPINE W/O DYE: CPT

## 2025-01-19 PROCEDURE — 99285 EMERGENCY DEPT VISIT HI MDM: CPT

## 2025-01-19 PROCEDURE — 86850 RBC ANTIBODY SCREEN: CPT

## 2025-01-19 PROCEDURE — 700102 HCHG RX REV CODE 250 W/ 637 OVERRIDE(OP): Performed by: EMERGENCY MEDICINE

## 2025-01-19 PROCEDURE — 86901 BLOOD TYPING SEROLOGIC RH(D): CPT

## 2025-01-19 RX ORDER — ACETAMINOPHEN 500 MG
1000 TABLET ORAL ONCE
Status: COMPLETED | OUTPATIENT
Start: 2025-01-19 | End: 2025-01-19

## 2025-01-19 RX ORDER — LORAZEPAM 2 MG/ML
INJECTION INTRAMUSCULAR
Status: COMPLETED | OUTPATIENT
Start: 2025-01-19 | End: 2025-01-19

## 2025-01-19 RX ADMIN — LORAZEPAM 0.5 MG: 2 INJECTION, SOLUTION INTRAMUSCULAR; INTRAVENOUS at 09:00

## 2025-01-19 RX ADMIN — ACETAMINOPHEN 1000 MG: 500 TABLET ORAL at 09:25

## 2025-01-19 RX ADMIN — IOHEXOL 80 ML: 350 INJECTION, SOLUTION INTRAVENOUS at 09:45

## 2025-01-19 ASSESSMENT — PAIN DESCRIPTION - PAIN TYPE: TYPE: ACUTE PAIN

## 2025-01-19 ASSESSMENT — FIBROSIS 4 INDEX: FIB4 SCORE: 0.4

## 2025-01-19 NOTE — ED NOTES
Provided pt with food to eat  Pt said that her friend helping her with place, provided her belongings so she could make phone calls.   Provided with d/c instructions, advised take her time to eat and make phone calls.

## 2025-01-19 NOTE — ED NOTES
Pt said that her friend sending an ub[employer] here will be here in 45minutes. Provided with  for her phone .
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
## 2025-01-23 LAB — COMPONENT CELLULAR 8504CLL: NORMAL

## 2025-03-11 ENCOUNTER — HOSPITAL ENCOUNTER (EMERGENCY)
Facility: MEDICAL CENTER | Age: 35
End: 2025-03-11
Attending: STUDENT IN AN ORGANIZED HEALTH CARE EDUCATION/TRAINING PROGRAM

## 2025-03-11 VITALS
TEMPERATURE: 97 F | RESPIRATION RATE: 17 BRPM | HEIGHT: 57 IN | HEART RATE: 100 BPM | WEIGHT: 126.98 LBS | DIASTOLIC BLOOD PRESSURE: 88 MMHG | BODY MASS INDEX: 27.4 KG/M2 | OXYGEN SATURATION: 98 % | SYSTOLIC BLOOD PRESSURE: 132 MMHG

## 2025-03-11 DIAGNOSIS — L08.9 INFECTED BLISTER: ICD-10-CM

## 2025-03-11 DIAGNOSIS — T14.8XXA INFECTED BLISTER: ICD-10-CM

## 2025-03-11 PROCEDURE — 303977 HCHG I & D

## 2025-03-11 PROCEDURE — 99282 EMERGENCY DEPT VISIT SF MDM: CPT

## 2025-03-11 ASSESSMENT — PAIN DESCRIPTION - PAIN TYPE: TYPE: ACUTE PAIN

## 2025-03-11 ASSESSMENT — FIBROSIS 4 INDEX: FIB4 SCORE: 0.45

## 2025-03-12 NOTE — ED NOTES
Patient ambulatory with a steady gait from the lobby to the room. Patient has a dog at bedside. Patient connected to BP and pulse OX. Chart up for ERP.

## 2025-03-12 NOTE — ED TRIAGE NOTES
Veronica Kirby  35 y.o.  female  Chief Complaint   Patient presents with    Spider Bite     C/o spider bite on her left ankle, redness noted.     Also c/o upset stomach. No vomiting.

## 2025-03-12 NOTE — ED PROVIDER NOTES
ED Provider Note    CHIEF COMPLAINT  Chief Complaint   Patient presents with    Spider Bite     C/o spider bite on her left ankle, redness noted.       EXTERNAL RECORDS REVIEWED  Inpatient admission discharge summary October 2024 patient was admitted to [facility] for sepsis secondary to left knee septic arthritis and prepatellar bursitis    HPI/ROS  LIMITATION TO HISTORY   Select: : None  OUTSIDE HISTORIAN(S):  \None    Veronica Kirby is a 35 y.o. female presenting for an infected blister to the back of her left ankle.  Developed a blister over the last several days.  She was concerned she might have a snake bite or bite.  Denies fevers chills body aches.  Having some mild irritation to the skin around the blister.  Patient is homeless.  She denies history of diabetes or drug use.    PAST MEDICAL HISTORY   has a past medical history of Depression, Insomnia, and Psychiatric disorder.    SURGICAL HISTORY  patient denies any surgical history    FAMILY HISTORY  History reviewed. No pertinent family history.    SOCIAL HISTORY  Social History     Tobacco Use    Smoking status: Every Day     Current packs/day: 0.50     Types: Cigarettes    Smokeless tobacco: Never   Vaping Use    Vaping status: Every Day    Substances: Nicotine   Substance and Sexual Activity    Alcohol use: Yes     Comment: monthly    Drug use: Yes     Comment: Meth    Sexual activity: Yes       CURRENT MEDICATIONS  Home Medications       Reviewed by Jordan Marcos R.N. (Registered Nurse) on 03/11/25 at 2045  Med List Status: Partial     Medication Last Dose Status   acetaminophen (TYLENOL) 325 MG Tab  Active   acyclovir (ZOVIRAX) 400 MG tablet  Active   ascorbic acid (ASCORBIC ACID) 500 MG Tab  Active   IRON PO  Active   MELATONIN PO  Active   neomycin-polymyxin-HC (PEDIOTIC HC) 3.5-73939-1 Suspension  Active   pseudoephedrine SR (SUDAFED SR) 120 MG TABLET SR 12 HR  Active                    ALLERGIES  Allergies   Allergen Reactions    Pcn [Penicillins]  "Anaphylaxis    Sulfa Drugs Anaphylaxis    Codeine Unspecified     Pt states \"I was a childhood allergie\".       PHYSICAL EXAM  VITAL SIGNS: /88   Pulse (!) 109   Temp 36.2 °C (97.2 °F) (Temporal)   Resp 18   Ht 1.448 m (4' 9\")   Wt 57.6 kg (126 lb 15.8 oz)   SpO2 97%   BMI 27.48 kg/m²    Left foot: There is a blister to the heel of the left foot with some purulent fluid within the blister cavity.  There is a small amount of surrounding erythema but no induration or fluctuance.  Rest of the foot is unremarkable, there is no surrounding signs of cellulitis.  Foot is warm and well-perfused.    DIAGNOSTIC STUDIES / PROCEDURES    LABS and ECG  Results for orders placed or performed during the hospital encounter of 01/19/25   Prothrombin Time    Collection Time: 01/19/25  8:49 AM   Result Value Ref Range    PT 13.6 12.0 - 14.6 sec    INR 1.04 0.87 - 1.13   APTT    Collection Time: 01/19/25  8:49 AM   Result Value Ref Range    APTT 28.2 24.7 - 36.0 sec   Comp Metabolic Panel    Collection Time: 01/19/25  8:49 AM   Result Value Ref Range    Sodium 139 135 - 145 mmol/L    Potassium 3.6 3.6 - 5.5 mmol/L    Chloride 107 96 - 112 mmol/L    Co2 21 20 - 33 mmol/L    Anion Gap 11.0 7.0 - 16.0    Glucose 114 (H) 65 - 99 mg/dL    Bun 11 8 - 22 mg/dL    Creatinine 0.90 0.50 - 1.40 mg/dL    Calcium 9.1 8.5 - 10.5 mg/dL    Correct Calcium 8.8 8.5 - 10.5 mg/dL    AST(SGOT) 15 12 - 45 U/L    ALT(SGPT) 21 2 - 50 U/L    Alkaline Phosphatase 93 30 - 99 U/L    Total Bilirubin 0.5 0.1 - 1.5 mg/dL    Albumin 4.4 3.2 - 4.9 g/dL    Total Protein 6.9 6.0 - 8.2 g/dL    Globulin 2.5 1.9 - 3.5 g/dL    A-G Ratio 1.8 g/dL   DIAGNOSTIC ALCOHOL    Collection Time: 01/19/25  8:49 AM   Result Value Ref Range    Diagnostic Alcohol <10.1 <10.1 mg/dL   HCG QUAL SERUM    Collection Time: 01/19/25  8:49 AM   Result Value Ref Range    Beta-Hcg Qualitative Serum Negative Negative   CBC WITHOUT DIFFERENTIAL    Collection Time: 01/19/25  8:49 AM "   Result Value Ref Range    WBC 6.6 4.8 - 10.8 K/uL    RBC 5.16 4.20 - 5.40 M/uL    Hemoglobin 14.5 12.0 - 16.0 g/dL    Hematocrit 42.8 37.0 - 47.0 %    MCV 82.9 81.4 - 97.8 fL    MCH 28.1 27.0 - 33.0 pg    MCHC 33.9 32.2 - 35.5 g/dL    RDW 38.2 35.9 - 50.0 fL    Platelet Count 261 164 - 446 K/uL    MPV 8.8 (L) 9.0 - 12.9 fL   COD - Adult (Type and Screen)    Collection Time: 01/19/25  8:49 AM   Result Value Ref Range    ABO Grouping Only A     Rh Grouping Only POS     Antibody Screen-Cod NEG    COMPONENT CELLULAR    Collection Time: 01/19/25  8:49 AM   Result Value Ref Range    Component Cellular N/A    ESTIMATED GFR    Collection Time: 01/19/25  8:49 AM   Result Value Ref Range    GFR (CKD-EPI) 85 >60 mL/min/1.73 m 2       RADIOLOGY  I have independently interpreted the diagnostic imaging associated with this visit and am waiting the final reading from the radiologist.   My preliminary interpretation is as follows:   -   Radiologist interpretation:   No orders to display           MEDICAL DECISION MAKING      ED COURSE AND PLAN    35-year-old female presenting to the emergency department for a blister to the heel of the left foot which has become infected.  There is some purulent fluid within the blister.  There is no evidence of foot cellulitis.  Patient was initially tachycardic on arrival to the emergency department she was not tachycardic when I evaluated her in the room.  She is afebrile.  No indication for labs or imaging.  I performed incision and drainage, and removed the epidermis overlying the blister and applied a sterile dressing.  There is no indication for antibiotics.  Patient is appropriate for discharge.               Procedures:    Incision and Drainage Procedure Note    Indication: Infected blister    Procedure: I sterilized the overlying skin and blister with chlorhexidine.  I used a 15 blade scalpel to unroofed the epidermis overlying the blister.  There was purulent discharge within the  blister cavity.  I removed the epidermis and applied a sterile dressing    The patient tolerated the procedure well.    Complications: None    ----------------------------------------------------------------------------------  DISCUSSIONS    I have discussed management of the patient with the following physicians and PRAVEENA's:      Discussion of management with other Q or appropriate source(s):     Escalation of care considered, and ultimately not performed: Considered no indication for labs, x-ray,    Barriers to care at this time, including but not limited to: Homeless, limited financial capacity    Decision tools and prescription drugs considered including, but not limited to: Consider no indication for antibiotic    FINAL IMPRESSION    1. Infected blister        New Prescriptions    No medications on file       No follow-up provider specified.      DISPOSITION    Discharge home, Stable    Admission: The patient will be admitted for further evaluation and treatment. Discussed case with consultants and relayed all necessary information.      This chart was dictated using an electronic voice recognition software. The chart has been reviewed and edited but there is still possibility for dictation errors due to limitation of software.    Corky Valderrama DO 3/11/2025

## 2025-03-12 NOTE — DISCHARGE INSTRUCTIONS
You were seen in the emergency department for an abscess, an infection of the skin.  The abscess was opened with a scalpel and allowed to drain.  Apply warm compresses to the area 5 times a day to help keep the skin lesion open and to help the abscess heal from the inside out.    Occasionally the opening to the skin can seal over and the abscess can reaccumulate necessitating us to perform another incision and drainage of the abscess.  Monitor for worsening signs of infection including:  - Worsening Redness  - Increased pain  - Fevers/Chills/body aches    Come back to the emergency department if you develop any of these symptoms

## 2025-05-07 ENCOUNTER — APPOINTMENT (OUTPATIENT)
Dept: RADIOLOGY | Facility: MEDICAL CENTER | Age: 35
End: 2025-05-07
Attending: EMERGENCY MEDICINE
Payer: COMMERCIAL

## 2025-05-07 ENCOUNTER — HOSPITAL ENCOUNTER (EMERGENCY)
Facility: MEDICAL CENTER | Age: 35
End: 2025-05-07
Attending: EMERGENCY MEDICINE
Payer: COMMERCIAL

## 2025-05-07 VITALS
RESPIRATION RATE: 17 BRPM | SYSTOLIC BLOOD PRESSURE: 122 MMHG | WEIGHT: 125 LBS | TEMPERATURE: 98.3 F | HEIGHT: 57 IN | OXYGEN SATURATION: 98 % | BODY MASS INDEX: 26.97 KG/M2 | DIASTOLIC BLOOD PRESSURE: 79 MMHG | HEART RATE: 114 BPM

## 2025-05-07 DIAGNOSIS — S63.501A SPRAIN OF RIGHT WRIST, INITIAL ENCOUNTER: ICD-10-CM
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
## 2025-05-07 PROCEDURE — 700102 HCHG RX REV CODE 250 W/ 637 OVERRIDE(OP): Performed by: EMERGENCY MEDICINE

## 2025-05-07 PROCEDURE — 99284 EMERGENCY DEPT VISIT MOD MDM: CPT

## 2025-05-07 PROCEDURE — 73090 X-RAY EXAM OF FOREARM: CPT | Mod: RT

## 2025-05-07 PROCEDURE — 73110 X-RAY EXAM OF WRIST: CPT | Mod: RT

## 2025-05-07 PROCEDURE — A9270 NON-COVERED ITEM OR SERVICE: HCPCS | Performed by: EMERGENCY MEDICINE

## 2025-05-07 RX ORDER — IBUPROFEN 600 MG/1
600 TABLET, FILM COATED ORAL ONCE
Status: COMPLETED | OUTPATIENT
Start: 2025-05-07 | End: 2025-05-07

## 2025-05-07 RX ADMIN — IBUPROFEN 600 MG: 600 TABLET ORAL at 16:14

## 2025-05-07 ASSESSMENT — FIBROSIS 4 INDEX: FIB4 SCORE: 0.44
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
## 2025-05-07 NOTE — DISCHARGE INSTRUCTIONS
Take 600 mg of ibuprofen every 6 hours as discussed.  Remove splint and apply ice 20 minutes on 20 minutes off at least 5 times a day

## 2025-05-08 NOTE — DISCHARGE PLANNING
Papers faxed to Animal dispatch and they will page an animal  to come  the pet.     [employer] Officer at bedside, report taken. Case #    Cab Voucher [id number] given to RN for Pt discharge for safe ride to [facility].

## 2025-05-08 NOTE — DISCHARGE PLANNING
ERICKA called [facility] and spoke to Kin. ERICKA notified him Pt is still here waiting for Animal Control. Per Kin Pt has a bed secured and she can come at any time. ERICKA updated PM ERICKA.
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
